# Patient Record
Sex: MALE | Race: WHITE | NOT HISPANIC OR LATINO | Employment: OTHER | RURAL
[De-identification: names, ages, dates, MRNs, and addresses within clinical notes are randomized per-mention and may not be internally consistent; named-entity substitution may affect disease eponyms.]

---

## 2017-05-12 ENCOUNTER — CONVERSION ENCOUNTER (OUTPATIENT)
Dept: FAMILY MEDICINE CLINIC | Facility: CLINIC | Age: 62
End: 2017-05-12

## 2018-04-19 ENCOUNTER — CONVERSION ENCOUNTER (OUTPATIENT)
Dept: FAMILY MEDICINE CLINIC | Facility: CLINIC | Age: 63
End: 2018-04-19

## 2018-05-29 ENCOUNTER — CONVERSION ENCOUNTER (OUTPATIENT)
Dept: FAMILY MEDICINE CLINIC | Facility: CLINIC | Age: 63
End: 2018-05-29

## 2019-06-04 VITALS
HEIGHT: 69 IN | HEART RATE: 71 BPM | DIASTOLIC BLOOD PRESSURE: 80 MMHG | HEART RATE: 62 BPM | WEIGHT: 209.5 LBS | HEART RATE: 66 BPM | DIASTOLIC BLOOD PRESSURE: 86 MMHG | SYSTOLIC BLOOD PRESSURE: 120 MMHG | SYSTOLIC BLOOD PRESSURE: 128 MMHG | RESPIRATION RATE: 16 BRPM | RESPIRATION RATE: 18 BRPM | HEIGHT: 69 IN | WEIGHT: 203.2 LBS | DIASTOLIC BLOOD PRESSURE: 74 MMHG | OXYGEN SATURATION: 96 % | WEIGHT: 207.5 LBS | OXYGEN SATURATION: 98 % | BODY MASS INDEX: 30.1 KG/M2 | BODY MASS INDEX: 31.03 KG/M2 | SYSTOLIC BLOOD PRESSURE: 131 MMHG | RESPIRATION RATE: 16 BRPM | OXYGEN SATURATION: 95 %

## 2019-08-29 ENCOUNTER — TELEPHONE (OUTPATIENT)
Dept: FAMILY MEDICINE CLINIC | Facility: CLINIC | Age: 64
End: 2019-08-29

## 2019-08-29 RX ORDER — BUPROPION HYDROCHLORIDE 75 MG/1
TABLET ORAL
COMMUNITY
Start: 2013-07-12 | End: 2019-09-02 | Stop reason: SDUPTHER

## 2019-08-29 RX ORDER — TRIAMTERENE AND HYDROCHLOROTHIAZIDE 37.5; 25 MG/1; MG/1
1 CAPSULE ORAL DAILY
COMMUNITY
Start: 2013-07-12 | End: 2019-09-02 | Stop reason: SDUPTHER

## 2019-08-29 RX ORDER — SIMVASTATIN 40 MG
TABLET ORAL EVERY 24 HOURS
COMMUNITY
Start: 2013-07-12 | End: 2019-09-01 | Stop reason: SDUPTHER

## 2019-08-29 RX ORDER — ASPIRIN 81 MG/1
TABLET ORAL
COMMUNITY
Start: 2014-08-19 | End: 2019-10-21

## 2019-08-29 RX ORDER — LORATADINE 10 MG/1
TABLET ORAL
COMMUNITY
Start: 2014-08-19 | End: 2022-03-10 | Stop reason: SDUPTHER

## 2019-08-29 RX ORDER — CITALOPRAM 40 MG/1
TABLET ORAL
COMMUNITY
Start: 2013-07-12 | End: 2019-09-01 | Stop reason: SDUPTHER

## 2019-08-29 RX ORDER — LOSARTAN POTASSIUM AND HYDROCHLOROTHIAZIDE 12.5; 5 MG/1; MG/1
TABLET ORAL EVERY 24 HOURS
COMMUNITY
Start: 2018-04-21 | End: 2019-09-01 | Stop reason: SDUPTHER
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
## 2019-09-02 RX ORDER — BUPROPION HYDROCHLORIDE 150 MG/1
TABLET ORAL
Qty: 90 TABLET | Refills: 0 | Status: SHIPPED | OUTPATIENT
Start: 2019-09-02 | End: 2019-10-07 | Stop reason: SDUPTHER

## 2019-09-02 RX ORDER — LOSARTAN POTASSIUM AND HYDROCHLOROTHIAZIDE 12.5; 5 MG/1; MG/1
TABLET ORAL
Qty: 90 TABLET | Refills: 0 | Status: SHIPPED | OUTPATIENT
Start: 2019-09-02 | End: 2019-09-11 | Stop reason: SDUPTHER

## 2019-09-02 RX ORDER — SIMVASTATIN 40 MG
TABLET ORAL
Qty: 90 TABLET | Refills: 0 | Status: SHIPPED | OUTPATIENT
Start: 2019-09-02 | End: 2019-10-07 | Stop reason: SDUPTHER

## 2019-09-02 RX ORDER — CITALOPRAM 40 MG/1
TABLET ORAL
Qty: 90 TABLET | Refills: 0 | Status: SHIPPED | OUTPATIENT
Start: 2019-09-02 | End: 2019-10-07 | Stop reason: SDUPTHER

## 2019-09-10 ENCOUNTER — TELEPHONE (OUTPATIENT)
Dept: FAMILY MEDICINE CLINIC | Facility: CLINIC | Age: 64
End: 2019-09-10

## 2019-09-11 RX ORDER — LOSARTAN POTASSIUM 50 MG/1
50 TABLET ORAL DAILY
Qty: 30 TABLET | Refills: 0 | Status: SHIPPED | OUTPATIENT
Start: 2019-09-11 | End: 2019-10-07 | Stop reason: SDUPTHER

## 2019-09-11 RX ORDER — HYDROCHLOROTHIAZIDE 12.5 MG/1
12.5 TABLET ORAL DAILY
Qty: 30 TABLET | Refills: 0 | Status: SHIPPED | OUTPATIENT
Start: 2019-09-11 | End: 2019-10-07 | Stop reason: SDUPTHER

## 2019-10-07 RX ORDER — HYDROCHLOROTHIAZIDE 12.5 MG/1
12.5 TABLET ORAL DAILY
Qty: 30 TABLET | Refills: 0 | Status: SHIPPED | OUTPATIENT
Start: 2019-10-07 | End: 2019-10-21 | Stop reason: SDUPTHER

## 2019-10-07 RX ORDER — SIMVASTATIN 40 MG
40 TABLET ORAL DAILY
Qty: 30 TABLET | Refills: 0 | Status: SHIPPED | OUTPATIENT
Start: 2019-10-07 | End: 2019-10-21 | Stop reason: SDUPTHER

## 2019-10-07 RX ORDER — LOSARTAN POTASSIUM 50 MG/1
50 TABLET ORAL DAILY
Qty: 30 TABLET | Refills: 0 | Status: SHIPPED | OUTPATIENT
Start: 2019-10-07 | End: 2019-10-21 | Stop reason: SDUPTHER

## 2019-10-07 RX ORDER — BUPROPION HYDROCHLORIDE 150 MG/1
150 TABLET ORAL DAILY
Qty: 30 TABLET | Refills: 0 | Status: SHIPPED | OUTPATIENT
Start: 2019-10-07 | End: 2019-10-21 | Stop reason: SDUPTHER

## 2019-10-07 RX ORDER — CITALOPRAM 40 MG/1
20 TABLET ORAL 2 TIMES DAILY
Qty: 30 TABLET | Refills: 0 | Status: SHIPPED | OUTPATIENT
Start: 2019-10-07 | End: 2019-10-21 | Stop reason: SDUPTHER

## 2019-10-15 PROBLEM — M51.379 DEGENERATION OF LUMBAR OR LUMBOSACRAL INTERVERTEBRAL DISC: Status: ACTIVE | Noted: 2019-10-15

## 2019-10-15 PROBLEM — D64.9 ANEMIA: Status: ACTIVE | Noted: 2018-07-17

## 2019-10-15 PROBLEM — M70.22 OLECRANON BURSITIS OF LEFT ELBOW: Status: ACTIVE | Noted: 2017-05-12

## 2019-10-15 PROBLEM — IMO0002 DEGENERATION OF INTERVERTEBRAL DISC: Status: ACTIVE | Noted: 2019-10-15

## 2019-10-15 PROBLEM — J30.9 ALLERGIC RHINITIS: Status: ACTIVE | Noted: 2019-10-15

## 2019-10-15 PROBLEM — I10 HYPERTENSION, BENIGN: Status: ACTIVE | Noted: 2019-10-15

## 2019-10-15 PROBLEM — M51.37 DEGENERATION OF LUMBAR OR LUMBOSACRAL INTERVERTEBRAL DISC: Status: ACTIVE | Noted: 2019-10-15

## 2019-10-21 ENCOUNTER — OFFICE VISIT (OUTPATIENT)
Dept: FAMILY MEDICINE CLINIC | Facility: CLINIC | Age: 64
End: 2019-10-21

## 2019-10-21 VITALS
HEIGHT: 69 IN | HEART RATE: 58 BPM | WEIGHT: 197.2 LBS | BODY MASS INDEX: 29.21 KG/M2 | OXYGEN SATURATION: 96 % | TEMPERATURE: 98 F | RESPIRATION RATE: 18 BRPM | SYSTOLIC BLOOD PRESSURE: 135 MMHG | DIASTOLIC BLOOD PRESSURE: 84 MMHG

## 2019-10-21 DIAGNOSIS — Z12.5 SCREENING PSA (PROSTATE SPECIFIC ANTIGEN): ICD-10-CM

## 2019-10-21 DIAGNOSIS — E78.01 FAMILIAL HYPERCHOLESTEROLEMIA: ICD-10-CM

## 2019-10-21 DIAGNOSIS — I10 HYPERTENSION, BENIGN: ICD-10-CM

## 2019-10-21 DIAGNOSIS — Z00.00 WELLNESS EXAMINATION: Primary | ICD-10-CM

## 2019-10-21 DIAGNOSIS — J30.1 SEASONAL ALLERGIC RHINITIS DUE TO POLLEN: ICD-10-CM

## 2019-10-21 DIAGNOSIS — M51.37 DEGENERATION OF LUMBAR OR LUMBOSACRAL INTERVERTEBRAL DISC: ICD-10-CM

## 2019-10-21 PROCEDURE — 99396 PREV VISIT EST AGE 40-64: CPT | Performed by: FAMILY MEDICINE

## 2019-10-21 RX ORDER — HYDROCHLOROTHIAZIDE 12.5 MG/1
12.5 TABLET ORAL DAILY
Qty: 90 TABLET | Refills: 3 | Status: SHIPPED | OUTPATIENT
Start: 2019-10-21 | End: 2021-07-01

## 2019-10-21 RX ORDER — CITALOPRAM 40 MG/1
20 TABLET ORAL 2 TIMES DAILY
Qty: 90 TABLET | Refills: 3 | Status: SHIPPED | OUTPATIENT
Start: 2019-10-21 | End: 2021-01-28 | Stop reason: SDUPTHER

## 2019-10-21 RX ORDER — BUPROPION HYDROCHLORIDE 150 MG/1
150 TABLET ORAL DAILY
Qty: 90 TABLET | Refills: 3 | Status: SHIPPED | OUTPATIENT
Start: 2019-10-21 | End: 2021-01-28 | Stop reason: SDUPTHER

## 2019-10-21 RX ORDER — SIMVASTATIN 40 MG
40 TABLET ORAL DAILY
Qty: 90 TABLET | Refills: 3 | Status: SHIPPED | OUTPATIENT
Start: 2019-10-21 | End: 2021-01-28 | Stop reason: SDUPTHER

## 2019-10-21 RX ORDER — LOSARTAN POTASSIUM 50 MG/1
50 TABLET ORAL DAILY
Qty: 90 TABLET | Refills: 3 | Status: SHIPPED | OUTPATIENT
Start: 2019-10-21 | End: 2021-05-21 | Stop reason: SDUPTHER

## 2019-10-21 NOTE — ASSESSMENT & PLAN NOTE
Discussed injury prevention, diet and exercise, safe sexual practices, and screening for common diseases. Encouraged use of sunscreen and seatbelts. Discussed timing of colon cancer cancer screening, prostate cancer screening, and review of skin for lesions. Avoidance of tobacco encouraged. Limitation or avoidance of alcohol encouraged. Recommend yearly dental and eye exams. Also discussed monitoring of blood pressure and lipids.

## 2019-10-21 NOTE — PROGRESS NOTES
Patient is here to discuss health maintenance issues and review options for preventive care.        Past Medical History:   Diagnosis Date   • Adult situational stress disorder    • Allergic rhinitis    • Anemia    • Congenital hiatus hernia    • Diverticulosis    • Dysphagia    • ED (erectile dysfunction)    • Hyperlipidemia    • Hypertension    • Olecranon bursitis      Past Surgical History:   Procedure Laterality Date   • HERNIA REPAIR       Family History   Problem Relation Age of Onset   • Stroke Father      Social History     Tobacco Use   • Smoking status: Former Smoker   • Smokeless tobacco: Never Used   Substance Use Topics   • Alcohol use: No     Frequency: Never     Review of Systems   Constitutional: Negative for activity change, appetite change, chills, diaphoresis, fatigue, fever, unexpected weight gain and unexpected weight loss.   HENT: Negative for congestion, dental problem, drooling, ear discharge, ear pain, facial swelling, hearing loss, mouth sores, nosebleeds, postnasal drip, rhinorrhea, sinus pressure, sneezing, sore throat, tinnitus, trouble swallowing and voice change.    Eyes: Negative for blurred vision, double vision, photophobia, pain, discharge, redness, itching and visual disturbance.   Respiratory: Negative for apnea, cough, choking, chest tightness, shortness of breath, wheezing and stridor.    Cardiovascular: Negative for chest pain, palpitations and leg swelling.   Gastrointestinal: Negative for abdominal distention, abdominal pain, anal bleeding, blood in stool, constipation, diarrhea, nausea, rectal pain, vomiting, GERD and indigestion.   Endocrine: Negative for cold intolerance, heat intolerance, polydipsia, polyphagia and polyuria.   Genitourinary: Negative for breast discharge, decreased libido, decreased urine volume, difficulty urinating, discharge, dysuria, flank pain, frequency, genital sores, hematuria, nocturia, penile pain, erectile dysfunction, penile swelling,  "scrotal swelling, testicular pain, urgency and urinary incontinence.   Musculoskeletal: Negative for arthralgias, back pain, gait problem, joint swelling, myalgias, neck pain, neck stiffness and bursitis.   Skin: Negative for color change and dry skin.   Allergic/Immunologic: Negative for environmental allergies, food allergies and immunocompromised state.   Neurological: Negative for dizziness, tremors, seizures, syncope, facial asymmetry, speech difficulty, weakness, light-headedness, numbness, headache, memory problem and confusion.   Hematological: Negative for adenopathy. Does not bruise/bleed easily.   Psychiatric/Behavioral: Negative for agitation, behavioral problems, decreased concentration, dysphoric mood, hallucinations, self-injury, sleep disturbance, suicidal ideas, negative for hyperactivity, depressed mood and stress. The patient is not nervous/anxious.      Vitals:    10/21/19 0943 10/21/19 1008   BP: 144/96 135/84   BP Location: Left arm    Cuff Size: Adult    Pulse: 58    Resp: 18    Temp: 98 °F (36.7 °C)    TempSrc: Oral    SpO2: 96%    Weight: 89.4 kg (197 lb 3.2 oz)    Height: 175.3 cm (69\")      Body mass index is 29.12 kg/m².  Physical Exam   Constitutional: He is oriented to person, place, and time. He appears well-developed and well-nourished.   HENT:   Head: Normocephalic and atraumatic.   Eyes: Conjunctivae and EOM are normal. Pupils are equal, round, and reactive to light. Right eye exhibits no discharge. Left eye exhibits no discharge. No scleral icterus.   Neck: No thyromegaly present.   Cardiovascular: Normal rate, regular rhythm and normal heart sounds. Exam reveals no gallop and no friction rub.   No murmur heard.  Pulmonary/Chest: Effort normal and breath sounds normal. No respiratory distress. He has no wheezes. He has no rales.   Abdominal: Soft. Bowel sounds are normal. He exhibits no distension and no mass. There is no tenderness. There is no rebound and no guarding. "   Musculoskeletal: Normal range of motion. He exhibits no edema, tenderness or deformity.   Lymphadenopathy:     He has no cervical adenopathy.   Neurological: He is alert and oriented to person, place, and time. He displays normal reflexes. No cranial nerve deficit or sensory deficit. He exhibits normal muscle tone. Coordination normal.   Skin: Skin is warm and dry. No rash noted. No erythema.   Psychiatric: He has a normal mood and affect. His behavior is normal. Judgment and thought content normal.     No visits with results within 7 Day(s) from this visit.   Latest known visit with results is:   Conversion Encounter on 07/16/2016   No results displayed because visit has over 200 results.            Diagnoses and all orders for this visit:    1. Wellness examination (Primary)  Assessment & Plan:  Discussed injury prevention, diet and exercise, safe sexual practices, and screening for common diseases. Encouraged use of sunscreen and seatbelts. Discussed timing of colon cancer cancer screening, prostate cancer screening, and review of skin for lesions. Avoidance of tobacco encouraged. Limitation or avoidance of alcohol encouraged. Recommend yearly dental and eye exams. Also discussed monitoring of blood pressure and lipids.        2. Hypertension, benign  Assessment & Plan:  Stable.  Continue current medications.    Orders:  -     CBC & Differential    3. Familial hypercholesterolemia  Assessment & Plan:  Repeat blood work and call with results.    Orders:  -     Comprehensive Metabolic Panel  -     Lipid Panel    4. Seasonal allergic rhinitis due to pollen  Assessment & Plan:  Seasonal.      5. Degeneration of lumbar or lumbosacral intervertebral disc  Assessment & Plan:  At baseline.      6. Screening PSA (prostate specific antigen)  -     PSA Screen    Other orders  -     simvastatin (ZOCOR) 40 MG tablet; Take 1 tablet by mouth Daily.  Dispense: 90 tablet; Refill: 3  -     losartan (COZAAR) 50 MG tablet; Take 1  tablet by mouth Daily for 30 days.  Dispense: 90 tablet; Refill: 3  -     hydroCHLOROthiazide (HYDRODIURIL) 12.5 MG tablet; Take 1 tablet by mouth Daily for 30 days.  Dispense: 90 tablet; Refill: 3  -     citalopram (CeleXA) 40 MG tablet; Take 0.5 tablets by mouth 2 (Two) Times a Day.  Dispense: 90 tablet; Refill: 3  -     buPROPion XL (WELLBUTRIN XL) 150 MG 24 hr tablet; Take 1 tablet by mouth Daily.  Dispense: 90 tablet; Refill: 3

## 2020-02-03 ENCOUNTER — HOSPITAL ENCOUNTER (OUTPATIENT)
Dept: GENERAL RADIOLOGY | Facility: HOSPITAL | Age: 65
Discharge: HOME | End: 2020-02-03
Admitting: NURSE PRACTITIONER

## 2020-02-03 ENCOUNTER — OFFICE VISIT (OUTPATIENT)
Dept: FAMILY MEDICINE CLINIC | Facility: CLINIC | Age: 65
End: 2020-02-03

## 2020-02-03 VITALS
OXYGEN SATURATION: 95 % | WEIGHT: 197 LBS | BODY MASS INDEX: 29.18 KG/M2 | HEIGHT: 69 IN | RESPIRATION RATE: 16 BRPM | HEART RATE: 66 BPM | TEMPERATURE: 98.1 F | SYSTOLIC BLOOD PRESSURE: 154 MMHG | DIASTOLIC BLOOD PRESSURE: 88 MMHG

## 2020-02-03 DIAGNOSIS — R13.19 OTHER DYSPHAGIA: ICD-10-CM

## 2020-02-03 DIAGNOSIS — R07.9 CHEST PAIN, UNSPECIFIED TYPE: Primary | ICD-10-CM

## 2020-02-03 DIAGNOSIS — R07.9 CHEST PAIN, UNSPECIFIED TYPE: ICD-10-CM

## 2020-02-03 DIAGNOSIS — K44.9 LARGE HIATAL HERNIA: ICD-10-CM

## 2020-02-03 PROCEDURE — 71046 X-RAY EXAM CHEST 2 VIEWS: CPT

## 2020-02-03 PROCEDURE — 99213 OFFICE O/P EST LOW 20 MIN: CPT | Performed by: NURSE PRACTITIONER

## 2020-02-03 NOTE — PROGRESS NOTES
Chief Complaint   Patient presents with   • Skin Lesion        Subjective   Daniel Cagle is a 64 y.o. male who presents today for xiphoid process    HPI: Patient is here after having some soreness and tenderness at the distal end of his sternum starting around Maxx.  He has palpated an area he believes is his xiphoid process but he feels like it is larger than it was in the past.  He like to get that checked today he has some anxiety related to it he also reports that sometimes he has some esophageal and right chest tightness.  He insists he is no chest pain or shortness of breath because he has been through that before.  He was concerned about the xiphoid process.  Approximately 10 years ago he had a EGD and dilatation.  He is taking Prilosec on a regular basis.  He reports that he has no shortness of breath,cough or other symptoms.  He has a great deal of situational anxiety at times.  And this is worrying him that he might have something more wrong with him.  Daniel Cagle  has a past medical history of Adult situational stress disorder, Allergic rhinitis, Anemia, Congenital hiatus hernia, Diverticulosis, Dysphagia, ED (erectile dysfunction), Hyperlipidemia, Hypertension, and Olecranon bursitis.    Allergies   Allergen Reactions   • Cyclobenzaprine Unknown - High Severity     Jerking movements.        Current Outpatient Medications:   •  buPROPion XL (WELLBUTRIN XL) 150 MG 24 hr tablet, Take 1 tablet by mouth Daily., Disp: 90 tablet, Rfl: 3  •  citalopram (CeleXA) 40 MG tablet, Take 0.5 tablets by mouth 2 (Two) Times a Day., Disp: 90 tablet, Rfl: 3  •  loratadine (ALAVERT) 10 MG tablet, ALAVERT 10 MG ORAL TABLET, Disp: , Rfl:   •  simvastatin (ZOCOR) 40 MG tablet, Take 1 tablet by mouth Daily., Disp: 90 tablet, Rfl: 3  Past Medical History:   Diagnosis Date   • Adult situational stress disorder    • Allergic rhinitis    • Anemia    • Congenital hiatus hernia    • Diverticulosis    • Dysphagia    • ED  (erectile dysfunction)    • Hyperlipidemia    • Hypertension    • Olecranon bursitis      Past Surgical History:   Procedure Laterality Date   • HERNIA REPAIR       Social History     Socioeconomic History   • Marital status:      Spouse name: Not on file   • Number of children: Not on file   • Years of education: Not on file   • Highest education level: Not on file   Tobacco Use   • Smoking status: Former Smoker   • Smokeless tobacco: Never Used   Substance and Sexual Activity   • Alcohol use: No     Frequency: Never   • Drug use: No     Family History   Problem Relation Age of Onset   • Stroke Father        Family history, surgical history, past medical history, Allergies and med's reviewed with patient today and updated in [employer] EMR.   PHQ-2 Depression Screening  Little interest or pleasure in doing things? 0   Feeling down, depressed, or hopeless? 0   PHQ-2 Total Score 0   PHQ-9 Depression Screening  Little interest or pleasure in doing things? 0   Feeling down, depressed, or hopeless? 0   Trouble falling or staying asleep, or sleeping too much?     Feeling tired or having little energy?     Poor appetite or overeating?     Feeling bad about yourself - or that you are a failure or have let yourself or your family down?     Trouble concentrating on things, such as reading the newspaper or watching television?     Moving or speaking so slowly that other people could have noticed? Or the opposite - being so fidgety or restless that you have been moving around a lot more than usual?     Thoughts that you would be better off dead, or of hurting yourself in some way?     PHQ-9 Total Score 0   If you checked off any problems, how difficult have these problems made it for you to do your work, take care of things at home, or get along with other people?       ROS:  Review of Systems   Constitutional: Negative for fatigue and fever.   Respiratory: Negative for cough and shortness of breath.    Cardiovascular:  "Positive for chest pain.        Chest wall pain   Gastrointestinal: Negative for abdominal pain, constipation and diarrhea.       OBJECTIVE:  Vitals:    02/03/20 1527   BP: 154/88   BP Location: Left arm   Patient Position: Sitting   Cuff Size: Adult   Pulse: 66   Resp: 16   Temp: 98.1 °F (36.7 °C)   TempSrc: Oral   SpO2: 95%   Weight: 89.4 kg (197 lb)   Height: 175.3 cm (69\")     Physical Exam   Constitutional: He appears well-developed and well-nourished.   Cardiovascular: Normal rate, regular rhythm and normal heart sounds. Exam reveals no gallop and no friction rub.   No murmur heard.  Pulmonary/Chest: Effort normal and breath sounds normal. He has no wheezes. He has no rales.   Abdominal: Soft. Bowel sounds are normal. There is no tenderness.   Musculoskeletal:   Chest wall with some mild tenderness in the xiphoid process area no deformity or abnormal finding   Neurological: He is alert.   Skin: Skin is warm and dry.   Nursing note and vitals reviewed.      ASSESSMENT/ PLAN:    Daniel was seen today for skin lesion.    Diagnoses and all orders for this visit:    Chest pain, unspecified type  Comments:  Will call with x-rays discussed possible other work-up including EGD if symptoms persist.  Reassured  Orders:  -     XR Chest PA & Lateral; Future        Orders Placed Today:     No orders of the defined types were placed in this encounter.       Management Plan:     An After Visit Summary was printed and given to the patient at discharge.    Follow-up: No follow-ups on file.    THU Caruso 2/3/2020 5:26 PM  This note was electronically signed.    "

## 2020-12-07 ENCOUNTER — CLINICAL SUPPORT (OUTPATIENT)
Dept: FAMILY MEDICINE CLINIC | Facility: CLINIC | Age: 65
End: 2020-12-07

## 2020-12-07 DIAGNOSIS — Z23 NEED FOR VACCINATION: Primary | ICD-10-CM

## 2020-12-07 PROCEDURE — 90715 TDAP VACCINE 7 YRS/> IM: CPT | Performed by: FAMILY MEDICINE

## 2020-12-07 PROCEDURE — 90471 IMMUNIZATION ADMIN: CPT | Performed by: FAMILY MEDICINE

## 2021-01-18 RX ORDER — CITALOPRAM 40 MG/1
TABLET ORAL
Qty: 90 TABLET | Refills: 0 | OUTPATIENT
Start: 2021-01-18

## 2021-01-18 RX ORDER — BUPROPION HYDROCHLORIDE 150 MG/1
TABLET ORAL
Qty: 90 TABLET | Refills: 0 | OUTPATIENT
Start: 2021-01-18

## 2021-01-18 RX ORDER — SIMVASTATIN 40 MG
TABLET ORAL
Qty: 90 TABLET | Refills: 0 | OUTPATIENT
Start: 2021-01-18

## 2021-01-18 RX ORDER — HYDROCHLOROTHIAZIDE 12.5 MG/1
TABLET ORAL
Qty: 90 TABLET | Refills: 0 | OUTPATIENT
Start: 2021-01-18

## 2021-01-18 RX ORDER — LOSARTAN POTASSIUM 50 MG/1
TABLET ORAL
Qty: 90 TABLET | Refills: 0 | OUTPATIENT
Start: 2021-01-18

## 2021-01-22 RX ORDER — BUPROPION HYDROCHLORIDE 150 MG/1
TABLET ORAL
Qty: 90 TABLET | Refills: 0 | OUTPATIENT
Start: 2021-01-22

## 2021-01-22 RX ORDER — SIMVASTATIN 40 MG
TABLET ORAL
Qty: 90 TABLET | Refills: 0 | OUTPATIENT
Start: 2021-01-22

## 2021-01-22 RX ORDER — LOSARTAN POTASSIUM 50 MG/1
TABLET ORAL
Qty: 90 TABLET | Refills: 0 | OUTPATIENT
Start: 2021-01-22

## 2021-01-22 RX ORDER — CITALOPRAM 40 MG/1
TABLET ORAL
Qty: 90 TABLET | Refills: 0 | OUTPATIENT
Start: 2021-01-22

## 2021-01-22 RX ORDER — HYDROCHLOROTHIAZIDE 12.5 MG/1
TABLET ORAL
Qty: 90 TABLET | Refills: 0 | OUTPATIENT
Start: 2021-01-22

## 2021-01-28 RX ORDER — CITALOPRAM 40 MG/1
40 TABLET ORAL 2 TIMES DAILY
Qty: 60 TABLET | Refills: 1 | Status: SHIPPED | OUTPATIENT
Start: 2021-01-28 | End: 2021-05-21

## 2021-01-28 RX ORDER — HYDROCHLOROTHIAZIDE 12.5 MG/1
12.5 TABLET ORAL DAILY
Qty: 30 TABLET | Refills: 1 | Status: SHIPPED | OUTPATIENT
Start: 2021-01-28 | End: 2021-04-12

## 2021-01-28 RX ORDER — SIMVASTATIN 40 MG
40 TABLET ORAL DAILY
Qty: 30 TABLET | Refills: 1 | Status: SHIPPED | OUTPATIENT
Start: 2021-01-28 | End: 2021-04-12

## 2021-01-28 RX ORDER — BUPROPION HYDROCHLORIDE 150 MG/1
150 TABLET ORAL DAILY
Qty: 30 TABLET | Refills: 1 | Status: SHIPPED | OUTPATIENT
Start: 2021-01-28 | End: 2021-04-12

## 2021-01-28 RX ORDER — LOSARTAN POTASSIUM 50 MG/1
50 TABLET ORAL DAILY
Qty: 30 TABLET | Refills: 1 | Status: SHIPPED | OUTPATIENT
Start: 2021-01-28 | End: 2021-04-12

## 2021-01-28 NOTE — TELEPHONE ENCOUNTER
Patient has an appointment on 03/02/2021. Cammie was going to refill these for him last Friday because he called very upset. But they were never sent. He is out.   Can you please refill until his appointment?

## 2021-03-04 ENCOUNTER — OFFICE VISIT (OUTPATIENT)
Dept: FAMILY MEDICINE CLINIC | Facility: CLINIC | Age: 66
End: 2021-03-04

## 2021-03-04 VITALS
WEIGHT: 202 LBS | SYSTOLIC BLOOD PRESSURE: 135 MMHG | DIASTOLIC BLOOD PRESSURE: 84 MMHG | TEMPERATURE: 97.4 F | RESPIRATION RATE: 16 BRPM | HEIGHT: 69 IN | BODY MASS INDEX: 29.92 KG/M2 | HEART RATE: 58 BPM | OXYGEN SATURATION: 97 %

## 2021-03-04 DIAGNOSIS — D50.8 IRON DEFICIENCY ANEMIA SECONDARY TO INADEQUATE DIETARY IRON INTAKE: ICD-10-CM

## 2021-03-04 DIAGNOSIS — Z12.5 SCREENING PSA (PROSTATE SPECIFIC ANTIGEN): ICD-10-CM

## 2021-03-04 DIAGNOSIS — F43.22 ADJUSTMENT DISORDER WITH ANXIOUS MOOD: ICD-10-CM

## 2021-03-04 DIAGNOSIS — Z00.00 WELCOME TO MEDICARE PREVENTIVE VISIT: ICD-10-CM

## 2021-03-04 DIAGNOSIS — I10 HYPERTENSION, BENIGN: ICD-10-CM

## 2021-03-04 DIAGNOSIS — M51.37 DEGENERATION OF LUMBAR OR LUMBOSACRAL INTERVERTEBRAL DISC: ICD-10-CM

## 2021-03-04 DIAGNOSIS — Z00.00 MEDICARE ANNUAL WELLNESS VISIT, SUBSEQUENT: Primary | ICD-10-CM

## 2021-03-04 DIAGNOSIS — E78.2 MIXED HYPERLIPIDEMIA: ICD-10-CM

## 2021-03-04 DIAGNOSIS — K57.30 DIVERTICULOSIS OF COLON: ICD-10-CM

## 2021-03-04 DIAGNOSIS — J30.1 SEASONAL ALLERGIC RHINITIS DUE TO POLLEN: ICD-10-CM

## 2021-03-04 PROBLEM — IMO0002 DEGENERATION OF INTERVERTEBRAL DISC: Status: RESOLVED | Noted: 2019-10-15 | Resolved: 2021-03-04

## 2021-03-04 PROCEDURE — G0403 EKG FOR INITIAL PREVENT EXAM: HCPCS | Performed by: FAMILY MEDICINE

## 2021-03-04 PROCEDURE — G0402 INITIAL PREVENTIVE EXAM: HCPCS | Performed by: FAMILY MEDICINE

## 2021-03-04 NOTE — PROGRESS NOTES
Chief Complaint   Patient presents with   • Welcome To Medicare   • Hypertension   • Hyperlipidemia       Subjective   History of Present Illness:  Daniel Cagle is a 65 y.o. male who presents for a  Welcome to Medicare Visit.    HEALTH RISK ASSESSMENT    Recent Hospitalizations:  No hospitalization(s) within the last year.    Current Medical Providers:  Patient Care Team:  Lyell, Reggie Duane, MD as PCP - General    Smoking Status:  Social History     Tobacco Use   Smoking Status Former Smoker   • Packs/day: 1.00   • Years: 5.00   • Pack years: 5.00   • Types: Cigarettes   • Start date:    • Quit date:    • Years since quittin.2   Smokeless Tobacco Never Used       Alcohol Consumption:  Social History     Substance and Sexual Activity   Alcohol Use No   • Frequency: Never       Depression Screen:   PHQ-2/PHQ-9 Depression Screening 3/4/2021   Little interest or pleasure in doing things 0   Feeling down, depressed, or hopeless 0   Total Score 0       Fall Risk Screen:  RACHELLE Fall Risk Assessment was completed, and patient is at LOW risk for falls.Assessment completed on:3/4/2021    Health Habits and Functional and Cognitive Screening:  Functional & Cognitive Status 3/4/2021   Do you have difficulty preparing food and eating? No   Do you have difficulty bathing yourself, getting dressed or grooming yourself? No   Do you have difficulty using the toilet? No   Do you have difficulty moving around from place to place? No   Do you have trouble with steps or getting out of a bed or a chair? No   Current Diet Well Balanced Diet   Dental Exam Not up to date   Eye Exam Not up to date   Exercise (times per week) 0 times per week   Current Exercise Activities Include None   Do you need help using the phone?  No   Are you deaf or do you have serious difficulty hearing?  No   Do you need help with transportation? No   Do you need help shopping? No   Do you need help preparing meals?  No   Do you need help with  housework?  No   Do you need help with laundry? No   Do you need help taking your medications? No   Do you need help managing money? No   Do you ever drive or ride in a car without wearing a seat belt? No   Have you felt unusual stress, anger or loneliness in the last month? No   Who do you live with? Alone   If you need help, do you have trouble finding someone available to you? No   Have you been bothered in the last four weeks by sexual problems? No   Do you have difficulty concentrating, remembering or making decisions? No         Does the patient have evidence of cognitive impairment? No    Asprin use counseling:Does not need ASA (and currently is not on it)    Visual Acuity:     Visual Acuity Screening    Right eye Left eye Both eyes   Without correction:      With correction: 20/20 20/25 20/20       Age-appropriate Screening Schedule:  Refer to the list below for future screening recommendations based on patient's age, sex and/or medical conditions. Orders for these recommended tests are listed in the plan section. The patient has been provided with a written plan.    Health Maintenance   Topic Date Due   • ZOSTER VACCINE (1 of 2) 05/12/2005   • COLONOSCOPY  08/13/2018   • LIPID PANEL  10/15/2019   • INFLUENZA VACCINE  08/01/2020   • TDAP/TD VACCINES (2 - Td) 12/07/2030          The following portions of the patient's history were reviewed and updated as appropriate: allergies, current medications, past family history, past medical history, past social history, past surgical history and problem list.    Outpatient Medications Prior to Visit   Medication Sig Dispense Refill   • buPROPion XL (WELLBUTRIN XL) 150 MG 24 hr tablet Take 1 tablet by mouth Daily. 30 tablet 1   • citalopram (CeleXA) 40 MG tablet Take 1 tablet by mouth 2 (Two) Times a Day. 60 tablet 1   • hydroCHLOROthiazide (HYDRODIURIL) 12.5 MG tablet Take 1 tablet by mouth Daily for 30 days. 90 tablet 3   • loratadine (ALAVERT) 10 MG tablet ALAVERT  10 MG ORAL TABLET     • losartan (COZAAR) 50 MG tablet Take 1 tablet by mouth Daily for 30 days. 90 tablet 3   • simvastatin (ZOCOR) 40 MG tablet Take 1 tablet by mouth Daily. 30 tablet 1   • hydroCHLOROthiazide (HYDRODIURIL) 12.5 MG tablet Take 1 tablet by mouth Daily. 30 tablet 1   • losartan (Cozaar) 50 MG tablet Take 1 tablet by mouth Daily. 30 tablet 1     No facility-administered medications prior to visit.        Patient Active Problem List   Diagnosis   • Adjustment disorder   • Allergic rhinitis   • Anemia   • Benign neoplasm of rectum and anal canal   • Congenital hiatus hernia   • Degeneration of lumbar or lumbosacral intervertebral disc   • Diverticulosis of colon   • Dysphagia   • Epididymal cyst   • Erectile dysfunction   • Hyperlipidemia   • Hypertension, benign   • Olecranon bursitis of left elbow   • Welcome to Medicare preventive visit       Advanced Care Planning:  ACP discussion was held with the patient during this visit. Patient does not have an advance directive, information provided.    Review of Systems   Constitutional: Negative for activity change, appetite change, chills, diaphoresis, fatigue, fever and unexpected weight change.   HENT: Negative for congestion, ear pain, facial swelling, hearing loss, nosebleeds, postnasal drip, rhinorrhea, sinus pressure, sinus pain, sneezing, sore throat, tinnitus and trouble swallowing.    Eyes: Negative for photophobia, pain, discharge, redness, itching and visual disturbance.   Respiratory: Negative for apnea, cough, shortness of breath and wheezing.    Cardiovascular: Negative for chest pain, palpitations and leg swelling.   Gastrointestinal: Negative for abdominal distention, abdominal pain, blood in stool, constipation, diarrhea, nausea, rectal pain and vomiting.   Endocrine: Negative for cold intolerance, heat intolerance, polydipsia, polyphagia and polyuria.   Genitourinary: Negative for decreased urine volume, difficulty urinating, dysuria,  "enuresis, flank pain, frequency, genital sores, hematuria, penile pain, penile swelling, scrotal swelling, testicular pain and urgency.   Musculoskeletal: Negative for arthralgias, back pain, myalgias and neck pain.   Skin: Negative for rash.   Allergic/Immunologic: Negative for environmental allergies and food allergies.   Neurological: Negative for dizziness, tremors, seizures, syncope, facial asymmetry, speech difficulty, weakness, light-headedness, numbness and headaches.   Hematological: Negative for adenopathy. Does not bruise/bleed easily.   Psychiatric/Behavioral: Negative for agitation, confusion, dysphoric mood, sleep disturbance and suicidal ideas. The patient is not nervous/anxious and is not hyperactive.        Compared to one year ago, the patient feels his physical health is the same.  Compared to one year ago, the patient feels his mental health is the same.    Reviewed chart for potential of high risk medication in the elderly: yes  Reviewed chart for potential of harmful drug interactions in the elderly:yes    Objective         Vitals:    03/04/21 0910 03/04/21 0937   BP: 151/91 135/84   BP Location: Left arm    Patient Position: Sitting    Cuff Size: Large Adult    Pulse: 58    Resp: 16    Temp: 97.4 °F (36.3 °C)    TempSrc: Temporal    SpO2: 97%    Weight: 91.6 kg (202 lb)    Height: 175.3 cm (69\")        Body mass index is 29.83 kg/m².  Discussed the patient's BMI with him. The BMI is above average; no BMI management plan is appropriate..    Physical Exam  Constitutional:       Appearance: He is well-developed.   HENT:      Head: Normocephalic and atraumatic.   Eyes:      General: No scleral icterus.        Right eye: No discharge.         Left eye: No discharge.      Conjunctiva/sclera: Conjunctivae normal.      Pupils: Pupils are equal, round, and reactive to light.   Neck:      Thyroid: No thyromegaly.   Cardiovascular:      Rate and Rhythm: Normal rate and regular rhythm.      Heart sounds: " Normal heart sounds. No murmur. No friction rub. No gallop.    Pulmonary:      Effort: Pulmonary effort is normal. No respiratory distress.      Breath sounds: Normal breath sounds. No wheezing or rales.   Abdominal:      General: Bowel sounds are normal. There is no distension.      Palpations: Abdomen is soft. There is no mass.      Tenderness: There is no abdominal tenderness. There is no guarding or rebound.   Musculoskeletal: Normal range of motion.         General: No tenderness or deformity.   Lymphadenopathy:      Cervical: No cervical adenopathy.   Skin:     General: Skin is warm and dry.      Findings: No erythema or rash.   Neurological:      Mental Status: He is alert and oriented to person, place, and time.      Cranial Nerves: No cranial nerve deficit.      Sensory: No sensory deficit.      Motor: No abnormal muscle tone.      Coordination: Coordination normal.      Deep Tendon Reflexes: Reflexes normal.   Psychiatric:         Behavior: Behavior normal.         Thought Content: Thought content normal.         Judgment: Judgment normal.               Procedures    Assessment/Plan   Medicare Risks and Personalized Health Plan  CMS Preventative Services Quick Reference  Advance Directive Discussion  Obesity/Overweight     The above risks/problems have been discussed with the patient.  Pertinent information has been shared with the patient in the After Visit Summary.  Follow up plans and orders are seen below in the Assessment/Plan Section.    Diagnoses and all orders for this visit:    1. Medicare annual wellness visit, subsequent (Primary)  Assessment & Plan:  Updated annual wellness visit checklist.  Immunizations up-to-date.  Screening up-to-date.  Recommend yearly dental and eye exams. Also discussed monitoring of blood pressure and lipids.      2. Seasonal allergic rhinitis due to pollen  Assessment & Plan:  Seasonal. Controlled with over-the-counter medications.       3. Mixed  hyperlipidemia  Assessment & Plan:  Recheck blood work and call with results.  Continue statin therapy.    Orders:  -     Comprehensive Metabolic Panel  -     Lipid Panel    4. Hypertension, benign  Assessment & Plan:  Hypertension is improving with treatment.  Weight loss.  Regular aerobic exercise.  Continue current medications.  Blood pressure will be reassessed at the next regular appointment.    Orders:  -     CBC & Differential    5. Diverticulosis of colon  Assessment & Plan:  Aware.  We will get results of last colonoscopy.      6. Iron deficiency anemia secondary to inadequate dietary iron intake  Assessment & Plan:  Recheck blood work and call with results.      7. Adjustment disorder with anxious mood  Assessment & Plan:  Stable on current medications.      8. Degeneration of lumbar or lumbosacral intervertebral disc  Assessment & Plan:  Minimal symptoms.      9. Screening PSA (prostate specific antigen)  -     PSA Screen      Follow Up:  Return in about 1 year (around 3/4/2022) for Medicare Wellness (Initial).     An After Visit Summary and PPPS were given to the patient.

## 2021-04-12 RX ORDER — SIMVASTATIN 40 MG
TABLET ORAL
Qty: 30 TABLET | Refills: 0 | Status: SHIPPED | OUTPATIENT
Start: 2021-04-12 | End: 2021-05-21

## 2021-04-12 RX ORDER — HYDROCHLOROTHIAZIDE 12.5 MG/1
TABLET ORAL
Qty: 30 TABLET | Refills: 0 | Status: SHIPPED | OUTPATIENT
Start: 2021-04-12 | End: 2021-07-01

## 2021-04-12 RX ORDER — LOSARTAN POTASSIUM 50 MG/1
TABLET ORAL
Qty: 30 TABLET | Refills: 0 | Status: SHIPPED | OUTPATIENT
Start: 2021-04-12 | End: 2021-05-21

## 2021-04-12 RX ORDER — BUPROPION HYDROCHLORIDE 150 MG/1
TABLET ORAL
Qty: 30 TABLET | Refills: 0 | Status: SHIPPED | OUTPATIENT
Start: 2021-04-12 | End: 2021-05-21

## 2021-04-27 LAB
ALBUMIN SERPL-MCNC: 4.6 G/DL (ref 3.8–4.8)
ALBUMIN/GLOB SERPL: 2.1 {RATIO} (ref 1.2–2.2)
ALP SERPL-CCNC: 127 IU/L (ref 39–117)
ALT SERPL-CCNC: 17 IU/L (ref 0–44)
AST SERPL-CCNC: 22 IU/L (ref 0–40)
BASOPHILS # BLD AUTO: 0.1 X10E3/UL (ref 0–0.2)
BASOPHILS NFR BLD AUTO: 2 %
BILIRUB SERPL-MCNC: 0.2 MG/DL (ref 0–1.2)
BUN SERPL-MCNC: 15 MG/DL (ref 8–27)
BUN/CREAT SERPL: 19 (ref 10–24)
CALCIUM SERPL-MCNC: 9.5 MG/DL (ref 8.6–10.2)
CHLORIDE SERPL-SCNC: 96 MMOL/L (ref 96–106)
CHOLEST SERPL-MCNC: 172 MG/DL (ref 100–199)
CO2 SERPL-SCNC: 29 MMOL/L (ref 20–29)
CREAT SERPL-MCNC: 0.81 MG/DL (ref 0.76–1.27)
EOSINOPHIL # BLD AUTO: 0.3 X10E3/UL (ref 0–0.4)
EOSINOPHIL NFR BLD AUTO: 5 %
ERYTHROCYTE [DISTWIDTH] IN BLOOD BY AUTOMATED COUNT: 12.2 % (ref 11.6–15.4)
GLOBULIN SER CALC-MCNC: 2.2 G/DL (ref 1.5–4.5)
GLUCOSE SERPL-MCNC: 105 MG/DL (ref 65–99)
HCT VFR BLD AUTO: 44.9 % (ref 37.5–51)
HDLC SERPL-MCNC: 73 MG/DL
HGB BLD-MCNC: 14.7 G/DL (ref 13–17.7)
IMM GRANULOCYTES # BLD AUTO: 0 X10E3/UL (ref 0–0.1)
IMM GRANULOCYTES NFR BLD AUTO: 1 %
LDLC SERPL CALC-MCNC: 84 MG/DL (ref 0–99)
LYMPHOCYTES # BLD AUTO: 0.4 X10E3/UL (ref 0.7–3.1)
LYMPHOCYTES NFR BLD AUTO: 8 %
MCH RBC QN AUTO: 31.2 PG (ref 26.6–33)
MCHC RBC AUTO-ENTMCNC: 32.7 G/DL (ref 31.5–35.7)
MCV RBC AUTO: 95 FL (ref 79–97)
MONOCYTES # BLD AUTO: 0.7 X10E3/UL (ref 0.1–0.9)
MONOCYTES NFR BLD AUTO: 14 %
NEUTROPHILS # BLD AUTO: 3.5 X10E3/UL (ref 1.4–7)
NEUTROPHILS NFR BLD AUTO: 70 %
PLATELET # BLD AUTO: 403 X10E3/UL (ref 150–450)
POTASSIUM SERPL-SCNC: 4.5 MMOL/L (ref 3.5–5.2)
PROT SERPL-MCNC: 6.8 G/DL (ref 6–8.5)
PSA SERPL-MCNC: 2.8 NG/ML (ref 0–4)
RBC # BLD AUTO: 4.71 X10E6/UL (ref 4.14–5.8)
SODIUM SERPL-SCNC: 137 MMOL/L (ref 134–144)
TRIGL SERPL-MCNC: 83 MG/DL (ref 0–149)
VLDLC SERPL CALC-MCNC: 15 MG/DL (ref 5–40)
WBC # BLD AUTO: 5 X10E3/UL (ref 3.4–10.8)

## 2021-05-21 RX ORDER — BUPROPION HYDROCHLORIDE 150 MG/1
TABLET ORAL
Qty: 30 TABLET | Refills: 0 | Status: SHIPPED | OUTPATIENT
Start: 2021-05-21 | End: 2021-07-01

## 2021-05-21 RX ORDER — SIMVASTATIN 40 MG
TABLET ORAL
Qty: 30 TABLET | Refills: 0 | Status: SHIPPED | OUTPATIENT
Start: 2021-05-21 | End: 2021-07-01

## 2021-05-21 RX ORDER — CITALOPRAM 40 MG/1
TABLET ORAL
Qty: 60 TABLET | Refills: 0 | Status: SHIPPED | OUTPATIENT
Start: 2021-05-21 | End: 2021-08-06

## 2021-05-21 RX ORDER — LOSARTAN POTASSIUM 50 MG/1
TABLET ORAL
Qty: 30 TABLET | Refills: 0 | Status: SHIPPED | OUTPATIENT
Start: 2021-05-21 | End: 2021-07-01

## 2021-06-07 ENCOUNTER — TELEPHONE (OUTPATIENT)
Dept: FAMILY MEDICINE CLINIC | Facility: CLINIC | Age: 66
End: 2021-06-07

## 2021-06-07 NOTE — TELEPHONE ENCOUNTER
----- Message from Jannie Marrufo sent at 6/4/2021  9:46 AM EDT -----  Regarding: FW:  Records  Call [facility] and request patient's last colonoscopy report along with any pathology report from colonoscopy.  ----- Message -----  From: Lyell, Reggie Duane, MD  Sent: 3/5/2021   7:28 AM EDT  To: Jannie Marrufo  Subject: RE:  Records                                   I think it was this one  ----- Message -----  From: Jannie Marrufo  Sent: 3/4/2021   5:39 PM EST  To: Reggie Duane Lyell, MD  Subject: RE:  Records                                   For everybody or a specific patient?  ----- Message -----  From: Lyell, Reggie Duane, MD  Sent: 3/4/2021   9:46 AM EST  To: Jannie Marrufo  Subject:  Records                                       Please get colonoscopy documentation from [facility].

## 2021-07-01 RX ORDER — HYDROCHLOROTHIAZIDE 12.5 MG/1
TABLET ORAL
Qty: 30 TABLET | Refills: 0 | Status: SHIPPED | OUTPATIENT
Start: 2021-07-01 | End: 2021-08-06

## 2021-07-01 RX ORDER — SIMVASTATIN 40 MG
TABLET ORAL
Qty: 30 TABLET | Refills: 0 | Status: SHIPPED | OUTPATIENT
Start: 2021-07-01 | End: 2021-08-06

## 2021-07-01 RX ORDER — BUPROPION HYDROCHLORIDE 150 MG/1
TABLET ORAL
Qty: 30 TABLET | Refills: 0 | Status: SHIPPED | OUTPATIENT
Start: 2021-07-01 | End: 2021-08-06

## 2021-07-01 RX ORDER — LOSARTAN POTASSIUM 50 MG/1
TABLET ORAL
Qty: 30 TABLET | Refills: 0 | Status: SHIPPED | OUTPATIENT
Start: 2021-07-01 | End: 2021-08-06

## 2021-08-06 RX ORDER — BUPROPION HYDROCHLORIDE 150 MG/1
TABLET ORAL
Qty: 30 TABLET | Refills: 0 | Status: SHIPPED | OUTPATIENT
Start: 2021-08-06 | End: 2021-09-13

## 2021-08-06 RX ORDER — LOSARTAN POTASSIUM 50 MG/1
TABLET ORAL
Qty: 30 TABLET | Refills: 0 | Status: SHIPPED | OUTPATIENT
Start: 2021-08-06 | End: 2021-09-13

## 2021-08-06 RX ORDER — CITALOPRAM 40 MG/1
TABLET ORAL
Qty: 60 TABLET | Refills: 0 | Status: SHIPPED | OUTPATIENT
Start: 2021-08-06 | End: 2021-10-21

## 2021-08-06 RX ORDER — SIMVASTATIN 40 MG
TABLET ORAL
Qty: 30 TABLET | Refills: 0 | Status: SHIPPED | OUTPATIENT
Start: 2021-08-06 | End: 2021-09-13

## 2021-08-06 RX ORDER — HYDROCHLOROTHIAZIDE 12.5 MG/1
TABLET ORAL
Qty: 30 TABLET | Refills: 0 | Status: SHIPPED | OUTPATIENT
Start: 2021-08-06 | End: 2021-09-13

## 2021-09-13 RX ORDER — BUPROPION HYDROCHLORIDE 150 MG/1
TABLET ORAL
Qty: 30 TABLET | Refills: 0 | Status: SHIPPED | OUTPATIENT
Start: 2021-09-13 | End: 2021-10-21

## 2021-09-13 RX ORDER — SIMVASTATIN 40 MG
TABLET ORAL
Qty: 30 TABLET | Refills: 0 | Status: SHIPPED | OUTPATIENT
Start: 2021-09-13 | End: 2021-10-21

## 2021-09-13 RX ORDER — HYDROCHLOROTHIAZIDE 12.5 MG/1
TABLET ORAL
Qty: 30 TABLET | Refills: 0 | Status: SHIPPED | OUTPATIENT
Start: 2021-09-13 | End: 2021-10-21

## 2021-09-13 RX ORDER — LOSARTAN POTASSIUM 50 MG/1
TABLET ORAL
Qty: 30 TABLET | Refills: 0 | Status: SHIPPED | OUTPATIENT
Start: 2021-09-13 | End: 2021-10-21

## 2021-10-21 RX ORDER — SIMVASTATIN 40 MG
TABLET ORAL
Qty: 30 TABLET | Refills: 0 | Status: SHIPPED | OUTPATIENT
Start: 2021-10-21 | End: 2021-12-02

## 2021-10-21 RX ORDER — BUPROPION HYDROCHLORIDE 150 MG/1
TABLET ORAL
Qty: 30 TABLET | Refills: 0 | Status: SHIPPED | OUTPATIENT
Start: 2021-10-21 | End: 2021-12-02

## 2021-10-21 RX ORDER — LOSARTAN POTASSIUM 50 MG/1
TABLET ORAL
Qty: 30 TABLET | Refills: 0 | Status: SHIPPED | OUTPATIENT
Start: 2021-10-21 | End: 2021-12-02

## 2021-10-21 RX ORDER — HYDROCHLOROTHIAZIDE 12.5 MG/1
TABLET ORAL
Qty: 30 TABLET | Refills: 0 | Status: SHIPPED | OUTPATIENT
Start: 2021-10-21 | End: 2021-12-02

## 2021-10-21 RX ORDER — CITALOPRAM 40 MG/1
TABLET ORAL
Qty: 60 TABLET | Refills: 0 | Status: SHIPPED | OUTPATIENT
Start: 2021-10-21 | End: 2022-01-09

## 2021-12-02 RX ORDER — BUPROPION HYDROCHLORIDE 150 MG/1
TABLET ORAL
Qty: 30 TABLET | Refills: 0 | Status: SHIPPED | OUTPATIENT
Start: 2021-12-02 | End: 2022-01-09

## 2021-12-02 RX ORDER — SIMVASTATIN 40 MG
TABLET ORAL
Qty: 30 TABLET | Refills: 0 | Status: SHIPPED | OUTPATIENT
Start: 2021-12-02 | End: 2022-01-09

## 2021-12-02 RX ORDER — HYDROCHLOROTHIAZIDE 12.5 MG/1
TABLET ORAL
Qty: 30 TABLET | Refills: 0 | Status: SHIPPED | OUTPATIENT
Start: 2021-12-02 | End: 2022-01-09

## 2021-12-02 RX ORDER — LOSARTAN POTASSIUM 50 MG/1
TABLET ORAL
Qty: 30 TABLET | Refills: 0 | Status: SHIPPED | OUTPATIENT
Start: 2021-12-02 | End: 2022-01-09

## 2022-01-09 RX ORDER — LOSARTAN POTASSIUM 50 MG/1
TABLET ORAL
Qty: 30 TABLET | Refills: 0 | Status: SHIPPED | OUTPATIENT
Start: 2022-01-09 | End: 2022-01-12 | Stop reason: RX

## 2022-01-09 RX ORDER — CITALOPRAM 40 MG/1
TABLET ORAL
Qty: 60 TABLET | Refills: 0 | Status: SHIPPED | OUTPATIENT
Start: 2022-01-09 | End: 2022-03-10 | Stop reason: SDUPTHER

## 2022-01-09 RX ORDER — BUPROPION HYDROCHLORIDE 150 MG/1
TABLET ORAL
Qty: 30 TABLET | Refills: 0 | Status: SHIPPED | OUTPATIENT
Start: 2022-01-09 | End: 2022-02-19

## 2022-01-09 RX ORDER — HYDROCHLOROTHIAZIDE 12.5 MG/1
TABLET ORAL
Qty: 30 TABLET | Refills: 0 | Status: SHIPPED | OUTPATIENT
Start: 2022-01-09 | End: 2022-02-19

## 2022-01-09 RX ORDER — SIMVASTATIN 40 MG
TABLET ORAL
Qty: 30 TABLET | Refills: 0 | Status: SHIPPED | OUTPATIENT
Start: 2022-01-09 | End: 2022-02-19

## 2022-01-12 ENCOUNTER — TELEPHONE (OUTPATIENT)
Dept: FAMILY MEDICINE CLINIC | Facility: CLINIC | Age: 67
End: 2022-01-12

## 2022-01-12 RX ORDER — IRBESARTAN 150 MG/1
150 TABLET ORAL NIGHTLY
Qty: 90 TABLET | Refills: 0 | OUTPATIENT
Start: 2022-01-12 | End: 2022-03-10 | Stop reason: SDUPTHER

## 2022-01-12 NOTE — TELEPHONE ENCOUNTER
[facility] is out of losartan and it is on backorder. Dr. Johnson suggested changing to Irbesartan 150mg or Valsartan 80mg. The Irbesartan was only $9 for 90 day and the valsartan was $800 for 90 day so I called in rx for irbesartan 150mg #90, no refill. Updating med list.

## 2022-02-19 RX ORDER — HYDROCHLOROTHIAZIDE 12.5 MG/1
TABLET ORAL
Qty: 30 TABLET | Refills: 0 | Status: SHIPPED | OUTPATIENT
Start: 2022-02-19 | End: 2022-03-10 | Stop reason: SDUPTHER

## 2022-02-19 RX ORDER — SIMVASTATIN 40 MG
TABLET ORAL
Qty: 30 TABLET | Refills: 0 | Status: SHIPPED | OUTPATIENT
Start: 2022-02-19 | End: 2022-03-10 | Stop reason: SDUPTHER

## 2022-02-19 RX ORDER — BUPROPION HYDROCHLORIDE 150 MG/1
TABLET ORAL
Qty: 30 TABLET | Refills: 0 | Status: SHIPPED | OUTPATIENT
Start: 2022-02-19 | End: 2022-03-10 | Stop reason: SDUPTHER

## 2022-03-10 ENCOUNTER — HOSPITAL ENCOUNTER (OUTPATIENT)
Dept: GENERAL RADIOLOGY | Facility: HOSPITAL | Age: 67
Discharge: HOME | End: 2022-03-10
Admitting: FAMILY MEDICINE

## 2022-03-10 ENCOUNTER — OFFICE VISIT (OUTPATIENT)
Dept: FAMILY MEDICINE CLINIC | Facility: CLINIC | Age: 67
End: 2022-03-10

## 2022-03-10 VITALS
HEIGHT: 69 IN | RESPIRATION RATE: 16 BRPM | TEMPERATURE: 97.4 F | SYSTOLIC BLOOD PRESSURE: 133 MMHG | WEIGHT: 204 LBS | HEART RATE: 90 BPM | BODY MASS INDEX: 30.21 KG/M2 | DIASTOLIC BLOOD PRESSURE: 77 MMHG | OXYGEN SATURATION: 95 %

## 2022-03-10 DIAGNOSIS — M51.37 DEGENERATION OF LUMBAR OR LUMBOSACRAL INTERVERTEBRAL DISC: ICD-10-CM

## 2022-03-10 DIAGNOSIS — R05.9 COUGH: ICD-10-CM

## 2022-03-10 DIAGNOSIS — E78.2 MIXED HYPERLIPIDEMIA: ICD-10-CM

## 2022-03-10 DIAGNOSIS — Z00.00 MEDICARE ANNUAL WELLNESS VISIT, INITIAL: Primary | ICD-10-CM

## 2022-03-10 DIAGNOSIS — I10 HYPERTENSION, BENIGN: ICD-10-CM

## 2022-03-10 DIAGNOSIS — D50.8 IRON DEFICIENCY ANEMIA SECONDARY TO INADEQUATE DIETARY IRON INTAKE: ICD-10-CM

## 2022-03-10 DIAGNOSIS — J30.1 SEASONAL ALLERGIC RHINITIS DUE TO POLLEN: ICD-10-CM

## 2022-03-10 DIAGNOSIS — Z12.5 SCREENING PSA (PROSTATE SPECIFIC ANTIGEN): ICD-10-CM

## 2022-03-10 PROCEDURE — 1159F MED LIST DOCD IN RCRD: CPT | Performed by: FAMILY MEDICINE

## 2022-03-10 PROCEDURE — 1170F FXNL STATUS ASSESSED: CPT | Performed by: FAMILY MEDICINE

## 2022-03-10 PROCEDURE — G0438 PPPS, INITIAL VISIT: HCPCS | Performed by: FAMILY MEDICINE

## 2022-03-10 PROCEDURE — 71046 X-RAY EXAM CHEST 2 VIEWS: CPT

## 2022-03-10 RX ORDER — IRBESARTAN 150 MG/1
150 TABLET ORAL NIGHTLY
Qty: 90 TABLET | Refills: 0 | OUTPATIENT
Start: 2022-03-10 | End: 2022-09-26 | Stop reason: SDUPTHER

## 2022-03-10 RX ORDER — HYDROCHLOROTHIAZIDE 12.5 MG/1
12.5 TABLET ORAL DAILY
Qty: 90 TABLET | Refills: 3 | Status: SHIPPED | OUTPATIENT
Start: 2022-03-10 | End: 2022-09-26 | Stop reason: SDUPTHER

## 2022-03-10 RX ORDER — CITALOPRAM 40 MG/1
40 TABLET ORAL 2 TIMES DAILY
Qty: 180 TABLET | Refills: 3 | Status: SHIPPED | OUTPATIENT
Start: 2022-03-10 | End: 2022-09-27 | Stop reason: SDUPTHER

## 2022-03-10 RX ORDER — PREDNISONE 20 MG/1
20 TABLET ORAL 2 TIMES DAILY
Qty: 28 TABLET | Refills: 0 | Status: SHIPPED | OUTPATIENT
Start: 2022-03-10 | End: 2023-03-20

## 2022-03-10 RX ORDER — BUPROPION HYDROCHLORIDE 150 MG/1
150 TABLET ORAL DAILY
Qty: 90 TABLET | Refills: 3 | Status: SHIPPED | OUTPATIENT
Start: 2022-03-10 | End: 2022-09-27 | Stop reason: SDUPTHER

## 2022-03-10 RX ORDER — SIMVASTATIN 40 MG
40 TABLET ORAL DAILY
Qty: 90 TABLET | Refills: 3 | Status: SHIPPED | OUTPATIENT
Start: 2022-03-10 | End: 2022-09-27 | Stop reason: SDUPTHER

## 2022-03-10 RX ORDER — LORATADINE 10 MG/1
10 TABLET ORAL DAILY
Qty: 90 TABLET | Refills: 3 | Status: SHIPPED | OUTPATIENT
Start: 2022-03-10

## 2022-03-10 NOTE — PROGRESS NOTES
The ABCs of the Annual Wellness Visit  Initial Medicare Wellness Visit    Chief Complaint   Patient presents with   • Medicare Wellness-subsequent   • Hypertension   • Hyperlipidemia     Subjective   History of Present Illness:  Daniel Cagle is a 66 y.o. male who presents for an Initial Medicare Wellness Visit.    The following portions of the patient's history were reviewed and   updated as appropriate: allergies, current medications, past family history, past medical history, past social history, past surgical history and problem list.     Compared to one year ago, the patient feels his physical   health is the same.    Compared to one year ago, the patient feels his mental   health is the same.    Recent Hospitalizations:  He was not admitted to the hospital during the last year.       Current Medical Providers:  Patient Care Team:  Lyell, Reggie Duane, MD as PCP - General    Outpatient Medications Prior to Visit   Medication Sig Dispense Refill   • buPROPion XL (WELLBUTRIN XL) 150 MG 24 hr tablet Take 1 tablet by mouth once daily 30 tablet 0   • citalopram (CeleXA) 40 MG tablet Take 1 tablet by mouth twice daily 60 tablet 0   • hydroCHLOROthiazide (HYDRODIURIL) 12.5 MG tablet Take 1 tablet by mouth once daily 30 tablet 0   • irbesartan (Avapro) 150 MG tablet Take 1 tablet by mouth Every Night. 90 tablet 0   • loratadine (CLARITIN) 10 MG tablet ALAVERT 10 MG ORAL TABLET     • simvastatin (ZOCOR) 40 MG tablet Take 1 tablet by mouth once daily 30 tablet 0     No facility-administered medications prior to visit.       No opioid medication identified on active medication list. I have reviewed chart for other potential  high risk medication/s and harmful drug interactions in the elderly.          Aspirin is not on active medication list.  Aspirin use is not indicated based on review of current medical condition/s. Risk of harm outweighs potential benefits.  .    Patient Active Problem List   Diagnosis   • Adjustment  disorder   • Allergic rhinitis   • Anemia   • Benign neoplasm of rectum and anal canal   • Congenital hiatus hernia   • Degeneration of lumbar or lumbosacral intervertebral disc   • Diverticulosis of colon   • Dysphagia   • Epididymal cyst   • Erectile dysfunction   • Hyperlipidemia   • Hypertension, benign   • Olecranon bursitis of left elbow   • Medicare annual wellness visit, initial   • Cough     Advance Care Planning  Advance Directive is not on file.  ACP discussion was held with the patient during this visit. Patient does not have an advance directive, information provided.    Review of Systems   Constitutional: Negative for activity change, appetite change, chills, diaphoresis, fatigue and fever.   HENT: Negative for congestion, dental problem, drooling, ear discharge, ear pain, facial swelling, hearing loss, mouth sores, nosebleeds, postnasal drip, rhinorrhea, sinus pressure, sneezing, sore throat, tinnitus, trouble swallowing and voice change.    Eyes: Negative for photophobia, pain, discharge, redness, itching and visual disturbance.   Respiratory: Positive for shortness of breath. Negative for apnea, cough, choking, chest tightness, wheezing and stridor.         Occasionally has some feeling that he cannot catch his breath completely.  This is intermittent and not associated with exercise.   Cardiovascular: Negative for chest pain, palpitations and leg swelling.   Gastrointestinal: Negative for abdominal distention, abdominal pain, anal bleeding, blood in stool, constipation, diarrhea, nausea, rectal pain and vomiting.   Endocrine: Negative for cold intolerance, heat intolerance, polydipsia, polyphagia and polyuria.   Genitourinary: Negative for decreased urine volume, difficulty urinating, discharge, dysuria, enuresis, flank pain, frequency, genital sores, hematuria, penile pain, penile swelling, scrotal swelling, testicular pain and urgency.   Musculoskeletal: Negative for arthralgias, back pain, gait  "problem, joint swelling, myalgias, neck pain and neck stiffness.   Skin: Negative for color change, pallor, rash and wound.   Allergic/Immunologic: Negative for environmental allergies and food allergies.   Neurological: Negative for dizziness, tremors, seizures, syncope, facial asymmetry, speech difficulty, weakness, light-headedness, numbness and confusion.   Hematological: Does not bruise/bleed easily.   Psychiatric/Behavioral: Negative for agitation, decreased concentration, dysphoric mood, sleep disturbance and suicidal ideas. The patient is not nervous/anxious and is not hyperactive.         Objective       Vitals:    03/10/22 1448   BP: 133/77   BP Location: Left arm   Patient Position: Sitting   Cuff Size: Large Adult   Pulse: 90   Resp: 16   Temp: 97.4 °F (36.3 °C)   TempSrc: Infrared   SpO2: 95%   Weight: 92.5 kg (204 lb)   Height: 175.3 cm (69\")     BMI Readings from Last 1 Encounters:   03/10/22 30.13 kg/m²   BMI is above normal parameters. Recommendations include: exercise counseling and nutrition counseling    Does the patient have evidence of cognitive impairment? No    Physical Exam  Constitutional:       Appearance: He is well-developed.   HENT:      Head: Normocephalic and atraumatic.   Eyes:      General: No scleral icterus.        Right eye: No discharge.         Left eye: No discharge.      Conjunctiva/sclera: Conjunctivae normal.      Pupils: Pupils are equal, round, and reactive to light.   Neck:      Thyroid: No thyromegaly.   Cardiovascular:      Rate and Rhythm: Normal rate and regular rhythm.      Heart sounds: Normal heart sounds. No murmur heard.    No friction rub. No gallop.   Pulmonary:      Effort: Pulmonary effort is normal. No respiratory distress.      Breath sounds: Normal breath sounds. No wheezing or rales.   Abdominal:      General: Bowel sounds are normal. There is no distension.      Palpations: Abdomen is soft. There is no mass.      Tenderness: There is no abdominal " tenderness. There is no guarding or rebound.   Musculoskeletal:         General: No tenderness or deformity. Normal range of motion.   Lymphadenopathy:      Cervical: No cervical adenopathy.   Skin:     General: Skin is warm and dry.      Findings: No erythema or rash.   Neurological:      Mental Status: He is alert and oriented to person, place, and time.      Cranial Nerves: No cranial nerve deficit.      Sensory: No sensory deficit.      Motor: No abnormal muscle tone.      Coordination: Coordination normal.      Deep Tendon Reflexes: Reflexes normal.   Psychiatric:         Behavior: Behavior normal.         Thought Content: Thought content normal.         Judgment: Judgment normal.               HEALTH RISK ASSESSMENT    Smoking Status:  Social History     Tobacco Use   Smoking Status Former Smoker   • Packs/day: 1.00   • Years: 5.00   • Pack years: 5.00   • Types: Cigarettes   • Start date:    • Quit date:    • Years since quittin.2   Smokeless Tobacco Never Used     Alcohol Consumption:  Social History     Substance and Sexual Activity   Alcohol Use No     Fall Risk Screen:    KASHIFADI Fall Risk Assessment was completed, and patient is at LOW risk for falls.Assessment completed on:3/10/2022    Depression Screen:   PHQ-2/PHQ-9 Depression Screening 3/4/2021   Retired Total Score 0       Health Habits and Functional and Cognitive Screening:  Functional & Cognitive Status 3/10/2022   Do you have difficulty preparing food and eating? No   Do you have difficulty bathing yourself, getting dressed or grooming yourself? No   Do you have difficulty using the toilet? No   Do you have difficulty moving around from place to place? No   Do you have trouble with steps or getting out of a bed or a chair? No   Current Diet Well Balanced Diet   Dental Exam Up to date   Eye Exam Up to date   Exercise (times per week) 0 times per week   Current Exercises Include No Regular Exercise   Current Exercise Activities Include  -   Do you need help using the phone?  No   Are you deaf or do you have serious difficulty hearing?  No   Do you need help with transportation? No   Do you need help shopping? No   Do you need help preparing meals?  No   Do you need help with housework?  No   Do you need help with laundry? No   Do you need help taking your medications? No   Do you need help managing money? No   Do you ever drive or ride in a car without wearing a seat belt? No   Have you felt unusual stress, anger or loneliness in the last month? No   Who do you live with? Alone   If you need help, do you have trouble finding someone available to you? No   Have you been bothered in the last four weeks by sexual problems? No   Do you have difficulty concentrating, remembering or making decisions? No       Age-appropriate Screening Schedule:  Refer to the list below for future screening recommendations based on patient's age, sex and/or medical conditions. Orders for these recommended tests are listed in the plan section. The patient has been provided with a written plan.    Health Maintenance   Topic Date Due   • ZOSTER VACCINE (1 of 2) Never done   • INFLUENZA VACCINE  Never done   • LIPID PANEL  04/26/2022   • TDAP/TD VACCINES (2 - Td or Tdap) 12/07/2030            Assessment/Plan   CMS Preventative Services Quick Reference  Risk Factors Identified During Encounter  Dementia/Memory   Depression/Dysphoria  Inactivity/Sedentary  The above risks/problems have been discussed with the patient.  Follow up actions/plans if indicated are seen below in the Assessment/Plan Section.  Pertinent information has been shared with the patient in the After Visit Summary.    Diagnoses and all orders for this visit:    1. Medicare annual wellness visit, initial (Primary)  Assessment & Plan:  Updated annual wellness visit checklist.  Immunizations discussed.  Screening up-to-date.  Recommend yearly dental and eye exams. Also discussed monitoring of blood pressure and  lipids.      2. Seasonal allergic rhinitis due to pollen  Assessment & Plan:  Stable with occasional OTC antihistamine.      3. Mixed hyperlipidemia  Assessment & Plan:  Recheck blood work in April and call with results.    Orders:  -     Comprehensive Metabolic Panel; Future  -     Lipid Panel; Future    4. Hypertension, benign  Assessment & Plan:  Stable.  Continue hydrochlorothiazide, irbesartan.    Orders:  -     CBC & Differential; Future    5. Iron deficiency anemia secondary to inadequate dietary iron intake  Assessment & Plan:  Recheck blood work and call with results.      6. Degeneration of lumbar or lumbosacral intervertebral disc  Assessment & Plan:  At baseline.      7. Screening PSA (prostate specific antigen)  -     PSA Screen; Future    8. Cough  Assessment & Plan:  Associated with the sensation that he cannot catch his breath.  We will check a chest x-ray.  May be pleurisy.  Trial of steroids.  Pulmonary function studies if no better.    Orders:  -     XR Chest 2 View; Future    Other orders  -     simvastatin (ZOCOR) 40 MG tablet; Take 1 tablet by mouth Daily.  Dispense: 90 tablet; Refill: 3  -     loratadine (CLARITIN) 10 MG tablet; Take 1 tablet by mouth Daily.  Dispense: 90 tablet; Refill: 3  -     irbesartan (Avapro) 150 MG tablet; Take 1 tablet by mouth Every Night.  Dispense: 90 tablet; Refill: 0  -     hydroCHLOROthiazide (HYDRODIURIL) 12.5 MG tablet; Take 1 tablet by mouth Daily.  Dispense: 90 tablet; Refill: 3  -     citalopram (CeleXA) 40 MG tablet; Take 1 tablet by mouth 2 (Two) Times a Day.  Dispense: 180 tablet; Refill: 3  -     buPROPion XL (WELLBUTRIN XL) 150 MG 24 hr tablet; Take 1 tablet by mouth Daily.  Dispense: 90 tablet; Refill: 3  -     predniSONE (DELTASONE) 20 MG tablet; Take 1 tablet by mouth 2 (Two) Times a Day.  Dispense: 28 tablet; Refill: 0      Follow Up:  No follow-ups on file.     An After Visit Summary and PPPS were made available to the patient.

## 2022-03-10 NOTE — ASSESSMENT & PLAN NOTE
Associated with the sensation that he cannot catch his breath.  We will check a chest x-ray.  May be pleurisy.  Trial of steroids.  Pulmonary function studies if no better.

## 2022-04-21 ENCOUNTER — OFFICE VISIT (OUTPATIENT)
Dept: FAMILY MEDICINE CLINIC | Facility: CLINIC | Age: 67
End: 2022-04-21

## 2022-04-21 VITALS
WEIGHT: 201 LBS | HEIGHT: 69 IN | SYSTOLIC BLOOD PRESSURE: 144 MMHG | TEMPERATURE: 97.5 F | RESPIRATION RATE: 16 BRPM | HEART RATE: 64 BPM | OXYGEN SATURATION: 97 % | BODY MASS INDEX: 29.77 KG/M2 | DIASTOLIC BLOOD PRESSURE: 89 MMHG

## 2022-04-21 DIAGNOSIS — R42 DIZZINESS: Primary | ICD-10-CM

## 2022-04-21 DIAGNOSIS — R10.13 EPIGASTRIC PAIN: ICD-10-CM

## 2022-04-21 PROCEDURE — 99214 OFFICE O/P EST MOD 30 MIN: CPT | Performed by: FAMILY MEDICINE

## 2022-04-21 PROCEDURE — 93000 ELECTROCARDIOGRAM COMPLETE: CPT | Performed by: FAMILY MEDICINE

## 2022-04-21 NOTE — ASSESSMENT & PLAN NOTE
GustEKG is unremarkable.  I suspect he has colitis or possibly an ulcer.  He may have some anemia from this.  We will check a CBC.  We will also check pancreatic enzymes and call with results.  Since he has had symptoms for several days and is not hypotensive or tachycardic I do not suspect that he has significant blood loss acutely.  We will have him take the Nexium 23 mg twice daily and will notify him of results of his blood work.  Warning signs discussed.  If he develops chest pain or worsening symptoms he is to go to the emergency room.

## 2022-04-21 NOTE — PROGRESS NOTES
"Chief Complaint  Extremity Weakness    Subjective         Daniel Cagle presents for Weakness epigastric pain.  Patient has a 3 to 4-day history of worsening epigastric pain that actually started 3 weeks ago.  He had been on prednisone recently.  Pain radiates through to his back.  He has had some significant weakness without vomiting or diarrhea.  He is not any blood in his stool.  He denies chest pain or palpitations.       PHQ-2 Total Score:    PHQ-9 Total Score:      Review of Systems   Constitutional: Negative for chills, fatigue and fever.   Respiratory: Negative for cough and shortness of breath.    Cardiovascular: Negative for chest pain and palpitations.   Gastrointestinal: Positive for abdominal pain. Negative for constipation, diarrhea, nausea and vomiting.   Musculoskeletal: Negative for back pain and neck pain.   Skin: Negative for rash.   Neurological: Positive for weakness. Negative for dizziness and headaches.       Objective   Vital Signs:   /89 (BP Location: Left arm, Patient Position: Sitting, Cuff Size: Large Adult)   Pulse 64   Temp 97.5 °F (36.4 °C) (Infrared)   Resp 16   Ht 175.3 cm (69\")   Wt 91.2 kg (201 lb)   SpO2 97%   BMI 29.68 kg/m²     Physical Exam  Constitutional:       General: He is not in acute distress.     Appearance: He is well-developed.   Cardiovascular:      Rate and Rhythm: Normal rate and regular rhythm.      Heart sounds: Normal heart sounds. No murmur heard.  Pulmonary:      Effort: Pulmonary effort is normal.      Breath sounds: Normal breath sounds. No wheezing or rales.   Abdominal:      General: Bowel sounds are normal. There is no distension.      Palpations: Abdomen is soft.   Musculoskeletal:         General: Normal range of motion.   Skin:     General: Skin is warm and dry.   Neurological:      Mental Status: He is alert and oriented to person, place, and time.   Psychiatric:         Behavior: Behavior normal.        Result Review :            ECG 12 " Lead    Date/Time: 4/21/2022 1:33 PM  Performed by: Lyell, Reggie Duane, MD  Authorized by: Lyell, Reggie Duane, MD   Previous ECG: no previous ECG available  Rhythm: sinus rhythm  Rate: normal  Conduction: conduction normal  ST Elevation: II and aVF  T Waves: T waves normal  QRS axis: normal  Other: no other findings    Clinical impression: abnormal EKG  Comments: Nonspecific changes, with borderline EKG.                Diagnoses and all orders for this visit:    1. Dizziness (Primary)  -     ECG 12 Lead    2. Epigastric pain  Assessment & Plan:  GustEKG is unremarkable.  I suspect he has colitis or possibly an ulcer.  He may have some anemia from this.  We will check a CBC.  We will also check pancreatic enzymes and call with results.  Since he has had symptoms for several days and is not hypotensive or tachycardic I do not suspect that he has significant blood loss acutely.  We will have him take the Nexium 23 mg twice daily and will notify him of results of his blood work.  Warning signs discussed.  If he develops chest pain or worsening symptoms he is to go to the emergency room.    Orders:  -     CBC & Differential  -     Comprehensive Metabolic Panel  -     Lipase  -     Amylase              Follow Up   No follow-ups on file.  Patient was given instructions and counseling regarding his condition or for health maintenance advice. Please see specific information pulled into the AVS if appropriate.     Reggie Duane Lyell, MD  4/21/2022  This note was electronically signed.

## 2022-04-22 LAB
ALBUMIN SERPL-MCNC: 4.5 G/DL (ref 3.8–4.8)
ALBUMIN/GLOB SERPL: 2.5 {RATIO} (ref 1.2–2.2)
ALP SERPL-CCNC: 118 IU/L (ref 44–121)
ALT SERPL-CCNC: 15 IU/L (ref 0–44)
AMYLASE SERPL-CCNC: 47 U/L (ref 31–110)
AST SERPL-CCNC: 16 IU/L (ref 0–40)
BASOPHILS # BLD AUTO: 0 X10E3/UL (ref 0–0.2)
BASOPHILS NFR BLD AUTO: 1 %
BILIRUB SERPL-MCNC: 0.4 MG/DL (ref 0–1.2)
BUN SERPL-MCNC: 14 MG/DL (ref 8–27)
BUN/CREAT SERPL: 17 (ref 10–24)
CALCIUM SERPL-MCNC: 9.1 MG/DL (ref 8.6–10.2)
CHLORIDE SERPL-SCNC: 93 MMOL/L (ref 96–106)
CO2 SERPL-SCNC: 25 MMOL/L (ref 20–29)
CREAT SERPL-MCNC: 0.84 MG/DL (ref 0.76–1.27)
EGFRCR SERPLBLD CKD-EPI 2021: 96 ML/MIN/1.73
EOSINOPHIL # BLD AUTO: 0.1 X10E3/UL (ref 0–0.4)
EOSINOPHIL NFR BLD AUTO: 1 %
ERYTHROCYTE [DISTWIDTH] IN BLOOD BY AUTOMATED COUNT: 13.8 % (ref 11.6–15.4)
GLOBULIN SER CALC-MCNC: 1.8 G/DL (ref 1.5–4.5)
GLUCOSE SERPL-MCNC: 96 MG/DL (ref 65–99)
HCT VFR BLD AUTO: 42.2 % (ref 37.5–51)
HGB BLD-MCNC: 13.9 G/DL (ref 13–17.7)
IMM GRANULOCYTES # BLD AUTO: 0.1 X10E3/UL (ref 0–0.1)
IMM GRANULOCYTES NFR BLD AUTO: 1 %
LIPASE SERPL-CCNC: 26 U/L (ref 13–78)
LYMPHOCYTES # BLD AUTO: 0.2 X10E3/UL (ref 0.7–3.1)
LYMPHOCYTES NFR BLD AUTO: 4 %
MCH RBC QN AUTO: 29.3 PG (ref 26.6–33)
MCHC RBC AUTO-ENTMCNC: 32.9 G/DL (ref 31.5–35.7)
MCV RBC AUTO: 89 FL (ref 79–97)
MONOCYTES # BLD AUTO: 0.6 X10E3/UL (ref 0.1–0.9)
MONOCYTES NFR BLD AUTO: 11 %
NEUTROPHILS # BLD AUTO: 4 X10E3/UL (ref 1.4–7)
NEUTROPHILS NFR BLD AUTO: 82 %
PLATELET # BLD AUTO: 393 X10E3/UL (ref 150–450)
POTASSIUM SERPL-SCNC: 4 MMOL/L (ref 3.5–5.2)
PROT SERPL-MCNC: 6.3 G/DL (ref 6–8.5)
RBC # BLD AUTO: 4.74 X10E6/UL (ref 4.14–5.8)
SODIUM SERPL-SCNC: 133 MMOL/L (ref 134–144)
WBC # BLD AUTO: 5 X10E3/UL (ref 3.4–10.8)

## 2022-04-26 ENCOUNTER — TELEPHONE (OUTPATIENT)
Dept: FAMILY MEDICINE CLINIC | Facility: CLINIC | Age: 67
End: 2022-04-26

## 2022-04-26 NOTE — TELEPHONE ENCOUNTER
----- Message from Reggie Duane Lyell, MD sent at 3/10/2022  3:23 PM EST -----  Call to remind him to do blood work

## 2022-05-10 RX ORDER — IRBESARTAN 150 MG/1
TABLET ORAL
Qty: 90 TABLET | Refills: 0 | OUTPATIENT
Start: 2022-05-10

## 2022-05-10 RX ORDER — SIMVASTATIN 40 MG
TABLET ORAL
Qty: 30 TABLET | Refills: 0 | OUTPATIENT
Start: 2022-05-10

## 2022-05-11 RX ORDER — IRBESARTAN 150 MG/1
TABLET ORAL
Qty: 90 TABLET | Refills: 0 | OUTPATIENT
Start: 2022-05-11

## 2022-08-29 RX ORDER — IRBESARTAN 150 MG/1
TABLET ORAL
Qty: 90 TABLET | Refills: 0 | OUTPATIENT
Start: 2022-08-29

## 2022-09-26 ENCOUNTER — TELEPHONE (OUTPATIENT)
Dept: FAMILY MEDICINE CLINIC | Facility: CLINIC | Age: 67
End: 2022-09-26

## 2022-09-26 DIAGNOSIS — I10 HYPERTENSION, UNSPECIFIED TYPE: Primary | ICD-10-CM

## 2022-09-26 RX ORDER — IRBESARTAN 150 MG/1
150 TABLET ORAL NIGHTLY
Qty: 30 TABLET | Refills: 0 | OUTPATIENT
Start: 2022-09-26 | End: 2022-09-27 | Stop reason: SDUPTHER

## 2022-09-26 RX ORDER — HYDROCHLOROTHIAZIDE 12.5 MG/1
12.5 TABLET ORAL DAILY
Qty: 30 TABLET | Refills: 0 | Status: SHIPPED | OUTPATIENT
Start: 2022-09-26 | End: 2022-09-27 | Stop reason: SDUPTHER

## 2022-09-26 NOTE — TELEPHONE ENCOUNTER
Caller: Daniel Cagle    Relationship: Self    Best call back number: [phone]    Requested Prescriptions:       irbesartan (Avapro) 150 MG tablet         Pharmacy where request should be sent:    [facility]  [place], [place] - [address]  - [phone]  - [phone]   [phone]    Additional details provided by patient: PATIENT IS CALLING TO STATE HIS BLOOD PRESSURE IS ELEVATED /99.  HE STATES HE HAS A A HEADACHE AS WELL.  HE STATES HE HAS NOT HAD THE MEDICATION FOR ABOUT 2 WEEKS.  HE HAS AN APPOINTMENT TOMORROW, BUT WANTS TO KNOW IF HE COULD GET AN EMERGENCY SUPPLY OF THE MEDICATION DUE TO ELEVATION.  HE STATES HE IS GETTING CONCERNED.    PLEASE ADVISE.    Does the patient have less than a 3 day supply:  [x] Yes  [] No    Carolina Swained Rep   09/26/22 12:13 EDT

## 2022-09-26 NOTE — TELEPHONE ENCOUNTER
"Called pt and relayed message per Chapis:    \"Please contact the patient and advise him I sent a 30 day supply in.  I advise that he seek emergent care for uncontrolled hypertension if he is worried.  \"    Pt confirmed   "

## 2022-09-27 ENCOUNTER — OFFICE VISIT (OUTPATIENT)
Dept: FAMILY MEDICINE CLINIC | Facility: CLINIC | Age: 67
End: 2022-09-27

## 2022-09-27 VITALS
DIASTOLIC BLOOD PRESSURE: 94 MMHG | BODY MASS INDEX: 29.62 KG/M2 | SYSTOLIC BLOOD PRESSURE: 155 MMHG | HEIGHT: 69 IN | OXYGEN SATURATION: 98 % | HEART RATE: 87 BPM | RESPIRATION RATE: 16 BRPM | TEMPERATURE: 97.3 F | WEIGHT: 200 LBS

## 2022-09-27 DIAGNOSIS — M67.431 GANGLION CYST OF WRIST, RIGHT: ICD-10-CM

## 2022-09-27 DIAGNOSIS — R74.8 ELEVATED ALKALINE PHOSPHATASE LEVEL: Primary | ICD-10-CM

## 2022-09-27 DIAGNOSIS — E78.2 MIXED HYPERLIPIDEMIA: ICD-10-CM

## 2022-09-27 DIAGNOSIS — I10 HYPERTENSION, BENIGN: ICD-10-CM

## 2022-09-27 DIAGNOSIS — F43.20 ADJUSTMENT DISORDER, UNSPECIFIED TYPE: ICD-10-CM

## 2022-09-27 LAB
BILIRUB BLD-MCNC: NEGATIVE MG/DL
CLARITY, POC: CLEAR
COLOR UR: YELLOW
EXPIRATION DATE: NORMAL
GLUCOSE UR STRIP-MCNC: NEGATIVE MG/DL
KETONES UR QL: NEGATIVE
LEUKOCYTE EST, POC: NEGATIVE
Lab: NORMAL
NITRITE UR-MCNC: NEGATIVE MG/ML
PH UR: 5.5 [PH] (ref 5–8)
PROT UR STRIP-MCNC: NEGATIVE MG/DL
RBC # UR STRIP: NEGATIVE /UL
SP GR UR: 1.02 (ref 1–1.03)
UROBILINOGEN UR QL: NORMAL

## 2022-09-27 PROCEDURE — 99214 OFFICE O/P EST MOD 30 MIN: CPT

## 2022-09-27 PROCEDURE — 81003 URINALYSIS AUTO W/O SCOPE: CPT

## 2022-09-27 RX ORDER — SIMVASTATIN 40 MG
40 TABLET ORAL DAILY
Qty: 90 TABLET | Refills: 2 | Status: SHIPPED | OUTPATIENT
Start: 2022-09-27

## 2022-09-27 RX ORDER — HYDROCHLOROTHIAZIDE 12.5 MG/1
12.5 TABLET ORAL DAILY
Qty: 90 TABLET | Refills: 2 | Status: SHIPPED | OUTPATIENT
Start: 2022-09-27 | End: 2022-10-11 | Stop reason: SDUPTHER

## 2022-09-27 RX ORDER — IRBESARTAN 150 MG/1
150 TABLET ORAL NIGHTLY
Qty: 90 TABLET | Refills: 2 | Status: SHIPPED | OUTPATIENT
Start: 2022-09-27

## 2022-09-27 RX ORDER — CITALOPRAM 40 MG/1
40 TABLET ORAL 2 TIMES DAILY
Qty: 180 TABLET | Refills: 2 | Status: SHIPPED | OUTPATIENT
Start: 2022-09-27

## 2022-09-27 RX ORDER — BUPROPION HYDROCHLORIDE 150 MG/1
150 TABLET ORAL DAILY
Qty: 90 TABLET | Refills: 2 | Status: SHIPPED | OUTPATIENT
Start: 2022-09-27

## 2022-09-27 NOTE — PROGRESS NOTES
Chief Complaint  Hyperlipidemia and Hypertension    Subjective        Daniel Cagle presents to [facility] for  History of Present Illness  Patient is here to establish care with new provider.  He was previously seen by another provider is not practicing at this location.  Medical history and review of system is as noted below.  Patient reports being out of blood pressure medication for approximately 2 weeks.  He is here with elevated blood pressure.  All medications have been refilled.  He denies any chest pain, palpitations, shortness of breath, or swelling in his legs or feet.  He does have a history of anxiety depression and is well controlled on current medications.  He states he has a history of urinary frequency at nighttime erectile dysfunction and is not interested in erectile dysfunction medication at this moment.  PSA level drawn in April of this year.  We will redraw in April next year.    He has an elevated alk phos the last 2 draws we will repeat that and plan of care will be completed pending results.  He does present with a ganglion cyst of his right wrist that he denies needing any further treatment for.  He will follow-up in 2 weeks for blood pressure recheck.    Immunization counseling was discussed.  He is not interested at the current time.    He does check his blood pressures with elevations running in the 170s.  When he does have his medication he is normally well controlled.        Daniel Cagle  has a past medical history of Adult situational stress disorder, Allergic rhinitis, Anemia, Congenital hiatus hernia, Diverticulosis, Dysphagia, ED (erectile dysfunction), Hyperlipidemia, Hypertension, and Olecranon bursitis.      Review of Systems   Constitutional: Negative for chills, fatigue and fever.   Respiratory: Negative for cough, shortness of breath and wheezing.    Cardiovascular: Negative for chest pain, palpitations and leg swelling.   Gastrointestinal:  "Negative for blood in stool, constipation, diarrhea, nausea and vomiting.   Genitourinary: Positive for frequency. Negative for difficulty urinating.   Musculoskeletal: Positive for arthralgias, back pain and joint swelling.   Allergic/Immunologic: Negative for environmental allergies and food allergies.   Neurological: Negative for dizziness, weakness and headaches.   Psychiatric/Behavioral: Positive for decreased concentration. Negative for self-injury, sleep disturbance and suicidal ideas. The patient is nervous/anxious.         Objective       Current Outpatient Medications:   •  buPROPion XL (WELLBUTRIN XL) 150 MG 24 hr tablet, Take 1 tablet by mouth Daily., Disp: 90 tablet, Rfl: 2  •  citalopram (CeleXA) 40 MG tablet, Take 1 tablet by mouth 2 (Two) Times a Day., Disp: 180 tablet, Rfl: 2  •  hydroCHLOROthiazide (HYDRODIURIL) 12.5 MG tablet, Take 1 tablet by mouth Daily., Disp: 90 tablet, Rfl: 2  •  irbesartan (Avapro) 150 MG tablet, Take 1 tablet by mouth Every Night., Disp: 90 tablet, Rfl: 2  •  loratadine (CLARITIN) 10 MG tablet, Take 1 tablet by mouth Daily., Disp: 90 tablet, Rfl: 3  •  simvastatin (ZOCOR) 40 MG tablet, Take 1 tablet by mouth Daily., Disp: 90 tablet, Rfl: 2  •  predniSONE (DELTASONE) 20 MG tablet, Take 1 tablet by mouth 2 (Two) Times a Day., Disp: 28 tablet, Rfl: 0    Vital Signs:  /94 (BP Location: Left arm, Patient Position: Sitting, Cuff Size: Adult)   Pulse 87   Temp 97.3 °F (36.3 °C) (Infrared)   Resp 16   Ht 175.3 cm (69\")   Wt 90.7 kg (200 lb)   SpO2 98%   BMI 29.53 kg/m²   Vitals:    09/27/22 1026   BP: 155/94   BP Location: Left arm   Patient Position: Sitting   Cuff Size: Adult   Pulse: 87   Resp: 16   Temp: 97.3 °F (36.3 °C)   TempSrc: Infrared   SpO2: 98%   Weight: 90.7 kg (200 lb)   Height: 175.3 cm (69\")        Estimated body mass index is 29.53 kg/m² as calculated from the following:    Height as of this encounter: 175.3 cm (69\").    Weight as of this encounter: " 90.7 kg (200 lb).    BMI is >= 25 and <30. (Overweight) The following options were offered after discussion;: weight loss educational material (shared in after visit summary) and exercise counseling/recommendations      Physical Exam  Vitals and nursing note reviewed.   Constitutional:       General: He is not in acute distress.     Appearance: Normal appearance. He is well-groomed and normal weight.   Cardiovascular:      Rate and Rhythm: Normal rate and regular rhythm.      Pulses: Normal pulses.      Heart sounds: Normal heart sounds.   Pulmonary:      Effort: Pulmonary effort is normal.      Breath sounds: Normal breath sounds.   Abdominal:      Palpations: Abdomen is not rigid.   Skin:     General: Skin is warm and dry.      Capillary Refill: Capillary refill takes less than 2 seconds.   Neurological:      Mental Status: He is oriented to person, place, and time.   Psychiatric:         Mood and Affect: Mood normal.         Behavior: Behavior normal. Behavior is cooperative.        Result Review :  The following data was reviewed by: THU Mccarthy on 09/27/2022:  Common labs    Common Labs 4/21/22 4/21/22 4/28/22 4/28/22 4/28/22 4/28/22    1407 1407 0843 0843 0843 0843   Glucose  96  101 (A)     BUN  14  11     Creatinine  0.84  0.83     Sodium  133 (A)  137     Potassium  4.0  4.6     Chloride  93 (A)  97     Calcium  9.1  9.4     Total Protein  6.3  6.2     Albumin  4.5  4.3     Total Bilirubin  0.4  0.3     Alkaline Phosphatase  118  126 (A)     AST (SGOT)  16  16     ALT (SGPT)  15  14     WBC 5.0  4.2      Hemoglobin 13.9  13.7      Hematocrit 42.2  43.1      Platelets 393  411      Total Cholesterol     159    Triglycerides     64    HDL Cholesterol     69    LDL Cholesterol      77    PSA      3.0   (A) Abnormal value       Comments are available for some flowsheets but are not being displayed.                  PHQ-9 Total Score: 0        Assessment and Plan   Diagnoses and all orders for this  visit:    1. Elevated alkaline phosphatase level (Primary)  -     CBC Auto Differential  -     Comprehensive Metabolic Panel  -     POC Urinalysis Dipstick, Automated    2. Mixed hyperlipidemia  Assessment & Plan:  Lipid levels will be checked.  Continue diet, lifestyle and medication.       Orders:  -     simvastatin (ZOCOR) 40 MG tablet; Take 1 tablet by mouth Daily.  Dispense: 90 tablet; Refill: 2    3. Hypertension, benign  -     CBC Auto Differential  -     Comprehensive Metabolic Panel  -     hydroCHLOROthiazide (HYDRODIURIL) 12.5 MG tablet; Take 1 tablet by mouth Daily.  Dispense: 90 tablet; Refill: 2  -     irbesartan (Avapro) 150 MG tablet; Take 1 tablet by mouth Every Night.  Dispense: 90 tablet; Refill: 2  -     POC Urinalysis Dipstick, Automated    4. Ganglion cyst of wrist, right    5. Adjustment disorder, unspecified type  -     buPROPion XL (WELLBUTRIN XL) 150 MG 24 hr tablet; Take 1 tablet by mouth Daily.  Dispense: 90 tablet; Refill: 2  -     citalopram (CeleXA) 40 MG tablet; Take 1 tablet by mouth 2 (Two) Times a Day.  Dispense: 180 tablet; Refill: 2           Follow Up   Return in about 2 weeks (around 10/11/2022), or b/p check., for Recheck, Annual physical 3/2023.  Patient was given instructions and counseling regarding his condition or for health maintenance advice. Please see specific information pulled into the AVS if appropriate.     I wore protective equipment  (mask and gloves if needed) throughout this patient encounter. Hand hygiene was performed before and after patient encounter.        Findings discussed.  All questions answered.

## 2022-10-11 ENCOUNTER — OFFICE VISIT (OUTPATIENT)
Dept: FAMILY MEDICINE CLINIC | Facility: CLINIC | Age: 67
End: 2022-10-11

## 2022-10-11 VITALS
RESPIRATION RATE: 16 BRPM | TEMPERATURE: 97.4 F | WEIGHT: 200 LBS | DIASTOLIC BLOOD PRESSURE: 92 MMHG | BODY MASS INDEX: 29.62 KG/M2 | OXYGEN SATURATION: 97 % | HEIGHT: 69 IN | HEART RATE: 63 BPM | SYSTOLIC BLOOD PRESSURE: 148 MMHG

## 2022-10-11 DIAGNOSIS — Z87.19 HISTORY OF ESOPHAGEAL STRICTURE: ICD-10-CM

## 2022-10-11 DIAGNOSIS — I10 HYPERTENSION, BENIGN: Primary | ICD-10-CM

## 2022-10-11 DIAGNOSIS — Z12.11 SCREENING FOR MALIGNANT NEOPLASM OF COLON: ICD-10-CM

## 2022-10-11 DIAGNOSIS — K08.59 LOSS OF FILLING FROM ACCESS HOLE OF TOOTH: ICD-10-CM

## 2022-10-11 PROCEDURE — 99214 OFFICE O/P EST MOD 30 MIN: CPT

## 2022-10-11 RX ORDER — HYDROCHLOROTHIAZIDE 12.5 MG/1
25 TABLET ORAL DAILY
Qty: 60 TABLET | Refills: 1 | Status: SHIPPED | OUTPATIENT
Start: 2022-10-11 | End: 2023-03-20 | Stop reason: SDUPTHER

## 2022-10-11 NOTE — PROGRESS NOTES
Answers for HPI/ROS submitted by the patient on 10/10/2022  What is the primary reason for your visit?: Other  Please describe your symptoms.: I previously had an appointment to correct a problem with a prescription.  The Nurse Practitioner scheduled this appointment.  Have you had these symptoms before?: No  How long have you been having these symptoms?: Greater than 2 weeks  Please describe any probable cause for these symptoms. : I'm stuck in the system.    Chief Complaint  Hypertension    Subjective        Daniel Cagle presents to [facility] for  History of Present Illness    Patient is here for blood pressure recheck.  He reports at home his blood pressure runs in the 130s over 80s to low 90s diastolic.  He denies any chest pain, palpitations, shortness of breath, or swelling in his legs or feet.  His blood pressure at today's visit is currently still uncontrolled we will increase his hydrochlorothiazide to 25 mg a day and he will return in the near future for recheck.    He does report a recent history of losing a filling in his left upper molar in 2019 but reports new discomfort this week.  He states that he lost a small white hard substance possibly a tooth chip  when he was eating Fritos.  He has a dentist appointment in the near future to manage this.    He has a current complaint of being unable to consume less.  He notes that when he eats chicken breast he feels like it involves a in his esophagus.  He feels that there is a sticking sensation of the food and he drinks liquids before sitting on.  He avoids chicken breast at all cost.  He does have a history of an EGD and scope with a balloon dilation at the sphincter of the esophagus.  Multiple imaging and states he has a moderate to large hiatal hernia but upon examination of the most recent scope and EGD he was instructed that he does not have a hernia.  He is concerned about that would like further follow-up care for  "management of food becoming lodged.  I advised him to avoid foods that could essentially become lodged until he has seen gastroenterology.          Daniel Cagle  has a past medical history of Adult situational stress disorder, Allergic rhinitis, Anemia, Congenital hiatus hernia, Diverticulosis, Dysphagia, ED (erectile dysfunction), Hyperlipidemia, Hypertension, and Olecranon bursitis.      Review of Systems   Constitutional: Negative for activity change, appetite change, chills, fatigue, fever and unexpected weight change.   HENT: Positive for trouble swallowing. Negative for mouth sores and tinnitus.    Respiratory: Negative for cough, shortness of breath and wheezing.    Cardiovascular: Negative for chest pain, palpitations and leg swelling.   Gastrointestinal: Negative for abdominal distention, abdominal pain, anal bleeding, blood in stool, constipation, diarrhea, nausea and vomiting.   Neurological: Negative for dizziness, weakness and headaches.        Objective       Current Outpatient Medications:   •  hydroCHLOROthiazide (HYDRODIURIL) 12.5 MG tablet, Take 2 tablets by mouth Daily for 90 days., Disp: 60 tablet, Rfl: 1  •  buPROPion XL (WELLBUTRIN XL) 150 MG 24 hr tablet, Take 1 tablet by mouth Daily., Disp: 90 tablet, Rfl: 2  •  citalopram (CeleXA) 40 MG tablet, Take 1 tablet by mouth 2 (Two) Times a Day., Disp: 180 tablet, Rfl: 2  •  irbesartan (Avapro) 150 MG tablet, Take 1 tablet by mouth Every Night., Disp: 90 tablet, Rfl: 2  •  loratadine (CLARITIN) 10 MG tablet, Take 1 tablet by mouth Daily., Disp: 90 tablet, Rfl: 3  •  predniSONE (DELTASONE) 20 MG tablet, Take 1 tablet by mouth 2 (Two) Times a Day., Disp: 28 tablet, Rfl: 0  •  simvastatin (ZOCOR) 40 MG tablet, Take 1 tablet by mouth Daily., Disp: 90 tablet, Rfl: 2    Vital Signs:  /92 (BP Location: Left arm, Patient Position: Sitting, Cuff Size: Adult)   Pulse 63   Temp 97.4 °F (36.3 °C) (Infrared)   Resp 16   Ht 175.3 cm (69\")   Wt 90.7 kg " "(200 lb)   SpO2 97%   BMI 29.53 kg/m²   Vitals:    10/11/22 0816   BP: 148/92   BP Location: Left arm   Patient Position: Sitting   Cuff Size: Adult   Pulse: 63   Resp: 16   Temp: 97.4 °F (36.3 °C)   TempSrc: Infrared   SpO2: 97%   Weight: 90.7 kg (200 lb)   Height: 175.3 cm (69\")        Estimated body mass index is 29.53 kg/m² as calculated from the following:    Height as of this encounter: 175.3 cm (69\").    Weight as of this encounter: 90.7 kg (200 lb).    BMI is >= 25 and <30. (Overweight) The following options were offered after discussion;: weight loss educational material (shared in after visit summary), exercise counseling/recommendations and nutrition counseling/recommendations      Physical Exam  Vitals and nursing note reviewed.   Constitutional:       General: He is not in acute distress.     Appearance: Normal appearance. He is well-groomed and normal weight.   Cardiovascular:      Rate and Rhythm: Normal rate and regular rhythm.      Pulses: Normal pulses.      Heart sounds: Normal heart sounds.   Pulmonary:      Effort: Pulmonary effort is normal.      Breath sounds: Normal breath sounds.   Abdominal:      Palpations: Abdomen is not rigid.   Skin:     General: Skin is warm and dry.      Capillary Refill: Capillary refill takes less than 2 seconds.   Neurological:      Mental Status: He is oriented to person, place, and time.   Psychiatric:         Mood and Affect: Mood normal.         Behavior: Behavior normal. Behavior is cooperative.        Result Review :  The following data was reviewed by: THU Mccarthy on 10/11/2022:  Common labs    Common Labs 4/21/22 4/21/22 4/28/22 4/28/22 4/28/22 4/28/22    1407 1407 0843 0843 0843 0843   Glucose  96  101 (A)     BUN  14  11     Creatinine  0.84  0.83     Sodium  133 (A)  137     Potassium  4.0  4.6     Chloride  93 (A)  97     Calcium  9.1  9.4     Total Protein  6.3  6.2     Albumin  4.5  4.3     Total Bilirubin  0.4  0.3     Alkaline " Phosphatase  118  126 (A)     AST (SGOT)  16  16     ALT (SGPT)  15  14     WBC 5.0  4.2      Hemoglobin 13.9  13.7      Hematocrit 42.2  43.1      Platelets 393  411      Total Cholesterol     159    Triglycerides     64    HDL Cholesterol     69    LDL Cholesterol      77    PSA      3.0   (A) Abnormal value       Comments are available for some flowsheets but are not being displayed.           Data reviewed: Radiologic studies XR imaging and GI studies Dr. Power       PHQ-9 Total Score:          Assessment and Plan   Diagnoses and all orders for this visit:    1. Hypertension, benign (Primary)  -     hydroCHLOROthiazide (HYDRODIURIL) 12.5 MG tablet; Take 2 tablets by mouth Daily for 90 days.  Dispense: 60 tablet; Refill: 1  -     CBC Auto Differential  -     Comprehensive Metabolic Panel  -     Lipid Panel  -     TSH Rfx On Abnormal To Free T4    2. Loss of filling from access hole of tooth    3. Screening for malignant neoplasm of colon  -     Ambulatory Referral For Screening Colonoscopy    4. History of esophageal stricture  -     Ambulatory referral for Screening EGD             Follow Up   Return for Annual physical.  Patient was given instructions and counseling regarding his condition or for health maintenance advice. Please see specific information pulled into the AVS if appropriate.     I wore protective equipment  (mask and gloves if needed) throughout this patient encounter. Hand hygiene was performed before and after patient encounter.        Findings discussed.  All questions answered.     Answers for HPI/ROS submitted by the patient on 10/10/2022  What is the primary reason for your visit?: Other  Please describe your symptoms.: I previously had an appointment to correct a problem with a prescription.  The Nurse Practitioner scheduled this appointment.  Have you had these symptoms before?: No  How long have you been having these symptoms?: Greater than 2 weeks  Please describe any probable cause  for these symptoms. : I'm stuck in the system.

## 2022-10-12 LAB
ALBUMIN SERPL-MCNC: 4.7 G/DL (ref 3.8–4.8)
ALBUMIN/GLOB SERPL: 2.4 {RATIO} (ref 1.2–2.2)
ALP SERPL-CCNC: 134 IU/L (ref 44–121)
ALT SERPL-CCNC: 19 IU/L (ref 0–44)
AST SERPL-CCNC: 20 IU/L (ref 0–40)
BASOPHILS # BLD AUTO: 0.1 X10E3/UL (ref 0–0.2)
BASOPHILS NFR BLD AUTO: 2 %
BILIRUB SERPL-MCNC: 0.2 MG/DL (ref 0–1.2)
BUN SERPL-MCNC: 15 MG/DL (ref 8–27)
BUN/CREAT SERPL: 19 (ref 10–24)
CALCIUM SERPL-MCNC: 9.3 MG/DL (ref 8.6–10.2)
CHLORIDE SERPL-SCNC: 92 MMOL/L (ref 96–106)
CHOLEST SERPL-MCNC: 200 MG/DL (ref 100–199)
CO2 SERPL-SCNC: 25 MMOL/L (ref 20–29)
CREAT SERPL-MCNC: 0.77 MG/DL (ref 0.76–1.27)
EGFRCR SERPLBLD CKD-EPI 2021: 98 ML/MIN/1.73
EOSINOPHIL # BLD AUTO: 0.2 X10E3/UL (ref 0–0.4)
EOSINOPHIL NFR BLD AUTO: 4 %
ERYTHROCYTE [DISTWIDTH] IN BLOOD BY AUTOMATED COUNT: 14.3 % (ref 11.6–15.4)
GLOBULIN SER CALC-MCNC: 2 G/DL (ref 1.5–4.5)
GLUCOSE SERPL-MCNC: 101 MG/DL (ref 70–99)
HCT VFR BLD AUTO: 44.6 % (ref 37.5–51)
HDLC SERPL-MCNC: 73 MG/DL
HGB BLD-MCNC: 14.2 G/DL (ref 13–17.7)
IMM GRANULOCYTES # BLD AUTO: 0 X10E3/UL (ref 0–0.1)
IMM GRANULOCYTES NFR BLD AUTO: 0 %
LDLC SERPL CALC-MCNC: 111 MG/DL (ref 0–99)
LYMPHOCYTES # BLD AUTO: 0.3 X10E3/UL (ref 0.7–3.1)
LYMPHOCYTES NFR BLD AUTO: 7 %
MCH RBC QN AUTO: 28.2 PG (ref 26.6–33)
MCHC RBC AUTO-ENTMCNC: 31.8 G/DL (ref 31.5–35.7)
MCV RBC AUTO: 89 FL (ref 79–97)
MONOCYTES # BLD AUTO: 0.6 X10E3/UL (ref 0.1–0.9)
MONOCYTES NFR BLD AUTO: 15 %
NEUTROPHILS # BLD AUTO: 2.9 X10E3/UL (ref 1.4–7)
NEUTROPHILS NFR BLD AUTO: 72 %
PLATELET # BLD AUTO: 435 X10E3/UL (ref 150–450)
POTASSIUM SERPL-SCNC: 4.9 MMOL/L (ref 3.5–5.2)
PROT SERPL-MCNC: 6.7 G/DL (ref 6–8.5)
RBC # BLD AUTO: 5.03 X10E6/UL (ref 4.14–5.8)
SODIUM SERPL-SCNC: 134 MMOL/L (ref 134–144)
TRIGL SERPL-MCNC: 92 MG/DL (ref 0–149)
TSH SERPL DL<=0.005 MIU/L-ACNC: 2.97 UIU/ML (ref 0.45–4.5)
VLDLC SERPL CALC-MCNC: 16 MG/DL (ref 5–40)
WBC # BLD AUTO: 4 X10E3/UL (ref 3.4–10.8)

## 2022-10-13 DIAGNOSIS — R74.8 ELEVATED ALKALINE PHOSPHATASE LEVEL: Primary | ICD-10-CM

## 2022-10-22 LAB — GGT SERPL-CCNC: 22 IU/L (ref 0–65)

## 2023-03-20 ENCOUNTER — OFFICE VISIT (OUTPATIENT)
Dept: FAMILY MEDICINE CLINIC | Facility: CLINIC | Age: 68
End: 2023-03-20
Payer: MEDICARE

## 2023-03-20 VITALS
RESPIRATION RATE: 16 BRPM | WEIGHT: 195 LBS | SYSTOLIC BLOOD PRESSURE: 144 MMHG | HEART RATE: 66 BPM | HEIGHT: 69 IN | OXYGEN SATURATION: 95 % | DIASTOLIC BLOOD PRESSURE: 72 MMHG | BODY MASS INDEX: 28.88 KG/M2 | TEMPERATURE: 97.2 F

## 2023-03-20 DIAGNOSIS — Z12.5 SCREENING FOR PROSTATE CANCER: ICD-10-CM

## 2023-03-20 DIAGNOSIS — L98.9 SKIN LESION OF CHEST WALL: ICD-10-CM

## 2023-03-20 DIAGNOSIS — I10 HYPERTENSION, BENIGN: ICD-10-CM

## 2023-03-20 DIAGNOSIS — R09.89 ABNORMAL LUNG SOUNDS: ICD-10-CM

## 2023-03-20 DIAGNOSIS — E04.9 ENLARGED THYROID: ICD-10-CM

## 2023-03-20 DIAGNOSIS — R74.8 ELEVATED ALKALINE PHOSPHATASE LEVEL: ICD-10-CM

## 2023-03-20 DIAGNOSIS — Z12.11 ENCOUNTER FOR SCREENING COLONOSCOPY: ICD-10-CM

## 2023-03-20 DIAGNOSIS — Z00.00 MEDICARE ANNUAL WELLNESS VISIT, SUBSEQUENT: Primary | ICD-10-CM

## 2023-03-20 DIAGNOSIS — E78.2 MIXED HYPERLIPIDEMIA: ICD-10-CM

## 2023-03-20 PROCEDURE — 3078F DIAST BP <80 MM HG: CPT

## 2023-03-20 PROCEDURE — 3077F SYST BP >= 140 MM HG: CPT

## 2023-03-20 PROCEDURE — 1170F FXNL STATUS ASSESSED: CPT

## 2023-03-20 RX ORDER — HYDROCHLOROTHIAZIDE 12.5 MG/1
25 TABLET ORAL DAILY
Qty: 90 TABLET | Refills: 1 | Status: SHIPPED | OUTPATIENT
Start: 2023-03-20

## 2023-03-20 NOTE — PATIENT INSTRUCTIONS
Make appointment for dermatology evaluation for skin lesions.  [facility], Dr. Sutherland   Have blood work completed.   Obtain PSA test in 4/2023.

## 2023-03-20 NOTE — PROGRESS NOTES
The ABCs of the Annual Wellness Visit  Subsequent Medicare Wellness Visit    Subjective      Daniel Cagle is a 67 y.o. male who presents for a Subsequent Medicare Wellness Visit.  Daniel Cagle is a 67 y.o. male. Presents to [facility] for   Chief Complaint   Patient presents with   • Medicare Wellness-subsequent       Daniel Cagle's last appointment at this office was Visit date not found and last appointment with me was on 10/11/2022.     Rooming Tab(CC,VS,Pt Hx,Fall Screen)    I have reviewed and updated his medications, medical history and problem list during today's office visit.     Patient Care Team:  Chapis Hernandez APRN as PCP - General (Nurse Practitioner)    Problem List Tab  Medications Tab  Synopsis Tab  Chart Review Tab  Care Everywhere Tab  Immunizations Tab  Patient History Tab  This separate E/M service is medically necessary, significant, and separately identifiable.    SUBJECTIVE:    History of Present Illness  Enlarged thyroid  Patient is here for annual Medicare wellness visit.  He feels somewhat well today.  He reports he is recovering from a cold with productive cough, fatigue.  He denies any current concerns today.  He has a history of an elevated alkaline phosphatase level.  At last visit he was given information for colonoscopy screening.  Reminded patient to have testing completed.      Skin lesion -   2 small pencil eraser size lesions noted to upper chest area.  Brown coloration flat irregular shaped.  Dermatology referral placed.    Enlarged thyroid -   Thyroid palpable upon exam.  Enlarged neck girth.  Thyroid ultrasound ordered.  TSH at last visit normal.  Patient denies concerns.             Patient Active Problem List    Diagnosis    • Medicare annual wellness visit, subsequent [Z00.00]    • Loss of filling from access hole of tooth [K08.59]    • History of esophageal stricture [Z87.19]    • Screening for malignant neoplasm of colon  [Z12.11]    • Elevated alkaline phosphatase level [R74.8]    • Ganglion cyst of wrist, right [M67.431]    • Epigastric pain [R10.13]    • Cough [R05.9]    • Medicare annual wellness visit, initial [Z00.00]    • Allergic rhinitis [J30.9]    • Degeneration of lumbar or lumbosacral intervertebral disc [M51.37]    • Hypertension, benign [I10]    • Anemia [D64.9]    • Olecranon bursitis of left elbow [M70.22]    • Hyperlipidemia [E78.5]    • Epididymal cyst [N50.3]    • Erectile dysfunction [N52.9]    • Adjustment disorder [F43.20]    • 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        Medication List:    Current Outpatient Medications:   •  buPROPion XL (WELLBUTRIN XL) 150 MG 24 hr tablet, Take 1 tablet by mouth Daily., Disp: 90 tablet, Rfl: 2  •  citalopram (CeleXA) 40 MG tablet, Take 1 tablet by mouth 2 (Two) Times a Day. (Patient taking differently: Take 1 tablet by mouth 2 (Two) Times a Day. Taking one tablet daily. Breaking it in half to take half in the AM and half in the evening.), Disp: 180 tablet, Rfl: 2  •  hydroCHLOROthiazide (HYDRODIURIL) 12.5 MG tablet, Take 2 tablets by mouth Daily., Disp: 90 tablet, Rfl: 1  •  irbesartan (Avapro) 150 MG tablet, Take 1 tablet by mouth Every Night., Disp: 90 tablet, Rfl: 2  •  loratadine (CLARITIN) 10 MG tablet, Take 1 tablet by mouth Daily., Disp: 90 tablet, Rfl: 3  •  simvastatin (ZOCOR) 40 MG tablet, Take 1 tablet by mouth Daily., Disp: 90 tablet, Rfl: 2    Review of Systems   Constitutional: Negative for chills, fatigue and fever.   Respiratory: Positive for cough (productive. clear phlegm). Negative for shortness of breath and wheezing.    Cardiovascular: Negative for chest pain, palpitations and leg swelling.   Gastrointestinal: Negative for abdominal  "distention, abdominal pain, anal bleeding, blood in stool, constipation, diarrhea, nausea, vomiting and GERD.   Endocrine: Positive for cold intolerance.   Skin: Positive for skin lesions.        Skin becomes reddened and itchy in cool weather.    Neurological: Negative for dizziness, light-headedness, headache and memory problem.   Hematological: Does not bruise/bleed easily.       Past Medical History:   Diagnosis Date   • Adult situational stress disorder    • Allergic rhinitis    • Anemia    • Congenital hiatus hernia    • Diverticulosis    • Dysphagia    • ED (erectile dysfunction)    • Hyperlipidemia    • Hypertension    • Olecranon bursitis        No past medical history pertinent negatives.    Past Surgical History:   Procedure Laterality Date   • HERNIA REPAIR         Social History     Socioeconomic History   • Marital status:    Tobacco Use   • Smoking status: Former     Packs/day: 1.00     Years: 5.00     Pack years: 5.00     Types: Cigarettes     Start date:      Quit date:      Years since quittin.2   • Smokeless tobacco: Never   Vaping Use   • Vaping Use: Never used   Substance and Sexual Activity   • Alcohol use: No   • Drug use: No   • Sexual activity: Defer       Family History   Problem Relation Age of Onset   • Stroke Father        OBJECTIVE:    Vitals:    23 0831   BP: 144/72   BP Location: Left arm   Patient Position: Sitting   Cuff Size: Large Adult   Pulse: 66   Resp: 16   Temp: 97.2 °F (36.2 °C)   TempSrc: Infrared   SpO2: 95%   Weight: 88.5 kg (195 lb)   Height: 175.3 cm (69\")   PainSc: 0-No pain       Estimated body mass index is 28.8 kg/m² as calculated from the following:    Height as of this encounter: 175.3 cm (69\").    Weight as of this encounter: 88.5 kg (195 lb).    Physical Exam  Vitals and nursing note reviewed.   Constitutional:       General: He is not in acute distress.     Appearance: Normal appearance. He is well-groomed. He is not ill-appearing. "   HENT:      Head: Normocephalic and atraumatic.   Neck:      Thyroid: Thyromegaly present. No thyroid tenderness.      Vascular: No carotid bruit.   Cardiovascular:      Rate and Rhythm: Normal rate and regular rhythm.      Pulses: Normal pulses.      Heart sounds: Normal heart sounds. No murmur heard.  Pulmonary:      Effort: Pulmonary effort is normal.      Breath sounds: Normal breath sounds and air entry.   Musculoskeletal:      Cervical back: No tenderness.      Right lower leg: No edema.      Left lower leg: No edema.   Lymphadenopathy:      Cervical: No cervical adenopathy.   Skin:     General: Skin is warm and dry.      Capillary Refill: Capillary refill takes less than 2 seconds.          Neurological:      Mental Status: He is alert and oriented to person, place, and time. Mental status is at baseline.   Psychiatric:         Attention and Perception: Attention normal.         Mood and Affect: Mood normal.         Speech: Speech normal.         Behavior: Behavior normal. Behavior is cooperative.         Result Review :   The following data was reviewed by: THU Mccarthy on 03/20/2023:      CMP    CMP 4/21/22 4/28/22 10/11/22   Glucose 96 101 (A) 101 (A)   BUN 14 11 15   Creatinine 0.84 0.83 0.77   Sodium 133 (A) 137 134   Potassium 4.0 4.6 4.9   Chloride 93 (A) 97 92 (A)   Calcium 9.1 9.4 9.3   Total Protein 6.3 6.2 6.7   Albumin 4.5 4.3 4.7   Globulin 1.8 1.9 2.0   Total Bilirubin 0.4 0.3 0.2   Alkaline Phosphatase 118 126 (A) 134 (A)   AST (SGOT) 16 16 20   ALT (SGPT) 15 14 19   BUN/Creatinine Ratio 17 13 19   (A) Abnormal value       Comments are available for some flowsheets but are not being displayed.           CBC w/diff    CBC w/Diff 4/21/22 4/28/22 10/11/22   WBC 5.0 4.2 4.0   RBC 4.74 4.70 5.03   Hemoglobin 13.9 13.7 14.2   Hematocrit 42.2 43.1 44.6   MCV 89 92 89   MCH 29.3 29.1 28.2   MCHC 32.9 31.8 31.8   RDW 13.8 14.1 14.3   Platelets 393 411 435   Neutrophil Rel % 82 80 72    Lymphocyte Rel % 4 8 7   Monocyte Rel % 11 7 15   Eosinophil Rel % 1 3 4   Basophil Rel % 1 1 2           Lipid Panel    Lipid Panel 4/28/22 10/11/22   Total Cholesterol 159 200 (A)   Triglycerides 64 92   HDL Cholesterol 69 73   VLDL Cholesterol 13 16   LDL Cholesterol  77 111 (A)   (A) Abnormal value            TSH    TSH 10/11/22   TSH 2.970                   UA    Urinalysis 9/27/22   Ketones, UA Negative   Leukocytes, UA Negative           PSA    PSA 4/28/22   PSA 3.0      Comments are available for some flowsheets but are not being displayed.                      PHQ-9 Total Score: 0            ASSESSMENT/ PLAN:    Statin Choice Calculator    Data Reviewed:   Assessment and Plan      Diagnoses and all orders for this visit:    1. Medicare annual wellness visit, subsequent (Primary)    2. Skin lesion of chest wall  Comments:  two brown colored pencil eraser sized lesions.  Dermatology referral placed  Orders:  -     Ambulatory Referral to Dermatology    3. Encounter for screening colonoscopy  Comments:  Green packet previously given.  Patient advised to get scope completed.     4. Enlarged thyroid  Comments:  Thyroid ultrasound ordered.  Orders:  -     Cancel: US Thyroid  -     US Thyroid    5. Abnormal lung sounds  Comments:  Left lower left mid lung sounds abnormal.  Chest x-ray ordered.  Patient recovering from recent illness.  Orders:  -     XR Chest PA & Lateral    6. Screening for prostate cancer  Comments:  PSA due April 2023.  Orders:  -     PSA SCREENING; Future    7. Persistently elevated.  Elevated alkaline phosphatase level  Comments:  Repeat blood work ordered.  Persistently elevated.  Orders:  -     CBC w AUTO Differential  -     Comprehensive metabolic panel    8. Mixed hyperlipidemia  Comments:  Repeat lipid panel ordered.  Orders:  -     Cancel: Lipid panel; Future  -     Lipid panel    9. Hypertension, benign  Comments:  Controlled with medication.  Home BP ranges 130s over  70-80.  Orders:  -     hydroCHLOROthiazide (HYDRODIURIL) 12.5 MG tablet; Take 2 tablets by mouth Daily.  Dispense: 90 tablet; Refill: 1         Medicare annual wellness visit, subsequent [Z00.00]             Wrapup Tab  Follow Up   Requested Prescriptions     Signed Prescriptions Disp Refills   • hydroCHLOROthiazide (HYDRODIURIL) 12.5 MG tablet 90 tablet 1     Sig: Take 2 tablets by mouth Daily.     Medications Discontinued During This Encounter   Medication Reason   • predniSONE (DELTASONE) 20 MG tablet *Therapy completed   • hydroCHLOROthiazide (HYDRODIURIL) 12.5 MG tablet Reorder     Return in about 6 months (around 9/20/2023) for Recheck. Next appointment with this provider is Visit date not found.      Patient was given instructions and counseling regarding his condition or for health maintenance advice. Please see specific information pulled into the AVS if appropriate.    Patient Instructions   Make appointment for dermatology evaluation for skin lesions.  [facility], Dr. Sutherland   Have blood work completed.   Obtain PSA test in 4/2023.            I wore protective equipment throughout this patient encounter to include mask. Hand hygiene was performed during entrance to exam room and following assessment of patient.     This document is intended for medical expert use only. Reading of this document by patients and/or patient's family without participating medical staff guidance may result in misinterpretation and unintended morbidity. Any interpretation of such data is the responsibility of the patient and/or family member responsible for the patient in concert with their primary or specialist providers, not to be left for sources of online searches such as [employer] [place], [employer] or similar queries. Relying on these approaches to knowledge may result in misinterpretation, misguided goals of care and even death should patients or family members try recommendations outside of the realm of professional  medical care.    The following portions of the patient's history were reviewed and   updated as appropriate: allergies, current medications, past family history, past medical history, past social history, past surgical history and problem list.    Compared to one year ago, the patient feels his physical   health is the same.    Compared to one year ago, the patient feels his mental   health is the same.    Recent Hospitalizations:  He was not admitted to the hospital during the last year.       Current Medical Providers:  Patient Care Team:  Chapis Hernandez APRN as PCP - General (Nurse Practitioner)    Outpatient Medications Prior to Visit   Medication Sig Dispense Refill   • buPROPion XL (WELLBUTRIN XL) 150 MG 24 hr tablet Take 1 tablet by mouth Daily. 90 tablet 2   • citalopram (CeleXA) 40 MG tablet Take 1 tablet by mouth 2 (Two) Times a Day. (Patient taking differently: Take 1 tablet by mouth 2 (Two) Times a Day. Taking one tablet daily. Breaking it in half to take half in the AM and half in the evening.) 180 tablet 2   • irbesartan (Avapro) 150 MG tablet Take 1 tablet by mouth Every Night. 90 tablet 2   • loratadine (CLARITIN) 10 MG tablet Take 1 tablet by mouth Daily. 90 tablet 3   • simvastatin (ZOCOR) 40 MG tablet Take 1 tablet by mouth Daily. 90 tablet 2   • hydroCHLOROthiazide (HYDRODIURIL) 12.5 MG tablet Take 2 tablets by mouth Daily for 90 days. 60 tablet 1   • predniSONE (DELTASONE) 20 MG tablet Take 1 tablet by mouth 2 (Two) Times a Day. 28 tablet 0     No facility-administered medications prior to visit.       No opioid medication identified on active medication list. I have reviewed chart for other potential  high risk medication/s and harmful drug interactions in the elderly.          Aspirin is not on active medication list.  Aspirin use is not indicated based on review of current medical condition/s. Risk of harm outweighs potential benefits.  .    Patient Active Problem List   Diagnosis   •  "Adjustment disorder   • Allergic rhinitis   • Anemia   • Benign neoplasm of rectum and anal canal   • Congenital hiatus hernia   • Degeneration of lumbar or lumbosacral intervertebral disc   • Diverticulosis of colon   • Dysphagia   • Epididymal cyst   • Erectile dysfunction   • Hyperlipidemia   • Hypertension, benign   • Olecranon bursitis of left elbow   • Medicare annual wellness visit, initial   • Cough   • Epigastric pain   • Elevated alkaline phosphatase level   • Ganglion cyst of wrist, right   • Loss of filling from access hole of tooth   • History of esophageal stricture   • Screening for malignant neoplasm of colon   • Medicare annual wellness visit, subsequent     Advance Care Planning  Advance Directive is not on file.  ACP discussion was held with the patient during this visit. Patient has an advance directive (not in EMR), copy requested.     Objective    Vitals:    23 0831   BP: 144/72   BP Location: Left arm   Patient Position: Sitting   Cuff Size: Large Adult   Pulse: 66   Resp: 16   Temp: 97.2 °F (36.2 °C)   TempSrc: Infrared   SpO2: 95%   Weight: 88.5 kg (195 lb)   Height: 175.3 cm (69\")   PainSc: 0-No pain     Estimated body mass index is 28.8 kg/m² as calculated from the following:    Height as of this encounter: 175.3 cm (69\").    Weight as of this encounter: 88.5 kg (195 lb).    BMI is >= 25 and <30. (Overweight) The following options were offered after discussion;: exercise counseling/recommendations and nutrition counseling/recommendations      Does the patient have evidence of cognitive impairment?   No            HEALTH RISK ASSESSMENT    Smoking Status:  Social History     Tobacco Use   Smoking Status Former   • Packs/day: 1.00   • Years: 5.00   • Pack years: 5.00   • Types: Cigarettes   • Start date:    • Quit date:    • Years since quittin.2   Smokeless Tobacco Never     Alcohol Consumption:  Social History     Substance and Sexual Activity   Alcohol Use No     Fall " Risk Screen:    STEADI Fall Risk Assessment was completed, and patient is at LOW risk for falls.Assessment completed on:3/20/2023    Depression Screening:  PHQ-2/PHQ-9 Depression Screening 3/20/2023   Little Interest or Pleasure in Doing Things 0-->not at all   Feeling Down, Depressed or Hopeless 0-->not at all   PHQ-9: Brief Depression Severity Measure Score 0       Health Habits and Functional and Cognitive Screening:  Functional & Cognitive Status 3/20/2023   Do you have difficulty preparing food and eating? No   Do you have difficulty bathing yourself, getting dressed or grooming yourself? No   Do you have difficulty using the toilet? No   Do you have difficulty moving around from place to place? No   Do you have trouble with steps or getting out of a bed or a chair? No   Current Diet Well Balanced Diet   Dental Exam Up to date   Eye Exam Up to date   Exercise (times per week) 0 times per week   Current Exercises Include No Regular Exercise   Current Exercise Activities Include -   Do you need help using the phone?  No   Are you deaf or do you have serious difficulty hearing?  No   Do you need help with transportation? No   Do you need help shopping? No   Do you need help preparing meals?  No   Do you need help with housework?  No   Do you need help with laundry? No   Do you need help taking your medications? No   Do you need help managing money? No   Do you ever drive or ride in a car without wearing a seat belt? No   Have you felt unusual stress, anger or loneliness in the last month? Yes   Who do you live with? Alone   If you need help, do you have trouble finding someone available to you? No   Have you been bothered in the last four weeks by sexual problems? -   Do you have difficulty concentrating, remembering or making decisions? No       Age-appropriate Screening Schedule:  Refer to the list below for future screening recommendations based on patient's age, sex and/or medical conditions. Orders for these  recommended tests are listed in the plan section. The patient has been provided with a written plan.    Health Maintenance   Topic Date Due   • ZOSTER VACCINE (1 of 2) Never done   • HEPATITIS C SCREENING  Never done   • COVID-19 Vaccine (4 - Booster for Moderna series) 03/22/2023 (Originally 2/3/2022)   • INFLUENZA VACCINE  03/31/2023 (Originally 8/1/2022)   • Pneumococcal Vaccine 65+ (1 - PCV) 03/20/2024 (Originally 5/12/2020)   • LIPID PANEL  10/11/2023   • ANNUAL WELLNESS VISIT  03/20/2024   • COLORECTAL CANCER SCREENING  10/30/2027   • TDAP/TD VACCINES (2 - Td or Tdap) 12/07/2030   • AAA SCREEN (ONE-TIME)  Completed                CMS Preventative Services Quick Reference  Risk Factors Identified During Encounter:    None Identified    The above risks/problems have been discussed with the patient.  Pertinent information has been shared with the patient in the After Visit Summary.    Diagnoses and all orders for this visit:    1. Medicare annual wellness visit, subsequent (Primary)    2. Skin lesion of chest wall  Comments:  two brown colored pencil eraser sized lesions.  Dermatology referral placed  Orders:  -     Ambulatory Referral to Dermatology    3. Encounter for screening colonoscopy  Comments:  Green packet previously given.  Patient advised to get scope completed.     4. Enlarged thyroid  Comments:  Thyroid ultrasound ordered.  Orders:  -     Cancel: US Thyroid  -     US Thyroid    5. Abnormal lung sounds  Comments:  Left lower left mid lung sounds abnormal.  Chest x-ray ordered.  Patient recovering from recent illness.  Orders:  -     XR Chest PA & Lateral    6. Screening for prostate cancer  Comments:  PSA due April 2023.  Orders:  -     PSA SCREENING; Future    7. Persistently elevated.  Elevated alkaline phosphatase level  Comments:  Repeat blood work ordered.  Persistently elevated.  Orders:  -     CBC w AUTO Differential  -     Comprehensive metabolic panel    8. Mixed  hyperlipidemia  Comments:  Repeat lipid panel ordered.  Orders:  -     Cancel: Lipid panel; Future  -     Lipid panel    9. Hypertension, benign  Comments:  Controlled with medication.  Home BP ranges 130s over 70-80.  Orders:  -     hydroCHLOROthiazide (HYDRODIURIL) 12.5 MG tablet; Take 2 tablets by mouth Daily.  Dispense: 90 tablet; Refill: 1        Follow Up:   Next Medicare Wellness visit to be scheduled in 1 year.      An After Visit Summary and PPPS were made available to the patient.

## 2023-03-21 LAB
ALBUMIN SERPL-MCNC: 4.5 G/DL (ref 3.8–4.8)
ALBUMIN/GLOB SERPL: 2 {RATIO} (ref 1.2–2.2)
ALP SERPL-CCNC: 125 IU/L (ref 44–121)
ALT SERPL-CCNC: 31 IU/L (ref 0–44)
AST SERPL-CCNC: 33 IU/L (ref 0–40)
BASOPHILS # BLD AUTO: 0 X10E3/UL (ref 0–0.2)
BASOPHILS NFR BLD AUTO: 1 %
BILIRUB SERPL-MCNC: <0.2 MG/DL (ref 0–1.2)
BUN SERPL-MCNC: 9 MG/DL (ref 8–27)
BUN/CREAT SERPL: 12 (ref 10–24)
CALCIUM SERPL-MCNC: 9.2 MG/DL (ref 8.6–10.2)
CHLORIDE SERPL-SCNC: 97 MMOL/L (ref 96–106)
CHOLEST SERPL-MCNC: 165 MG/DL (ref 100–199)
CO2 SERPL-SCNC: 29 MMOL/L (ref 20–29)
CREAT SERPL-MCNC: 0.78 MG/DL (ref 0.76–1.27)
EGFRCR SERPLBLD CKD-EPI 2021: 98 ML/MIN/1.73
EOSINOPHIL # BLD AUTO: 0.2 X10E3/UL (ref 0–0.4)
EOSINOPHIL NFR BLD AUTO: 6 %
ERYTHROCYTE [DISTWIDTH] IN BLOOD BY AUTOMATED COUNT: 14.1 % (ref 11.6–15.4)
GLOBULIN SER CALC-MCNC: 2.2 G/DL (ref 1.5–4.5)
GLUCOSE SERPL-MCNC: 100 MG/DL (ref 70–99)
HCT VFR BLD AUTO: 41.5 % (ref 37.5–51)
HDLC SERPL-MCNC: 46 MG/DL
HGB BLD-MCNC: 13.6 G/DL (ref 13–17.7)
IMM GRANULOCYTES # BLD AUTO: 0 X10E3/UL (ref 0–0.1)
IMM GRANULOCYTES NFR BLD AUTO: 0 %
LDLC SERPL CALC-MCNC: 102 MG/DL (ref 0–99)
LYMPHOCYTES # BLD AUTO: 0.5 X10E3/UL (ref 0.7–3.1)
LYMPHOCYTES NFR BLD AUTO: 16 %
MCH RBC QN AUTO: 27.3 PG (ref 26.6–33)
MCHC RBC AUTO-ENTMCNC: 32.8 G/DL (ref 31.5–35.7)
MCV RBC AUTO: 83 FL (ref 79–97)
MONOCYTES # BLD AUTO: 0.4 X10E3/UL (ref 0.1–0.9)
MONOCYTES NFR BLD AUTO: 14 %
NEUTROPHILS # BLD AUTO: 1.8 X10E3/UL (ref 1.4–7)
NEUTROPHILS NFR BLD AUTO: 63 %
PLATELET # BLD AUTO: 404 X10E3/UL (ref 150–450)
POTASSIUM SERPL-SCNC: 4.4 MMOL/L (ref 3.5–5.2)
PROT SERPL-MCNC: 6.7 G/DL (ref 6–8.5)
RBC # BLD AUTO: 4.99 X10E6/UL (ref 4.14–5.8)
SODIUM SERPL-SCNC: 137 MMOL/L (ref 134–144)
TRIGL SERPL-MCNC: 90 MG/DL (ref 0–149)
VLDLC SERPL CALC-MCNC: 17 MG/DL (ref 5–40)
WBC # BLD AUTO: 2.9 X10E3/UL (ref 3.4–10.8)

## 2023-04-16 DIAGNOSIS — D72.819 LEUKOPENIA, UNSPECIFIED TYPE: Primary | ICD-10-CM

## 2023-04-16 DIAGNOSIS — R74.8 ELEVATED ALKALINE PHOSPHATASE LEVEL: ICD-10-CM

## 2023-04-17 ENCOUNTER — TELEPHONE (OUTPATIENT)
Dept: FAMILY MEDICINE CLINIC | Facility: CLINIC | Age: 68
End: 2023-04-17
Payer: MEDICARE

## 2023-04-17 NOTE — TELEPHONE ENCOUNTER
Caller: Daniel Cagle    Relationship to patient: Self    Best call back number: [phone]    Chief complaint: NO ENERGY    Type of visit: NEW PATIENT    Requested date: AS SOON AS POSSIBLE    NOTES-PATIENT WAS A PATIENT OF , BUT PATIENT WOULD LIKE TO SWITCH BACK TO  [facility] THAN GOING BACK TO HIS PCP

## 2023-04-18 ENCOUNTER — TELEPHONE (OUTPATIENT)
Dept: FAMILY MEDICINE CLINIC | Facility: CLINIC | Age: 68
End: 2023-04-18
Payer: MEDICARE

## 2023-04-18 NOTE — TELEPHONE ENCOUNTER
Caller: Daniel Cagle    Relationship: Self    Best call back number: [phone]    What was the call regarding: PATIENT REQUESTING IF DR. GUTIERREZ WILL ASSUME PATIENT.  PATIENT HAS KNOWN DR. GUTIERREZ FOR MANY YEARS.  PLEASE SUBMIT THIS DIRECTLY TO DR. GUTIERREZ.      Do you require a callback: YES

## 2023-04-18 NOTE — TELEPHONE ENCOUNTER
Called patient and let him know Dr. Lala was not taking new patients.  Dr. Lala will only see him for the Flight exam.  Offered to schedule an appointment with Dr. Ziegler-patient declines.

## 2023-04-24 NOTE — TELEPHONE ENCOUNTER
Called patient and advised him that Dr. 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

## 2023-04-27 RX ORDER — LORATADINE 10 MG/1
10 TABLET ORAL DAILY
Qty: 90 TABLET | Refills: 3 | Status: SHIPPED | OUTPATIENT
Start: 2023-04-27

## 2023-04-28 ENCOUNTER — HOSPITAL ENCOUNTER (OUTPATIENT)
Dept: GENERAL RADIOLOGY | Facility: HOSPITAL | Age: 68
Discharge: HOME | End: 2023-04-28
Payer: MEDICARE

## 2023-04-28 ENCOUNTER — OFFICE VISIT (OUTPATIENT)
Dept: FAMILY MEDICINE CLINIC | Facility: CLINIC | Age: 68
End: 2023-04-28
Payer: MEDICARE

## 2023-04-28 VITALS
TEMPERATURE: 97.8 F | WEIGHT: 199.8 LBS | SYSTOLIC BLOOD PRESSURE: 136 MMHG | DIASTOLIC BLOOD PRESSURE: 88 MMHG | BODY MASS INDEX: 29.59 KG/M2 | HEART RATE: 64 BPM | HEIGHT: 69 IN | RESPIRATION RATE: 20 BRPM | OXYGEN SATURATION: 94 %

## 2023-04-28 DIAGNOSIS — M54.12 CERVICAL RADICULOPATHY: Primary | ICD-10-CM

## 2023-04-28 DIAGNOSIS — M25.511 ACUTE PAIN OF RIGHT SHOULDER: ICD-10-CM

## 2023-04-28 PROCEDURE — 72040 X-RAY EXAM NECK SPINE 2-3 VW: CPT

## 2023-04-28 PROCEDURE — 73030 X-RAY EXAM OF SHOULDER: CPT

## 2023-04-28 RX ORDER — KETOROLAC TROMETHAMINE 30 MG/ML
30 INJECTION, SOLUTION INTRAMUSCULAR; INTRAVENOUS ONCE
Status: COMPLETED | OUTPATIENT
Start: 2023-04-28 | End: 2023-04-28

## 2023-04-28 RX ORDER — METHYLPREDNISOLONE 4 MG/1
TABLET ORAL
Qty: 21 TABLET | Refills: 0 | Status: SHIPPED | OUTPATIENT
Start: 2023-04-28

## 2023-04-28 RX ORDER — ETODOLAC 500 MG/1
500 TABLET, FILM COATED ORAL EVERY 12 HOURS
Qty: 60 TABLET | Refills: 0 | Status: SHIPPED | OUTPATIENT
Start: 2023-04-28

## 2023-04-28 RX ORDER — IBUPROFEN 600 MG/1
600 TABLET ORAL EVERY 6 HOURS PRN
COMMUNITY

## 2023-04-28 RX ADMIN — KETOROLAC TROMETHAMINE 30 MG: 30 INJECTION, SOLUTION INTRAMUSCULAR; INTRAVENOUS at 09:46

## 2023-04-28 RX ADMIN — KETOROLAC TROMETHAMINE 30 MG: 30 INJECTION, SOLUTION INTRAMUSCULAR; INTRAVENOUS at 09:44
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
## 2023-05-02 ENCOUNTER — OFFICE VISIT (OUTPATIENT)
Dept: FAMILY MEDICINE CLINIC | Facility: CLINIC | Age: 68
End: 2023-05-02
Payer: MEDICARE

## 2023-05-02 VITALS
RESPIRATION RATE: 16 BRPM | OXYGEN SATURATION: 96 % | WEIGHT: 198 LBS | DIASTOLIC BLOOD PRESSURE: 82 MMHG | TEMPERATURE: 97.6 F | BODY MASS INDEX: 28.35 KG/M2 | HEIGHT: 70 IN | SYSTOLIC BLOOD PRESSURE: 143 MMHG | HEART RATE: 67 BPM

## 2023-05-02 DIAGNOSIS — M54.42 CHRONIC BILATERAL LOW BACK PAIN WITH BILATERAL SCIATICA: ICD-10-CM

## 2023-05-02 DIAGNOSIS — M54.41 CHRONIC BILATERAL LOW BACK PAIN WITH BILATERAL SCIATICA: ICD-10-CM

## 2023-05-02 DIAGNOSIS — M54.9 UPPER BACK PAIN: ICD-10-CM

## 2023-05-02 DIAGNOSIS — M25.511 ACUTE PAIN OF RIGHT SHOULDER: Primary | ICD-10-CM

## 2023-05-02 DIAGNOSIS — G89.29 CHRONIC BILATERAL LOW BACK PAIN WITH BILATERAL SCIATICA: ICD-10-CM

## 2023-05-02 RX ORDER — METHOCARBAMOL 500 MG/1
500 TABLET, FILM COATED ORAL 4 TIMES DAILY
Qty: 30 TABLET | Refills: 0 | Status: SHIPPED | OUTPATIENT
Start: 2023-05-02

## 2023-05-02 NOTE — PATIENT INSTRUCTIONS
[facility] - [phone]  [address], [place] [address]    Follow up if symptoms do not improve or worsen.     Use heat or ice as tolerated for symptom control.

## 2023-05-02 NOTE — PROGRESS NOTES
Daniel Cagle is a 67 y.o. male. Presents to [facility] for   Chief Complaint   Patient presents with   • Back Pain       Rooming Tab(CC,VS,Pt Hx,Fall Screen)    SUBJECTIVE:    Back Pain  This is a recurrent problem. The current episode started more than 1 year ago. The problem occurs intermittently. The problem has been gradually worsening since onset. The pain is present in the lumbar spine. The quality of the pain is described as burning. The pain does not radiate. The pain is at a severity of 6/10. The pain is moderate. The pain is the same all the time. The symptoms are aggravated by standing, twisting, lying down and bending. Stiffness is present all day. Associated symptoms include weakness. Pertinent negatives include no abdominal pain, bladder incontinence, bowel incontinence, chest pain, dysuria, fever, headaches, leg pain, numbness, paresis, paresthesias, pelvic pain, perianal numbness, tingling or weight loss. The treatment provided no relief.      Old injury to right shoulder/right back. Came in a month ago and reports right shoulder pain and was seen here a month ago.   He is here with his medications that report he is making him nauseous.  He has a place on his shoulder blade and posterior and anterior with a burning sensation that crosses his black to the left.      He reports his lower leg back and posterior sciatica bilaterally and reports shooting pain down the back of both legs.    He reports relief with walking and is walking late at night.      He has brought in medication and reports no relief and states he has nausea associated with the medicine.      He was recently prescribed Etodolac by another provider and reports he is sensitive to the medication and requests a different medication.    He has previously been treated by the [facility] - Dr. Putnam.  He defers referral at this time.       Patient Active Problem List    Diagnosis     • Medicare annual wellness visit, subsequent [Z00.00]    • Loss of filling from access hole of tooth [K08.59]    • History of esophageal stricture [Z87.19]    • Screening for malignant neoplasm of colon [Z12.11]    • Elevated alkaline phosphatase level [R74.8]    • Ganglion cyst of wrist, right [M67.431]    • Epigastric pain [R10.13]    • Cough [R05.9]    • Medicare annual wellness visit, initial [Z00.00]    • Allergic rhinitis [J30.9]    • Degeneration of lumbar or lumbosacral intervertebral disc [M51.37]    • Hypertension, benign [I10]    • Anemia [D64.9]    • Olecranon bursitis of left elbow [M70.22]    • Hyperlipidemia [E78.5]    • Epididymal cyst [N50.3]    • Erectile dysfunction [N52.9]    • Adjustment disorder [F43.20]    • 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        Medication List:  Current Outpatient Medications on File Prior to Visit   Medication Sig Dispense Refill   • citalopram (CeleXA) 40 MG tablet Take 1 tablet by mouth 2 (Two) Times a Day. (Patient taking differently: Take 1 tablet by mouth 2 (Two) Times a Day. Taking one tablet daily. Breaking it in half to take half in the AM and half in the evening.) 180 tablet 2   • etodolac (LODINE) 500 MG tablet Take 1 tablet by mouth Every 12 (Twelve) Hours. Take with food 60 tablet 0   • hydroCHLOROthiazide (HYDRODIURIL) 12.5 MG tablet Take 2 tablets by mouth Daily. 90 tablet 1   • ibuprofen (ADVIL,MOTRIN) 600 MG tablet Take 1 tablet by mouth Every 6 (Six) Hours As Needed for Mild Pain.     • irbesartan (Avapro) 150 MG tablet Take 1 tablet by mouth Every Night. 90 tablet 2   • loratadine (CLARITIN) 10 MG tablet Take 1 tablet by mouth Daily. 90 tablet 3   • methylPREDNISolone (MEDROL) 4 MG dose pack Take as directed on package  instructions. 21 tablet 0   • simvastatin (ZOCOR) 40 MG tablet Take 1 tablet by mouth Daily. 90 tablet 2     No current facility-administered medications on file prior to visit.     Current outpatient and discharge medications have been reconciled for the patient.  Reviewed by: THU Mccarthy      Review of Systems   Constitutional: Negative for fever and unexpected weight loss.   Cardiovascular: Negative for chest pain.   Gastrointestinal: Negative for abdominal pain, bowel incontinence, constipation and diarrhea.   Genitourinary: Negative for difficulty urinating, dysuria, pelvic pain and urinary incontinence.   Musculoskeletal: Positive for back pain and myalgias. Negative for gait problem, joint swelling, neck pain and neck stiffness.   Neurological: Positive for weakness. Negative for tingling, tremors, light-headedness, numbness, headache and paresthesias.       Past Medical History:   Diagnosis Date   • Adult situational stress disorder    • Allergic rhinitis    • Anemia    • Congenital hiatus hernia    • Diverticulosis    • Dysphagia    • ED (erectile dysfunction)    • Hyperlipidemia    • Hypertension    • Olecranon bursitis        No past medical history pertinent negatives.    Past Surgical History:   Procedure Laterality Date   • COLONOSCOPY  10/30/2017    Dr. Siddiqui   • COLONOSCOPY  2000    adenoma polyp; Dr. Whiet   • COLONOSCOPY  2008    Dr. Marin; hyperplastic polyp   • HERNIA REPAIR         Social History     Socioeconomic History   • Marital status:    Tobacco Use   • Smoking status: Former     Packs/day: 1.00     Years: 5.00     Pack years: 5.00     Types: Cigarettes     Start date:      Quit date:      Years since quittin.3   • Smokeless tobacco: Never   Vaping Use   • Vaping Use: Never used   Substance and Sexual Activity   • Alcohol use: No   • Drug use: No   • Sexual activity: Defer       Family History   Problem Relation Age of Onset   • Stroke Father   "      OBJECTIVE:    Vitals:    05/02/23 1526   BP: 143/82   BP Location: Left arm   Patient Position: Sitting   Cuff Size: Large Adult   Pulse: 67   Resp: 16   Temp: 97.6 °F (36.4 °C)   TempSrc: Infrared   SpO2: 96%   Weight: 89.8 kg (198 lb)   Height: 177.8 cm (70\")       Estimated body mass index is 28.41 kg/m² as calculated from the following:    Height as of this encounter: 177.8 cm (70\").    Weight as of this encounter: 89.8 kg (198 lb).   **  Physical Exam  Vitals and nursing note reviewed.   Constitutional:       General: He is not in acute distress.     Appearance: Normal appearance. He is normal weight. He is not ill-appearing, toxic-appearing or diaphoretic.   HENT:      Head: Normocephalic and atraumatic.   Neck:      Vascular: No carotid bruit.   Cardiovascular:      Rate and Rhythm: Normal rate and regular rhythm.      Heart sounds: Normal heart sounds.   Pulmonary:      Effort: Pulmonary effort is normal.      Breath sounds: Normal breath sounds.   Musculoskeletal:      Right shoulder: Tenderness and bony tenderness present. No laceration or crepitus. Normal range of motion. Decreased strength. Normal pulse.      Left shoulder: Normal.        Arms:       Cervical back: Normal range of motion.      Lumbar back: Tenderness present. Positive right straight leg raise test. Negative left straight leg raise test.        Back:    Lymphadenopathy:      Cervical: No cervical adenopathy.   Skin:     General: Skin is warm and dry.   Neurological:      Mental Status: He is alert and oriented to person, place, and time. Mental status is at baseline.   Psychiatric:         Mood and Affect: Mood normal.         Behavior: Behavior normal.       Physical Exam   Back       Upper extremities           Result Review :     Lab Results   Component Value Date    BUN 9 03/20/2023    CREATININE 0.78 03/20/2023     03/20/2023    K 4.4 03/20/2023    CL 97 03/20/2023    CALCIUM 9.2 03/20/2023    ALBUMIN 4.5 03/20/2023    " BILITOT <0.2 03/20/2023    ALKPHOS 125 (H) 03/20/2023    AST 33 03/20/2023    ALT 31 03/20/2023    CHLPL 165 03/20/2023    TRIG 90 03/20/2023    HDL 46 03/20/2023    VLDL 17 03/20/2023     (H) 03/20/2023    WBC 2.9 (L) 03/20/2023    RBC 4.99 03/20/2023    HCT 41.5 03/20/2023    MCV 83 03/20/2023    MCH 27.3 03/20/2023                 PHQ-9 Total Score:             ASSESSMENT/ PLAN:    Data Reviewed:  XR Spine Cervical 2 or 3 View    Result Date: 4/30/2023  Impression: Impression: Unremarkable cervical spine Electronically Signed: Sunil Hewitt  4/30/2023 12:39 PM EDT  Workstation ID: [id number]    XR Shoulder 2+ View Right    Result Date: 4/30/2023  Impression: Impression: No acute osseous abnormality. Electronically Signed: Sunil Hewitt  4/30/2023 12:39 PM EDT  Workstation ID: [id number]  Assessment and Plan      Diagnoses and all orders for this visit:    1. Acute pain of right shoulder (Primary)  -     Cancel: MRI Shoulder Right Without Contrast  -     traMADol-acetaminophen (Ultracet) 37.5-325 MG per tablet; Take 1 tablet by mouth Every 8 (Eight) Hours As Needed for Moderate Pain.  Dispense: 21 tablet; Refill: 0  -     MRI Shoulder Right Without Contrast; Future    2. Upper back pain  -     methocarbamol (ROBAXIN) 500 MG tablet; Take 1 tablet by mouth 4 (Four) Times a Day.  Dispense: 30 tablet; Refill: 0  -     traMADol-acetaminophen (Ultracet) 37.5-325 MG per tablet; Take 1 tablet by mouth Every 8 (Eight) Hours As Needed for Moderate Pain.  Dispense: 21 tablet; Refill: 0    3. Chronic bilateral low back pain with bilateral sciatica  -     methocarbamol (ROBAXIN) 500 MG tablet; Take 1 tablet by mouth 4 (Four) Times a Day.  Dispense: 30 tablet; Refill: 0  -     traMADol-acetaminophen (Ultracet) 37.5-325 MG per tablet; Take 1 tablet by mouth Every 8 (Eight) Hours As Needed for Moderate Pain.  Dispense: 21 tablet; Refill: 0         Acute pain of right shoulder [M25.511]             Wrapup Tab  Follow Up   Requested  Prescriptions     Signed Prescriptions Disp Refills   • methocarbamol (ROBAXIN) 500 MG tablet 30 tablet 0     Sig: Take 1 tablet by mouth 4 (Four) Times a Day.   • traMADol-acetaminophen (Ultracet) 37.5-325 MG per tablet 21 tablet 0     Sig: Take 1 tablet by mouth Every 8 (Eight) Hours As Needed for Moderate Pain.     There are no discontinued medications.  Return if symptoms worsen or fail to improve. Next appointment with this provider is 9/19/2023.      Patient was given instructions and counseling regarding his condition or for health maintenance advice. Please see specific information pulled into the AVS if appropriate.    Patient Instructions   [facility] - [phone]  [address], [place] [address]           Hand hygiene was performed during entrance to exam room and following assessment of patient. This document is intended for medical expert use only.     EMR Dragon/Transcription disclaimer:   Much of this encounter note is an electronic transcription/translation of spoken language to printed text. The electronic translation of spoken language may permit erroneous, or at times, nonsensical words or phrases to be inadvertently transcribed.

## 2023-05-11 ENCOUNTER — TELEPHONE (OUTPATIENT)
Dept: FAMILY MEDICINE CLINIC | Facility: CLINIC | Age: 68
End: 2023-05-11
Payer: MEDICARE

## 2023-05-11 NOTE — TELEPHONE ENCOUNTER
Caller: Daniel Cagle    Relationship to patient: Self    Best call back number: [phone]  Patient is needing:     .”PATIENT HAD A MRI YESTERDAY ON 05/10/23 AND CALLED THE PROVIDER TO LET HER KNOW IT WAS DONE AND WHEN THEY GET RESULTS,CAN SOMEONE CALL HIM BACK AS SOON AS POSSIBLE.

## 2023-06-06 ENCOUNTER — OFFICE VISIT (OUTPATIENT)
Dept: ORTHOPEDIC SURGERY | Facility: CLINIC | Age: 68
End: 2023-06-06
Payer: MEDICARE

## 2023-06-06 VITALS — HEIGHT: 70 IN | HEART RATE: 59 BPM | BODY MASS INDEX: 27.92 KG/M2 | WEIGHT: 195 LBS

## 2023-06-06 DIAGNOSIS — M77.8 SUBSCAPULARIS TENDINITIS, RIGHT: Primary | ICD-10-CM

## 2023-06-06 DIAGNOSIS — M25.511 ACUTE PAIN OF RIGHT SHOULDER: ICD-10-CM

## 2023-06-06 DIAGNOSIS — M75.91 SUPRASPINATUS TENDINITIS, RIGHT: ICD-10-CM

## 2023-06-06 RX ADMIN — METHYLPREDNISOLONE ACETATE 80 MG: 40 INJECTION, SUSPENSION INTRA-ARTICULAR; INTRALESIONAL; INTRAMUSCULAR; SOFT TISSUE at 11:57

## 2023-06-06 RX ADMIN — LIDOCAINE HYDROCHLORIDE 2 ML: 10 INJECTION, SOLUTION EPIDURAL; INFILTRATION; INTRACAUDAL; PERINEURAL at 11:57
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
## 2023-06-07 RX ORDER — METHYLPREDNISOLONE ACETATE 40 MG/ML
80 INJECTION, SUSPENSION INTRA-ARTICULAR; INTRALESIONAL; INTRAMUSCULAR; SOFT TISSUE
Status: COMPLETED | OUTPATIENT
Start: 2023-06-06 | End: 2023-06-06

## 2023-06-07 RX ORDER — LIDOCAINE HYDROCHLORIDE 10 MG/ML
2 INJECTION, SOLUTION EPIDURAL; INFILTRATION; INTRACAUDAL; PERINEURAL
Status: COMPLETED | OUTPATIENT
Start: 2023-06-06 | End: 2023-06-06

## 2023-06-08 ENCOUNTER — TREATMENT (OUTPATIENT)
Dept: PHYSICAL THERAPY | Facility: CLINIC | Age: 68
End: 2023-06-08
Payer: MEDICARE

## 2023-06-08 DIAGNOSIS — Z56.89 DIFFICULTY PERFORMING WORK ACTIVITIES: ICD-10-CM

## 2023-06-08 DIAGNOSIS — M25.511 ACUTE PAIN OF RIGHT SHOULDER: Primary | ICD-10-CM

## 2023-06-08 PROCEDURE — 97110 THERAPEUTIC EXERCISES: CPT | Performed by: PHYSICAL THERAPIST

## 2023-06-08 PROCEDURE — 97162 PT EVAL MOD COMPLEX 30 MIN: CPT | Performed by: PHYSICAL THERAPIST
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
## 2023-06-11 DIAGNOSIS — I10 HYPERTENSION, BENIGN: ICD-10-CM

## 2023-06-11 DIAGNOSIS — F43.20 ADJUSTMENT DISORDER, UNSPECIFIED TYPE: ICD-10-CM

## 2023-06-12 DIAGNOSIS — I10 HYPERTENSION, BENIGN: ICD-10-CM

## 2023-06-12 DIAGNOSIS — F43.20 ADJUSTMENT DISORDER, UNSPECIFIED TYPE: ICD-10-CM

## 2023-06-12 RX ORDER — BUPROPION HYDROCHLORIDE 150 MG/1
TABLET ORAL
Qty: 90 TABLET | Refills: 1 | Status: SHIPPED | OUTPATIENT
Start: 2023-06-12

## 2023-06-12 RX ORDER — HYDROCHLOROTHIAZIDE 25 MG/1
25 TABLET ORAL DAILY
Qty: 90 TABLET | Refills: 3 | Status: SHIPPED | OUTPATIENT
Start: 2023-06-12

## 2023-06-12 RX ORDER — IRBESARTAN 150 MG/1
TABLET ORAL
Qty: 90 TABLET | Refills: 0 | Status: SHIPPED | OUTPATIENT
Start: 2023-06-12 | End: 2023-06-12 | Stop reason: SDUPTHER

## 2023-06-12 RX ORDER — IRBESARTAN 150 MG/1
150 TABLET ORAL NIGHTLY
Qty: 90 TABLET | Refills: 3 | Status: SHIPPED | OUTPATIENT
Start: 2023-06-12

## 2023-06-12 RX ORDER — BUPROPION HYDROCHLORIDE 150 MG/1
150 TABLET ORAL DAILY
Qty: 90 TABLET | Refills: 2 | Status: SHIPPED | OUTPATIENT
Start: 2023-06-12 | End: 2023-06-12 | Stop reason: SDUPTHER

## 2023-06-12 RX ORDER — CITALOPRAM 40 MG/1
20 TABLET ORAL 2 TIMES DAILY
Qty: 90 TABLET | Refills: 1 | Status: SHIPPED | OUTPATIENT
Start: 2023-06-12

## 2023-06-12 RX ORDER — HYDROCHLOROTHIAZIDE 12.5 MG/1
TABLET ORAL
Qty: 90 TABLET | Refills: 0 | Status: SHIPPED | OUTPATIENT
Start: 2023-06-12 | End: 2023-06-12 | Stop reason: SDUPTHER

## 2023-06-13 ENCOUNTER — TREATMENT (OUTPATIENT)
Dept: PHYSICAL THERAPY | Facility: CLINIC | Age: 68
End: 2023-06-13
Payer: MEDICARE

## 2023-06-13 ENCOUNTER — TELEPHONE (OUTPATIENT)
Dept: FAMILY MEDICINE CLINIC | Facility: CLINIC | Age: 68
End: 2023-06-13
Payer: MEDICARE

## 2023-06-13 DIAGNOSIS — Z56.89 DIFFICULTY PERFORMING WORK ACTIVITIES: ICD-10-CM

## 2023-06-13 DIAGNOSIS — M25.511 ACUTE PAIN OF RIGHT SHOULDER: Primary | ICD-10-CM

## 2023-06-13 PROCEDURE — 97110 THERAPEUTIC EXERCISES: CPT | Performed by: PHYSICAL THERAPIST

## 2023-06-13 PROCEDURE — 97140 MANUAL THERAPY 1/> REGIONS: CPT | Performed by: PHYSICAL THERAPIST

## 2023-06-13 NOTE — TELEPHONE ENCOUNTER
\Pharmacy Name: [facility] [place], [place] - [address]   - [phone]  - [phone] FX     What medication are you calling in regards to: buPROPion XL (WELLBUTRIN XL) 150 MG 24 hr tablet     What question does the pharmacy have: PHARMACY DID NOT RECEIVE SCRIPT SENT ON 6/12/2023. CAN THIS BE RESENT?

## 2023-06-13 NOTE — PROGRESS NOTES
Physical Therapy Daily Treatment Note      Patient: Daniel Cagle   : 1955  Diagnosis/ICD-10 Code:  Acute pain of right shoulder [M25.511]  Referring practitioner: Jerson Tovar PA-C  Date of Initial Visit: Type: THERAPY  Noted: 2023  Today's Date: 2023  Patient seen for 2 sessions         Daniel Cagle reports: his shoulder continues to hurt at this time with overhead movement and states at times is worse when doing flexion but everything else is feeling fine to do.    Objective   See Exercise, Manual, and Modality Logs for complete treatment.     Assessment/Plan  Pt. Tolerates exercise and stretching well promoting mobility but flexion remains sensitive at this time and more strength and stability will benefit pt. Also encouraging good scapular base at this time to alleviate shoulder strain.    Goals  Plan Goals: STG:  -Patient will report a reduction in R shoulder pain by >/=25% for improved tolerance to sleep and reaching overhead in 2 weeks.  -Patient will be independent with HEP in 3 weeks.  -Patient will report no pain with R shoulder flexion and abduction AROM for improved ability to perform reaching activities with RUE in 4 weeks.     LTG:  -Patient will demonstrate increased strength of R shoulder to grossly 4+/5 for improved ability to perform lifting activities in 12 weeks.  -Patient will demonstrate increased R shoulder internal rotation AROM = L shoulder internal rotation AROM in order to be able to wash his back using RUE in 12 weeks.  -Patient will report no R shoulder pain with lifting, pushing, and pulling activities for return to work as an  in 12 weeks.  -Patient will be able to stand and walk without looping his R thumb into his belt in 12 weeks.    Progress strengthening /stabilization /functional activity           Timed:         Manual Therapy:    15     mins  94415;     Therapeutic Exercise:    15     mins  03517;         Timed Treatment:   30   mins    Total Treatment:     30   mins    Neda Obrien PTA  Physical Therapist Assistant License #[id number]

## 2023-07-21 ENCOUNTER — ON CAMPUS - OUTPATIENT (OUTPATIENT)
Dept: URBAN - METROPOLITAN AREA HOSPITAL 2 | Facility: HOSPITAL | Age: 68
End: 2023-07-21
Payer: MEDICARE

## 2023-07-21 VITALS
RESPIRATION RATE: 12 BRPM | SYSTOLIC BLOOD PRESSURE: 123 MMHG | SYSTOLIC BLOOD PRESSURE: 132 MMHG | RESPIRATION RATE: 16 BRPM | DIASTOLIC BLOOD PRESSURE: 94 MMHG | DIASTOLIC BLOOD PRESSURE: 89 MMHG | HEART RATE: 66 BPM | SYSTOLIC BLOOD PRESSURE: 141 MMHG | OXYGEN SATURATION: 95 % | SYSTOLIC BLOOD PRESSURE: 108 MMHG | WEIGHT: 188 LBS | SYSTOLIC BLOOD PRESSURE: 110 MMHG | DIASTOLIC BLOOD PRESSURE: 70 MMHG | RESPIRATION RATE: 18 BRPM | RESPIRATION RATE: 14 BRPM | DIASTOLIC BLOOD PRESSURE: 72 MMHG | SYSTOLIC BLOOD PRESSURE: 130 MMHG | OXYGEN SATURATION: 99 % | SYSTOLIC BLOOD PRESSURE: 140 MMHG | DIASTOLIC BLOOD PRESSURE: 85 MMHG | TEMPERATURE: 96.7 F | SYSTOLIC BLOOD PRESSURE: 139 MMHG | HEART RATE: 58 BPM | HEART RATE: 65 BPM | DIASTOLIC BLOOD PRESSURE: 81 MMHG | HEIGHT: 70 IN | DIASTOLIC BLOOD PRESSURE: 73 MMHG | HEART RATE: 64 BPM | HEART RATE: 62 BPM | DIASTOLIC BLOOD PRESSURE: 97 MMHG | OXYGEN SATURATION: 98 % | DIASTOLIC BLOOD PRESSURE: 67 MMHG | HEART RATE: 68 BPM | HEART RATE: 63 BPM | HEART RATE: 60 BPM

## 2023-07-21 DIAGNOSIS — Z86.010 PERSONAL HISTORY OF COLONIC POLYPS: ICD-10-CM

## 2023-07-21 DIAGNOSIS — K57.30 DIVERTICULOSIS OF LARGE INTESTINE WITHOUT PERFORATION OR ABS: ICD-10-CM

## 2023-07-21 DIAGNOSIS — K44.9 DIAPHRAGMATIC HERNIA WITHOUT OBSTRUCTION OR GANGRENE: ICD-10-CM

## 2023-07-21 DIAGNOSIS — R13.10 DYSPHAGIA, UNSPECIFIED: ICD-10-CM

## 2023-07-21 PROCEDURE — G0105 COLORECTAL SCRN; HI RISK IND: HCPCS | Performed by: INTERNAL MEDICINE

## 2023-07-21 PROCEDURE — 43235 EGD DIAGNOSTIC BRUSH WASH: CPT | Performed by: INTERNAL MEDICINE

## 2023-08-01 ENCOUNTER — TREATMENT (OUTPATIENT)
Dept: PHYSICAL THERAPY | Facility: CLINIC | Age: 68
End: 2023-08-01
Payer: MEDICARE

## 2023-08-01 DIAGNOSIS — Z56.89 DIFFICULTY PERFORMING WORK ACTIVITIES: ICD-10-CM

## 2023-08-01 DIAGNOSIS — M25.511 ACUTE PAIN OF RIGHT SHOULDER: Primary | ICD-10-CM

## 2023-08-01 PROCEDURE — G0283 ELEC STIM OTHER THAN WOUND: HCPCS | Performed by: PHYSICAL THERAPIST

## 2023-08-01 PROCEDURE — 97110 THERAPEUTIC EXERCISES: CPT | Performed by: PHYSICAL THERAPIST

## 2023-08-01 PROCEDURE — 97140 MANUAL THERAPY 1/> REGIONS: CPT | Performed by: PHYSICAL THERAPIST

## 2023-08-03 ENCOUNTER — TREATMENT (OUTPATIENT)
Dept: PHYSICAL THERAPY | Facility: CLINIC | Age: 68
End: 2023-08-03
Payer: MEDICARE

## 2023-08-03 DIAGNOSIS — M25.511 ACUTE PAIN OF RIGHT SHOULDER: Primary | ICD-10-CM

## 2023-08-03 DIAGNOSIS — Z56.89 DIFFICULTY PERFORMING WORK ACTIVITIES: ICD-10-CM

## 2023-08-03 PROCEDURE — 97110 THERAPEUTIC EXERCISES: CPT | Performed by: PHYSICAL THERAPIST

## 2023-08-03 PROCEDURE — 97140 MANUAL THERAPY 1/> REGIONS: CPT | Performed by: PHYSICAL THERAPIST

## 2023-08-03 PROCEDURE — G0283 ELEC STIM OTHER THAN WOUND: HCPCS | Performed by: PHYSICAL THERAPIST

## 2023-08-08 ENCOUNTER — TREATMENT (OUTPATIENT)
Dept: PHYSICAL THERAPY | Facility: CLINIC | Age: 68
End: 2023-08-08
Payer: MEDICARE

## 2023-08-08 DIAGNOSIS — M25.511 ACUTE PAIN OF RIGHT SHOULDER: Primary | ICD-10-CM

## 2023-08-08 DIAGNOSIS — Z56.89 DIFFICULTY PERFORMING WORK ACTIVITIES: ICD-10-CM

## 2023-08-08 PROCEDURE — 97140 MANUAL THERAPY 1/> REGIONS: CPT | Performed by: PHYSICAL THERAPIST

## 2023-08-08 PROCEDURE — G0283 ELEC STIM OTHER THAN WOUND: HCPCS | Performed by: PHYSICAL THERAPIST

## 2023-08-08 PROCEDURE — 97110 THERAPEUTIC EXERCISES: CPT | Performed by: PHYSICAL THERAPIST

## 2023-08-08 NOTE — PROGRESS NOTES
Physical Therapy Daily Treatment Note      Patient: Daniel Cagle   : 1955  Diagnosis/ICD-10 Code:  Acute pain of right shoulder [M25.511]  Referring practitioner: Jerson Tovar PA-C  Date of Initial Visit: Type: THERAPY  Noted: 2023  Today's Date: 2023  Patient seen for 13 sessions         Daniel Cagle reports: he feels like overall the shoulder is better but a full day of work still manages to make it sore and even somewhat painful but it is just due to the nature of the repairs and necessity of forceful movements that causes it.    Objective   See Exercise, Manual, and Modality Logs for complete treatment.     Assessment/Plan  Pt. Tolerates treatment well this visit and continues to do well with prescribed exercise and has fair tolerance to manual intervention but winces at times due to tenderness specifically in UT along spine of scapula.    STG:  -Patient will report a reduction in R shoulder pain by >/=25% for improved tolerance to sleep and reaching overhead in 2 weeks. MET  -Patient will be independent with HEP in 3 weeks. MET  -Patient will report no pain with R shoulder flexion and abduction AROM for improved ability to perform reaching activities with RUE in 4 weeks. MET     LTG:  -Patient will demonstrate increased strength of R shoulder to grossly 4+/5 for improved ability to perform lifting activities in 12 weeks. MET  -Patient will demonstrate increased R shoulder internal rotation AROM = L shoulder internal rotation AROM in order to be able to wash his back using RUE in 12 weeks. MET  -Patient will report no R shoulder pain with lifting, pushing, and pulling activities for return to work as an  in 12 weeks. NOT MET, progressing  -Patient will be able to stand and walk without looping his R thumb into his belt in 12 weeks. NOT MET, progressing  Progress toward previous goals: Partially Met    Progress strengthening /stabilization /functional activity            Timed:         Manual Therapy:    15     mins  06629;     Therapeutic Exercise:    20     mins  26193;       Un-Timed:  Electrical Stimulation:    15     mins  27098 ( );      Timed Treatment:   35   mins   Total Treatment:     50   mins    Neda Obrien PTA  Physical Therapist Assistant License #[id number]

## 2023-08-10 ENCOUNTER — TREATMENT (OUTPATIENT)
Dept: PHYSICAL THERAPY | Facility: CLINIC | Age: 68
End: 2023-08-10
Payer: MEDICARE

## 2023-08-10 DIAGNOSIS — Z56.89 DIFFICULTY PERFORMING WORK ACTIVITIES: ICD-10-CM

## 2023-08-10 DIAGNOSIS — M25.511 ACUTE PAIN OF RIGHT SHOULDER: Primary | ICD-10-CM

## 2023-08-10 PROCEDURE — 97140 MANUAL THERAPY 1/> REGIONS: CPT | Performed by: PHYSICAL THERAPIST

## 2023-08-10 PROCEDURE — 97110 THERAPEUTIC EXERCISES: CPT | Performed by: PHYSICAL THERAPIST

## 2023-08-10 NOTE — PROGRESS NOTES
Physical Therapy Daily Treatment Note  [address]  [address], [place] [address]      Patient: Daniel Cagle   : 1955  Diagnosis/ICD-10 Code:  Acute pain of right shoulder [M25.511]   Problems Addressed this Visit    None  Visit Diagnoses       Acute pain of right shoulder    -  Primary    Difficulty performing work activities              Diagnoses         Codes Comments    Acute pain of right shoulder    -  Primary ICD-10-CM: M25.511  ICD-9-CM: 719.41     Difficulty performing work activities     ICD-10-CM: Z56.89  ICD-9-CM: V62.29            Referring practitioner: Jerson Tovar PA-C  Date of Initial Visit: Type: THERAPY  Noted: 2023  Today's Date: 8/10/2023    VISIT#: 14    Subjective Patient reports his shoulder overall feels better than when he first started PT but does continue to have some discomfort and pain into his superior shoulder with lifting movements.  He notes most discomfort at upper trap and distal shoulder blade.      Objective     See Exercise, Manual, and Modality Logs for complete treatment.     Assessment/Plan Patient tolerated progression of exercise well without complaints of increased shoulder pain but did note fatigue of shoulder musculature with resisted flexion and abduction.  He continues to report tenderness with manual interventions to R upper and middle trap.  Decreased pain and soreness noted at end of session..     Progress per Plan of Care and Progress strengthening /stabilization /functional activity         Timed:         Manual Therapy:    10     mins  51733;     Therapeutic Exercise:    18     mins  99364;     Neuromuscular Hermelindo:        mins  58703;    Therapeutic Activity:          mins  93790;     Gait Training:           mins  55442;     Ultrasound:          mins  85642;    Ionto                                   mins   95927    Un-Timed:  Electrical Stimulation:    15     mins  93076 ( );  Dry Needling          mins self-pay ;  34486  [facility]          mins 64430      Timed Treatment:   28   mins   Total Treatment:     43   mins    Angela Ramirez PT  [place] License # [id number]  Physical Therapist

## 2023-08-15 ENCOUNTER — TREATMENT (OUTPATIENT)
Dept: PHYSICAL THERAPY | Facility: CLINIC | Age: 68
End: 2023-08-15
Payer: MEDICARE

## 2023-08-15 DIAGNOSIS — Z56.89 DIFFICULTY PERFORMING WORK ACTIVITIES: ICD-10-CM

## 2023-08-15 DIAGNOSIS — M25.511 ACUTE PAIN OF RIGHT SHOULDER: Primary | ICD-10-CM

## 2023-08-15 PROCEDURE — 97110 THERAPEUTIC EXERCISES: CPT | Performed by: PHYSICAL THERAPIST

## 2023-08-15 PROCEDURE — 97112 NEUROMUSCULAR REEDUCATION: CPT | Performed by: PHYSICAL THERAPIST

## 2023-08-15 PROCEDURE — 97140 MANUAL THERAPY 1/> REGIONS: CPT | Performed by: PHYSICAL THERAPIST

## 2023-08-15 PROCEDURE — G0283 ELEC STIM OTHER THAN WOUND: HCPCS | Performed by: PHYSICAL THERAPIST

## 2023-08-15 NOTE — PROGRESS NOTES
Physical Therapy Daily Treatment Note      Patient: Daniel Cagle   : 1955  Diagnosis/ICD-10 Code:  Acute pain of right shoulder [M25.511]  Referring practitioner: Jerson Tovar PA-C  Date of Initial Visit: Type: THERAPY  Noted: 2023  Today's Date: 8/15/2023  Patient seen for 15 sessions         Daniel Cagle reports: this morning he is out of it stating he slept very heavily and he is having trouble getting going. Pt. States on the flip side of that though his shoulder and body  in general is feeling very good this morning. Pt. States he has not had any significant stand out moments of pain or discomfort since last session.    Objective   See Exercise, Manual, and Modality Logs for complete treatment.     Assessment/Plan  Pt. Tolerates treatment well showing improving strength and shoulder stability gradually over recent visits. Pt. Will continue to benefit form progression of dynamic scapular strengthening may add I Y T next.    STG:  -Patient will report a reduction in R shoulder pain by >/=25% for improved tolerance to sleep and reaching overhead in 2 weeks. MET  -Patient will be independent with HEP in 3 weeks. MET  -Patient will report no pain with R shoulder flexion and abduction AROM for improved ability to perform reaching activities with RUE in 4 weeks. MET     LTG:  -Patient will demonstrate increased strength of R shoulder to grossly 4+/5 for improved ability to perform lifting activities in 12 weeks. MET  -Patient will demonstrate increased R shoulder internal rotation AROM = L shoulder internal rotation AROM in order to be able to wash his back using RUE in 12 weeks. MET  -Patient will report no R shoulder pain with lifting, pushing, and pulling activities for return to work as an  in 12 weeks. NOT MET, progressing  -Patient will be able to stand and walk without looping his R thumb into his belt in 12 weeks. NOT MET, progressing  Progress toward previous goals:  Partially Met    Progress strengthening /stabilization /functional activity           Timed:         Manual Therapy:    10     mins  32905;     Therapeutic Exercise:    20     mins  77740;     Neuromuscular Hermelindo:  10      mins  50797;    Un-Timed:  Electrical Stimulation:   15      mins  33959 ( );      Timed Treatment:   40   mins   Total Treatment:     55   mins    Neda Obrien PTA  Physical Therapist Assistant License #[id number]

## 2023-08-17 ENCOUNTER — TREATMENT (OUTPATIENT)
Dept: PHYSICAL THERAPY | Facility: CLINIC | Age: 68
End: 2023-08-17
Payer: MEDICARE

## 2023-08-17 DIAGNOSIS — Z56.89 DIFFICULTY PERFORMING WORK ACTIVITIES: ICD-10-CM

## 2023-08-17 DIAGNOSIS — M25.511 ACUTE PAIN OF RIGHT SHOULDER: Primary | ICD-10-CM

## 2023-08-17 PROCEDURE — 97140 MANUAL THERAPY 1/> REGIONS: CPT | Performed by: PHYSICAL THERAPIST

## 2023-08-17 PROCEDURE — 97110 THERAPEUTIC EXERCISES: CPT | Performed by: PHYSICAL THERAPIST

## 2023-08-17 NOTE — PROGRESS NOTES
"Physical Therapy Daily Treatment Note  [address] [place] [address] [address], [place] [address]      Patient: Daniel Cagle   : 1955  Diagnosis/ICD-10 Code:  Acute pain of right shoulder [M25.511]   Problems Addressed this Visit    None  Visit Diagnoses       Acute pain of right shoulder    -  Primary    Difficulty performing work activities              Diagnoses         Codes Comments    Acute pain of right shoulder    -  Primary ICD-10-CM: M25.511  ICD-9-CM: 719.41     Difficulty performing work activities     ICD-10-CM: Z56.89  ICD-9-CM: V62.29            Referring practitioner: Jerson Toavr PA-C  Date of Initial Visit: Type: THERAPY  Noted: 2023  Today's Date: 2023    VISIT#: 16    Subjective Patient reports he is doing okay today but does have some tightness and a \"cramp\" in his neck on the right side.  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      Objective     See Exercise, Manual, and Modality Logs for complete treatment.     Assessment/Plan Patient with tenderness noted at R supraspinatus muscle bell and upper trap near cervicothoracic junction during manual interventions today.  Progressed overhead lifting activities with addition of the overhead press with some discomfort noted at R superior shoulder but inability to perform full elbow extension and shoulder flexion.  Technique improved with adjustment to overhead press using pulley system to simulate landmine press.     Progress per Plan of Care and Progress strengthening /stabilization /functional activity         Timed:         Manual Therapy:    10     mins  88923;     Therapeutic Exercise:     18    mins  20175;     Neuromuscular Hermelindo:        mins  64271;    Therapeutic Activity:          mins  92085;     Gait Training:           mins  31965;     Ultrasound:          mins  19575;    Ionto                                   mins   02638    Un-Timed:  Electrical " Stimulation:    15     mins  74040 ( );  Dry Needling          mins self-pay 60482; 20561  Traction          mins 51794      Timed Treatment:   28   mins   Total Treatment:     43   mins    Angela Ramirez PT  [place] License # [id number]  Physical Therapist

## 2023-08-24 ENCOUNTER — TREATMENT (OUTPATIENT)
Dept: PHYSICAL THERAPY | Facility: CLINIC | Age: 68
End: 2023-08-24
Payer: MEDICARE

## 2023-08-24 DIAGNOSIS — Z56.89 DIFFICULTY PERFORMING WORK ACTIVITIES: ICD-10-CM

## 2023-08-24 DIAGNOSIS — M25.511 ACUTE PAIN OF RIGHT SHOULDER: Primary | ICD-10-CM

## 2023-08-24 PROCEDURE — 97530 THERAPEUTIC ACTIVITIES: CPT | Performed by: PHYSICAL THERAPIST

## 2023-08-24 PROCEDURE — 97110 THERAPEUTIC EXERCISES: CPT | Performed by: PHYSICAL THERAPIST

## 2023-08-24 NOTE — PROGRESS NOTES
Physical Therapy Daily Treatment Note  [address] [facility] [address], [place] [address]      Patient: Daniel Cagle   : 1955  Diagnosis/ICD-10 Code:  Acute pain of right shoulder [M25.511]   Problems Addressed this Visit    None  Visit Diagnoses       Acute pain of right shoulder    -  Primary    Difficulty performing work activities              Diagnoses         Codes Comments    Acute pain of right shoulder    -  Primary ICD-10-CM: M25.511  ICD-9-CM: 719.41     Difficulty performing work activities     ICD-10-CM: Z56.89  ICD-9-CM: V62.29            Referring practitioner: Jerson Tovar PA-C  Date of Initial Visit: Type: THERAPY  Noted: 2023  Today's Date: 2023    VISIT#: 17    Subjective Patient reports his shoulders have been feeling good and did not experience any increased pain after his last PT session.  He reports he has felt the best he has in several years.      Objective     See Exercise, Manual, and Modality Logs for complete treatment.     Assessment/Plan Patient tolerated progression of strengthening without complaints of pain but did note tightness into his superior shoulders with overhead press.  Progress discussed with patient.  Plan for patient to attend 2 final sessions with emphasis on progressions for HEP and then d/c from PT.    Progress per Plan of Care and Progress strengthening /stabilization /functional activity         Timed:         Manual Therapy:         mins  64778;     Therapeutic Exercise:    15     mins  66774;     Neuromuscular Hermelindo:        mins  60572;    Therapeutic Activity:     13     mins  80816;     Gait Training:           mins  54023;     Ultrasound:          mins  92146;    Ionto                                   mins   46117    Un-Timed:  Electrical Stimulation:    15     mins  98669 ( );  Dry Needling          mins self-pay ;   Traction          mins 43151      Timed Treatment:   28   mins   Total Treatment:     43   mins    Angela  HECTOR Ramirez, PT  [place] License # [id number]  Physical Therapist

## 2023-08-29 ENCOUNTER — TREATMENT (OUTPATIENT)
Dept: PHYSICAL THERAPY | Facility: CLINIC | Age: 68
End: 2023-08-29
Payer: MEDICARE

## 2023-08-29 DIAGNOSIS — Z56.89 DIFFICULTY PERFORMING WORK ACTIVITIES: ICD-10-CM

## 2023-08-29 DIAGNOSIS — M25.511 ACUTE PAIN OF RIGHT SHOULDER: Primary | ICD-10-CM

## 2023-08-29 PROCEDURE — 97530 THERAPEUTIC ACTIVITIES: CPT | Performed by: PHYSICAL THERAPIST

## 2023-08-29 PROCEDURE — G0283 ELEC STIM OTHER THAN WOUND: HCPCS | Performed by: PHYSICAL THERAPIST

## 2023-08-29 PROCEDURE — 97110 THERAPEUTIC EXERCISES: CPT | Performed by: PHYSICAL THERAPIST

## 2023-08-29 NOTE — PROGRESS NOTES
Physical Therapy Daily Treatment Note  [address] [facility] [address], [place] [address]      Patient: Daniel Cagle   : 1955  Diagnosis/ICD-10 Code:  Acute pain of right shoulder [M25.511]   Problems Addressed this Visit    None  Visit Diagnoses       Acute pain of right shoulder    -  Primary    Difficulty performing work activities              Diagnoses         Codes Comments    Acute pain of right shoulder    -  Primary ICD-10-CM: M25.511  ICD-9-CM: 719.41     Difficulty performing work activities     ICD-10-CM: Z56.89  ICD-9-CM: V62.29            Referring practitioner: Jerson Tovar PA-C  Date of Initial Visit: Type: THERAPY  Noted: 2023  Today's Date: 2023    VISIT#: 18    Subjective Patient reports his shoulder feels a little sore today but overall is feeling much better.  He has a lot of work to get done this week so will be working until dark this evening.        Objective     See Exercise, Manual, and Modality Logs for complete treatment.     Assessment/Plan Patient tolerated exercise well with only mild superior shoulder pain B with overhead press.  He did not report increased pain into his R shoulder blade with activity this date.  Progress discussed with patient.  Plan for patient to attend 1 additional visit and then d/c with HEP.     Progress per Plan of Care and Progress strengthening /stabilization /functional activity         Timed:         Manual Therapy:         mins  44323;     Therapeutic Exercise:    15     mins  81736;     Neuromuscular Hermelindo:        mins  20874;    Therapeutic Activity:     14     mins  45299;     Gait Training:           mins  70296;     Ultrasound:          mins  60625;    Ionto                                   mins   16500    Un-Timed:  Electrical Stimulation:    15     mins  74681 ( );  Dry Needling          mins self-pay ;   Traction          mins 82127      Timed Treatment:   29   mins   Total Treatment:     44   mins    Angela YOUNG  Ash[place] PT  [place] License # [id number]  Physical Therapist

## 2023-08-31 ENCOUNTER — TREATMENT (OUTPATIENT)
Dept: PHYSICAL THERAPY | Facility: CLINIC | Age: 68
End: 2023-08-31
Payer: MEDICARE

## 2023-08-31 DIAGNOSIS — Z56.89 DIFFICULTY PERFORMING WORK ACTIVITIES: ICD-10-CM

## 2023-08-31 DIAGNOSIS — M25.511 ACUTE PAIN OF RIGHT SHOULDER: Primary | ICD-10-CM

## 2023-08-31 PROCEDURE — 97012 MECHANICAL TRACTION THERAPY: CPT | Performed by: PHYSICAL THERAPIST

## 2023-08-31 PROCEDURE — 97530 THERAPEUTIC ACTIVITIES: CPT | Performed by: PHYSICAL THERAPIST

## 2023-08-31 PROCEDURE — 97110 THERAPEUTIC EXERCISES: CPT | Performed by: PHYSICAL THERAPIST

## 2023-08-31 NOTE — PROGRESS NOTES
Physical Therapy Daily Treatment Note  [address], [place] [address]      Patient: Daniel Cagle   : 1955  Diagnosis/ICD-10 Code:  Acute pain of right shoulder [M25.511]   Problems Addressed this Visit    None  Visit Diagnoses       Acute pain of right shoulder    -  Primary    Difficulty performing work activities              Diagnoses         Codes Comments    Acute pain of right shoulder    -  Primary ICD-10-CM: M25.511  ICD-9-CM: 719.41     Difficulty performing work activities     ICD-10-CM: Z56.89  ICD-9-CM: V62.29            Referring practitioner: Jerson Tovar PA-C  Date of Initial Visit: Type: THERAPY  Noted: 2023  Today's Date: 2023    VISIT#: 19    Subjective Patient reports his shoulder blade is a little sore but overall is feeling pretty good.  He reports his shoulders feel a lot better than what they did a few months ago.       Objective   Active Range of Motion   Left Shoulder   Flexion: WFL  Abduction: WFL  External rotation BTH: T3   Internal rotation BTB: T9      Right Shoulder   Flexion: WFL  Abduction: WFL  External rotation BTH: T3   Internal rotation BTB: T10      Strength/Myotome Testing      Left Shoulder      Planes of Motion   Flexion: 4+   Abduction: 5   External rotation at 0ø: 4+   Internal rotation at 0ø: 5      Right Shoulder      Planes of Motion   Flexion: 4+   Abduction: 5  External rotation at 0ø: 4+  Internal rotation at 0ø: 5   See Exercise, Manual, and Modality Logs for complete treatment.     Assessment/Plan  STG:  -Patient will report a reduction in R shoulder pain by >/=25% for improved tolerance to sleep and reaching overhead in 2 weeks. MET  -Patient will be independent with HEP in 3 weeks. MET  -Patient will report no pain with R shoulder flexion and abduction AROM for improved ability to perform reaching activities with RUE in 4 weeks. MET     LTG:  -Patient will demonstrate increased strength of R shoulder to grossly 4+/5 for  improved ability to perform lifting activities in 12 weeks. MET  -Patient will demonstrate increased R shoulder internal rotation AROM = L shoulder internal rotation AROM in order to be able to wash his back using RUE in 12 weeks. MET  -Patient will report no R shoulder pain with lifting, pushing, and pulling activities for return to work as an  in 12 weeks. NOT MET, progressing (at worst 2/10)  -Patient will be able to stand and walk without looping his R thumb into his belt in 12 weeks. MET    Patient has attended 19 PT sessions with most goals met.  His strength has significantly improved and is no longer experiencing pain with MMT.  He has full ROM in his shoulders with intermittent min pain noted near end ROM.  He has been able to resume all his activities with work without limitation but intermittent mild pain noted except at his R shoulder blade.  Progress discussed with patient.  Plan to d/c with HEP and follow up with therapist as needed.    Plan to DC with HEP         Timed:         Manual Therapy:         mins  15124;     Therapeutic Exercise:    15     mins  60733;     Neuromuscular Hermelindo:        mins  46743;    Therapeutic Activity:     16     mins  89853;     Gait Training:           mins  80554;     Ultrasound:          mins  44062;    Ionto                                   mins   07894    Un-Timed:  Electrical Stimulation:       15  mins  97462 (MC );  Dry Needling          mins self-pay [id number]; 20561  Traction          mins 60187      Timed Treatment:   31   mins   Total Treatment:     46   mins    Angela Ramirez PT  [place] License # [id number]  Physical Therapist    Discharge Summary  Discharge Summary from Physical Therapy Report      Dates  PT visit: 6/8/23  - 8/31/23  Number of Visits: 19     Discharge Status of Patient: See MD Note dated 8/31/23    Goals: Partially Met    Discharge Plan: Continue with current home exercise program as instructed    Comments See above  assessment    Date of Discharge 8/31/23        Angela Ramirez, PT  Physical Therapist

## 2023-09-19 ENCOUNTER — OFFICE VISIT (OUTPATIENT)
Dept: FAMILY MEDICINE CLINIC | Facility: CLINIC | Age: 68
End: 2023-09-19
Payer: MEDICARE

## 2023-09-19 VITALS
HEIGHT: 70 IN | WEIGHT: 191 LBS | BODY MASS INDEX: 27.35 KG/M2 | SYSTOLIC BLOOD PRESSURE: 130 MMHG | OXYGEN SATURATION: 98 % | DIASTOLIC BLOOD PRESSURE: 74 MMHG | HEART RATE: 70 BPM

## 2023-09-19 DIAGNOSIS — F43.20 ADJUSTMENT DISORDER, UNSPECIFIED TYPE: ICD-10-CM

## 2023-09-19 DIAGNOSIS — M54.12 CERVICAL RADICULOPATHY: ICD-10-CM

## 2023-09-19 DIAGNOSIS — Z11.59 ENCOUNTER FOR HEPATITIS C SCREENING TEST FOR LOW RISK PATIENT: Primary | ICD-10-CM

## 2023-09-19 DIAGNOSIS — N52.9 ERECTILE DYSFUNCTION, UNSPECIFIED ERECTILE DYSFUNCTION TYPE: ICD-10-CM

## 2023-09-19 DIAGNOSIS — M25.511 CHRONIC RIGHT SHOULDER PAIN: ICD-10-CM

## 2023-09-19 DIAGNOSIS — I10 HYPERTENSION, BENIGN: ICD-10-CM

## 2023-09-19 DIAGNOSIS — E78.2 MIXED HYPERLIPIDEMIA: ICD-10-CM

## 2023-09-19 DIAGNOSIS — R74.8 ELEVATED ALKALINE PHOSPHATASE LEVEL: ICD-10-CM

## 2023-09-19 DIAGNOSIS — G89.29 CHRONIC RIGHT SHOULDER PAIN: ICD-10-CM

## 2023-09-19 DIAGNOSIS — Z12.5 SCREENING PSA (PROSTATE SPECIFIC ANTIGEN): ICD-10-CM

## 2023-09-19 RX ORDER — BUPROPION HYDROCHLORIDE 150 MG/1
150 TABLET ORAL DAILY
Qty: 90 TABLET | Refills: 1 | Status: SHIPPED | OUTPATIENT
Start: 2023-09-19

## 2023-09-19 RX ORDER — LORATADINE 10 MG/1
CAPSULE, LIQUID FILLED ORAL
COMMUNITY

## 2023-09-19 RX ORDER — SIMVASTATIN 40 MG
40 TABLET ORAL DAILY
Qty: 90 TABLET | Refills: 2 | Status: SHIPPED | OUTPATIENT
Start: 2023-09-19

## 2023-09-19 RX ORDER — SILDENAFIL 50 MG/1
50 TABLET, FILM COATED ORAL DAILY PRN
Qty: 6 TABLET | Refills: 12 | Status: SHIPPED | OUTPATIENT
Start: 2023-09-19

## 2023-09-19 RX ORDER — CITALOPRAM 40 MG/1
20 TABLET ORAL 2 TIMES DAILY
Qty: 90 TABLET | Refills: 1 | Status: SHIPPED | OUTPATIENT
Start: 2023-09-19

## 2023-09-19 NOTE — PATIENT INSTRUCTIONS
My pillow.     Continue current plan of care as discussed.   Take medication as ordered (if applicable).    Practice good sleep hygiene.  Eat a well balanced diet with fresh fruit and vegetables.    Stay hydrated, drink water daily.      Limit sodium: salt, seasoned salt, garlic salt, onion salt, celery salt, etc.   Limit sweetened beverages, sodas, juices.    Bake, boil, broil or grill your food, avoid eating fried foods.   Regular exercise is important.  Incorporate weight or resistance into your routine.

## 2023-09-20 PROBLEM — D72.819 LEUCOPENIA: Status: ACTIVE | Noted: 2023-09-20

## 2023-09-23 DIAGNOSIS — I10 HYPERTENSION, BENIGN: ICD-10-CM

## 2023-09-25 RX ORDER — IRBESARTAN 150 MG/1
TABLET ORAL
Qty: 90 TABLET | Refills: 0 | Status: SHIPPED | OUTPATIENT
Start: 2023-09-25

## 2023-09-27 LAB
ALBUMIN SERPL-MCNC: 4.6 G/DL (ref 3.9–4.9)
ALBUMIN/GLOB SERPL: 2.3 {RATIO} (ref 1.2–2.2)
ALP SERPL-CCNC: 125 IU/L (ref 44–121)
ALT SERPL-CCNC: 14 IU/L (ref 0–44)
AST SERPL-CCNC: 20 IU/L (ref 0–40)
BASOPHILS # BLD AUTO: 0.1 X10E3/UL (ref 0–0.2)
BASOPHILS NFR BLD AUTO: 1 %
BILIRUB SERPL-MCNC: 0.2 MG/DL (ref 0–1.2)
BUN SERPL-MCNC: 12 MG/DL (ref 8–27)
BUN/CREAT SERPL: 17 (ref 10–24)
CALCIUM SERPL-MCNC: 9.8 MG/DL (ref 8.6–10.2)
CHLORIDE SERPL-SCNC: 95 MMOL/L (ref 96–106)
CHOLEST SERPL-MCNC: 161 MG/DL (ref 100–199)
CO2 SERPL-SCNC: 27 MMOL/L (ref 20–29)
CREAT SERPL-MCNC: 0.72 MG/DL (ref 0.76–1.27)
EGFRCR SERPLBLD CKD-EPI 2021: 100 ML/MIN/1.73
EOSINOPHIL # BLD AUTO: 0.1 X10E3/UL (ref 0–0.4)
EOSINOPHIL NFR BLD AUTO: 2 %
ERYTHROCYTE [DISTWIDTH] IN BLOOD BY AUTOMATED COUNT: 15.9 % (ref 11.6–15.4)
GLOBULIN SER CALC-MCNC: 2 G/DL (ref 1.5–4.5)
GLUCOSE SERPL-MCNC: 104 MG/DL (ref 70–99)
HCT VFR BLD AUTO: 40.4 % (ref 37.5–51)
HCV IGG SERPL QL IA: NON REACTIVE
HDLC SERPL-MCNC: 76 MG/DL
HGB BLD-MCNC: 12.8 G/DL (ref 13–17.7)
IMM GRANULOCYTES # BLD AUTO: 0 X10E3/UL (ref 0–0.1)
IMM GRANULOCYTES NFR BLD AUTO: 0 %
INTACT PTH: NORMAL
LDLC SERPL CALC-MCNC: 72 MG/DL (ref 0–99)
LYMPHOCYTES # BLD AUTO: 0.4 X10E3/UL (ref 0.7–3.1)
LYMPHOCYTES NFR BLD AUTO: 9 %
MAGNESIUM SERPL-MCNC: 2.1 MG/DL (ref 1.6–2.3)
MCH RBC QN AUTO: 27.9 PG (ref 26.6–33)
MCHC RBC AUTO-ENTMCNC: 31.7 G/DL (ref 31.5–35.7)
MCV RBC AUTO: 88 FL (ref 79–97)
MONOCYTES # BLD AUTO: 0.7 X10E3/UL (ref 0.1–0.9)
MONOCYTES NFR BLD AUTO: 16 %
NEUTROPHILS # BLD AUTO: 3 X10E3/UL (ref 1.4–7)
NEUTROPHILS NFR BLD AUTO: 72 %
PATH INTERP BLD-IMP: ABNORMAL
PATH REV BLD -IMP: ABNORMAL
PATHOLOGIST NAME: ABNORMAL
PHOSPHATE SERPL-MCNC: 3.4 MG/DL (ref 2.8–4.1)
PLATELET # BLD AUTO: 411 X10E3/UL (ref 150–450)
POTASSIUM SERPL-SCNC: 4.9 MMOL/L (ref 3.5–5.2)
PROT SERPL-MCNC: 6.6 G/DL (ref 6–8.5)
PSA SERPL-MCNC: 3.2 NG/ML (ref 0–4)
PTH-INTACT SERPL-MCNC: 32 PG/ML (ref 15–65)
RBC # BLD AUTO: 4.59 X10E6/UL (ref 4.14–5.8)
SODIUM SERPL-SCNC: 137 MMOL/L (ref 134–144)
TESTOST FREE SERPL-MCNC: 3 PG/ML (ref 6.6–18.1)
TESTOST SERPL-MCNC: 551.4 NG/DL (ref 264–916)
TRIGL SERPL-MCNC: 65 MG/DL (ref 0–149)
VLDLC SERPL CALC-MCNC: 13 MG/DL (ref 5–40)
WBC # BLD AUTO: 4.2 X10E3/UL (ref 3.4–10.8)

## 2023-10-08 DIAGNOSIS — D72.810 LYMPHOPENIA: Primary | ICD-10-CM

## 2023-10-08 PROBLEM — R05.9 COUGH: Status: RESOLVED | Noted: 2022-03-10 | Resolved: 2023-10-08

## 2023-10-08 PROBLEM — R10.13 EPIGASTRIC PAIN: Status: RESOLVED | Noted: 2022-04-21 | Resolved: 2023-10-08

## 2023-10-08 PROBLEM — J45.909 ASTHMA: Status: ACTIVE | Noted: 2023-10-08

## 2023-11-09 NOTE — PROGRESS NOTES
Hematology/Oncology Outpatient Consultation    Patient name: Daniel Cagle  : 1955  MRN: [id number]  Primary Care Physician: Chapis Hernandez APRN  Referring Physician: Chapis Hernandez APRN  Reason For Consult:     Chief Complaint   Patient presents with    Appointment     Lymphopenia       History of Present Illness:    This is a 68-year-old male has referred secondary to lymphopenia.  Review of his CBCs from 3/20/2023 white count was 2.9 hemoglobin 13.6 platelets 404 differential as well as 3% neutrophils 16% lymphocytes and absolute lymphocyte count was 0.5.  Over the course of time his white count has remained normal at 5.9 hemoglobin 13.6 platelets 214 with 70% neutrophils lymphocytes 7.1 with absolute lymphocyte count of 0.4.     Patient complains of tiredness for a long time but no night sweats. He has had intentional weight loss.    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    Patient is     Family history with pancreatic cancer at age 58  Brother had  lung cancer      Past Medical History:   Diagnosis Date    Adult situational stress disorder     Allergic rhinitis     Anemia     Ankle sprain childhood    Arthritis of back 1984    Lower Spine Degen    Congenital hiatus hernia     Diverticulosis     Dysphagia     ED (erectile dysfunction)     Hyperlipidemia     Hypertension     Low back strain     pulled lifting    Lumbosacral disc disease     ruptured disc L5-S1    Olecranon bursitis     Rotator cuff syndrome     pulled throwing softball    Tennis elbow  ????    Golfers Elbow from hammering       Past Surgical History:   Procedure Laterality Date    COLONOSCOPY  10/30/2017    Dr. Siddiqui    COLONOSCOPY  2000    adenoma polyp; Dr. White    COLONOSCOPY  2008    Dr. Marin; hyperplastic polyp    HERNIA REPAIR      TRIGGER POINT  INJECTION  1994    Sacroiliac Joint         Current Outpatient Medications:     buPROPion XL (WELLBUTRIN XL) 150 MG 24 hr tablet, Take 1 tablet by mouth Daily., Disp: 90 tablet, Rfl: 1    citalopram (CeleXA) 40 MG tablet, Take 0.5 tablets by mouth 2 (Two) Times a Day. Taking one tablet daily. Breaking it in half to take half in the AM and half in the evening., Disp: 90 tablet, Rfl: 1    diclofenac (VOLTAREN) 50 MG EC tablet, Take 1 tablet by mouth 2 (Two) Times a Day., Disp: 60 tablet, Rfl: 12    hydroCHLOROthiazide (HYDRODIURIL) 25 MG tablet, Take 1 tablet by mouth Daily., Disp: 90 tablet, Rfl: 3    irbesartan (AVAPRO) 150 MG tablet, TAKE 1 TABLET BY MOUTH ONCE DAILY AT NIGHT, Disp: 90 tablet, Rfl: 0    Loratadine 10 MG capsule, Take  by mouth., Disp: , Rfl:     sildenafil (VIAGRA) 50 MG tablet, Take 1 tablet by mouth Daily As Needed for Erectile Dysfunction. Take 30 minutes to 4 hours prior to sexual activity., Disp: 6 tablet, Rfl: 12    simvastatin (ZOCOR) 40 MG tablet, Take 1 tablet by mouth Daily., Disp: 90 tablet, Rfl: 2    Allergies   Allergen Reactions    Cyclobenzaprine Unknown - High Severity     Jerking movements.     Prednisone Other (See Comments)     Made hyper and burning        Immunization History   Administered Date(s) Administered    COVID-19 (MODERNA) 1st,2nd,3rd Dose Monovalent 2021, 2021, 2021    Tdap 2020       Family History   Problem Relation Age of Onset    Stroke Father     Dislocations Mother         Broke Hip / Replaced    Cancer Sister         Pancriatic Cancer    Cancer Brother         Lung Cancer       Cancer-related family history includes Cancer in his brother and sister.    Social History     Tobacco Use    Smoking status: Former     Packs/day: 1.00     Years: 5.00     Additional pack years: 0.00     Total pack years: 5.00     Types: Cigarettes     Start date: 1971     Quit date: 1976     Years since quittin.8    Smokeless tobacco: Never  "  Vaping Use    Vaping Use: Never used   Substance Use Topics    Alcohol use: No    Drug use: No       ROS:    Review of Systems   Constitutional:  Positive for fatigue. Negative for chills and fever.   HENT:  Negative for congestion, drooling, ear discharge, rhinorrhea, sinus pressure and tinnitus.    Eyes:  Negative for photophobia, pain and discharge.   Respiratory:  Negative for apnea, choking and stridor.    Cardiovascular:  Negative for palpitations.   Gastrointestinal:  Negative for abdominal distention, abdominal pain and anal bleeding.   Endocrine: Negative for polydipsia and polyphagia.   Genitourinary:  Negative for decreased urine volume, flank pain and genital sores.   Musculoskeletal:  Negative for gait problem, neck pain and neck stiffness.   Skin:  Negative for color change, rash and wound.   Neurological:  Negative for tremors, seizures, syncope, facial asymmetry and speech difficulty.   Hematological:  Negative for adenopathy.   Psychiatric/Behavioral:  Negative for agitation, confusion, hallucinations and self-injury. The patient is not hyperactive.        Objective:    Vitals:    11/10/23 0859   BP: 151/88   Pulse: 55   Resp: 18   Temp: 98 °F (36.7 °C)   TempSrc: Infrared   SpO2: 98%   Weight: 86.6 kg (191 lb)   Height: 177.8 cm (70\")   PainSc: 0-No pain     Body mass index is 27.41 kg/m².    ECOG  (0) Fully active, able to carry on all predisease performance without restriction    Physical Exam:  Physical Exam  Vitals and nursing note reviewed.   Constitutional:       General: He is not in acute distress.     Appearance: He is not diaphoretic.   HENT:      Head: Normocephalic and atraumatic.   Eyes:      General: No scleral icterus.        Right eye: No discharge.         Left eye: No discharge.      Conjunctiva/sclera: Conjunctivae normal.   Neck:      Thyroid: No thyromegaly.   Cardiovascular:      Rate and Rhythm: Normal rate and regular rhythm.      Heart sounds: Normal heart sounds.      No " friction rub. No gallop.   Pulmonary:      Effort: Pulmonary effort is normal. No respiratory distress.      Breath sounds: No stridor. No wheezing.   Abdominal:      General: Bowel sounds are normal.      Palpations: Abdomen is soft. There is no mass.      Tenderness: There is no abdominal tenderness. There is no guarding or rebound.   Musculoskeletal:         General: No tenderness. Normal range of motion.      Cervical back: Normal range of motion and neck supple.   Lymphadenopathy:      Cervical: No cervical adenopathy.   Skin:     General: Skin is warm.      Findings: No erythema or rash.   Neurological:      Mental Status: He is alert and oriented to person, place, and time.      Motor: No abnormal muscle tone.   Psychiatric:         Behavior: Behavior normal.       RECENT LABS  WBC   Date Value Ref Range Status   11/10/2023 5.92 3.40 - 10.80 10*3/mm3 Final   09/19/2023 4.2 3.4 - 10.8 x10E3/uL Final     RBC   Date Value Ref Range Status   11/10/2023 4.82 4.14 - 5.80 10*6/mm3 Final   09/19/2023 4.59 4.14 - 5.80 x10E6/uL Final     Hemoglobin   Date Value Ref Range Status   11/10/2023 13.6 13.0 - 17.7 g/dL Final     Hematocrit   Date Value Ref Range Status   11/10/2023 42.3 37.5 - 51.0 % Final     MCV   Date Value Ref Range Status   11/10/2023 87.8 79.0 - 97.0 fL Final     MCH   Date Value Ref Range Status   11/10/2023 28.2 26.6 - 33.0 pg Final     MCHC   Date Value Ref Range Status   11/10/2023 32.2 31.5 - 35.7 g/dL Final     RDW   Date Value Ref Range Status   11/10/2023 15.6 (H) 12.3 - 15.4 % Final     RDW-SD   Date Value Ref Range Status   11/10/2023 49.0 37.0 - 54.0 fl Final     MPV   Date Value Ref Range Status   11/10/2023 9.6 6.0 - 12.0 fL Final     Platelets   Date Value Ref Range Status   11/10/2023 214 140 - 450 10*3/mm3 Final     Neutrophil %   Date Value Ref Range Status   11/10/2023 76.4 (H) 42.7 - 76.0 % Final     Lymphocyte %   Date Value Ref Range Status   11/10/2023 7.1 (L) 19.6 - 45.3 % Final      Monocyte %   Date Value Ref Range Status   11/10/2023 13.0 (H) 5.0 - 12.0 % Final     Eosinophil %   Date Value Ref Range Status   11/10/2023 3.0 0.3 - 6.2 % Final     Basophil %   Date Value Ref Range Status   11/10/2023 0.5 0.0 - 1.5 % Final     Neutrophils, Absolute   Date Value Ref Range Status   11/10/2023 4.52 1.70 - 7.00 10*3/mm3 Final     Lymphocytes, Absolute   Date Value Ref Range Status   11/10/2023 0.42 (L) 0.70 - 3.10 10*3/mm3 Final     Monocytes, Absolute   Date Value Ref Range Status   11/10/2023 0.77 0.10 - 0.90 10*3/mm3 Final     Eosinophils, Absolute   Date Value Ref Range Status   11/10/2023 0.18 0.00 - 0.40 10*3/mm3 Final     Basophils, Absolute   Date Value Ref Range Status   11/10/2023 0.03 0.00 - 0.20 10*3/mm3 Final       Lab Results   Component Value Date    GLUCOSE 104 (H) 09/19/2023    BUN 12 09/19/2023    CREATININE 0.72 (L) 09/19/2023    EGFRIFNONA 93 04/26/2021    EGFRIFAFRI 108 04/26/2021    BCR 17 09/19/2023    K 4.9 09/19/2023    CO2 27 09/19/2023    CALCIUM 9.8 09/19/2023    PROTENTOTREF 6.6 09/19/2023    ALBUMIN 4.6 09/19/2023    LABIL2 2.3 (H) 09/19/2023    AST 20 09/19/2023    ALT 14 09/19/2023         Assessment & Plan   Lymphoma, unspecified body region, unspecified lymphoma type  - CBC & Differential      Lymphopenia, chronic and no other cytopenias identified.  Rule drug induced, autoimmune      Plans      Reviewed differential diagnosis with patient  Labs have been ordered  Ultrasound of the abdomen to check for spleen size  Viral hepatitis panel, flow cytometry, CANDACE  Follow-up 4 weeks  All questions answered    Patient verbalized understanding and is in agreement of the above plan.              I spent 45 total minutes, face-to-face, caring for Daniel today. 90% of this time involved counseling and/or coordination of care as documented within this note.

## 2023-11-10 ENCOUNTER — LAB (OUTPATIENT)
Dept: LAB | Facility: HOSPITAL | Age: 68
End: 2023-11-10
Payer: MEDICARE

## 2023-11-10 ENCOUNTER — CONSULT (OUTPATIENT)
Dept: ONCOLOGY | Facility: CLINIC | Age: 68
End: 2023-11-10
Payer: MEDICARE

## 2023-11-10 ENCOUNTER — PATIENT ROUNDING (BHMG ONLY) (OUTPATIENT)
Dept: ONCOLOGY | Facility: CLINIC | Age: 68
End: 2023-11-10
Payer: MEDICARE

## 2023-11-10 VITALS
RESPIRATION RATE: 18 BRPM | HEIGHT: 70 IN | HEART RATE: 55 BPM | WEIGHT: 191 LBS | DIASTOLIC BLOOD PRESSURE: 88 MMHG | BODY MASS INDEX: 27.35 KG/M2 | TEMPERATURE: 98 F | SYSTOLIC BLOOD PRESSURE: 151 MMHG | OXYGEN SATURATION: 98 %

## 2023-11-10 DIAGNOSIS — Z11.59 ENCOUNTER FOR SCREENING FOR OTHER VIRAL DISEASES: ICD-10-CM

## 2023-11-10 DIAGNOSIS — D72.819 LEUKOPENIA, UNSPECIFIED TYPE: ICD-10-CM

## 2023-11-10 DIAGNOSIS — R74.8 ELEVATED ALKALINE PHOSPHATASE LEVEL: ICD-10-CM

## 2023-11-10 DIAGNOSIS — C85.90 LYMPHOMA, UNSPECIFIED BODY REGION, UNSPECIFIED LYMPHOMA TYPE: Primary | ICD-10-CM

## 2023-11-10 DIAGNOSIS — C85.90 LYMPHOMA, UNSPECIFIED BODY REGION, UNSPECIFIED LYMPHOMA TYPE: ICD-10-CM

## 2023-11-10 LAB
BASOPHILS # BLD AUTO: 0.03 10*3/MM3 (ref 0–0.2)
BASOPHILS NFR BLD AUTO: 0.5 % (ref 0–1.5)
DEPRECATED RDW RBC AUTO: 49 FL (ref 37–54)
EOSINOPHIL # BLD AUTO: 0.18 10*3/MM3 (ref 0–0.4)
EOSINOPHIL NFR BLD AUTO: 3 % (ref 0.3–6.2)
ERYTHROCYTE [DISTWIDTH] IN BLOOD BY AUTOMATED COUNT: 15.6 % (ref 12.3–15.4)
FERRITIN SERPL-MCNC: 12.44 NG/ML (ref 30–400)
FOLATE SERPL-MCNC: 16.4 NG/ML (ref 4.78–24.2)
HAPTOGLOB SERPL-MCNC: 259 MG/DL (ref 30–200)
HBV SURFACE AB SER RIA-ACNC: NORMAL
HBV SURFACE AG SERPL QL IA: NORMAL
HCT VFR BLD AUTO: 42.3 % (ref 37.5–51)
HCV AB SER DONR QL: NORMAL
HGB BLD-MCNC: 13.6 G/DL (ref 13–17.7)
LDH SERPL-CCNC: 207 U/L (ref 135–225)
LYMPHOCYTES # BLD AUTO: 0.42 10*3/MM3 (ref 0.7–3.1)
LYMPHOCYTES NFR BLD AUTO: 7.1 % (ref 19.6–45.3)
MCH RBC QN AUTO: 28.2 PG (ref 26.6–33)
MCHC RBC AUTO-ENTMCNC: 32.2 G/DL (ref 31.5–35.7)
MCV RBC AUTO: 87.8 FL (ref 79–97)
MONOCYTES # BLD AUTO: 0.77 10*3/MM3 (ref 0.1–0.9)
MONOCYTES NFR BLD AUTO: 13 % (ref 5–12)
NEUTROPHILS NFR BLD AUTO: 4.52 10*3/MM3 (ref 1.7–7)
NEUTROPHILS NFR BLD AUTO: 76.4 % (ref 42.7–76)
PATHOLOGY REVIEW: YES
PLATELET # BLD AUTO: 214 10*3/MM3 (ref 140–450)
PMV BLD AUTO: 9.6 FL (ref 6–12)
RBC # BLD AUTO: 4.82 10*6/MM3 (ref 4.14–5.8)
RETICS # AUTO: 0.04 10*6/MM3 (ref 0.02–0.13)
RETICS/RBC NFR AUTO: 0.85 % (ref 0.7–1.9)
VIT B12 BLD-MCNC: 551 PG/ML (ref 211–946)
WBC NRBC COR # BLD: 5.92 10*3/MM3 (ref 3.4–10.8)

## 2023-11-10 PROCEDURE — 86038 ANTINUCLEAR ANTIBODIES: CPT | Performed by: INTERNAL MEDICINE

## 2023-11-10 PROCEDURE — 85045 AUTOMATED RETICULOCYTE COUNT: CPT | Performed by: INTERNAL MEDICINE

## 2023-11-10 PROCEDURE — 86706 HEP B SURFACE ANTIBODY: CPT | Performed by: INTERNAL MEDICINE

## 2023-11-10 PROCEDURE — 86803 HEPATITIS C AB TEST: CPT | Performed by: INTERNAL MEDICINE

## 2023-11-10 PROCEDURE — 83615 LACTATE (LD) (LDH) ENZYME: CPT | Performed by: INTERNAL MEDICINE

## 2023-11-10 PROCEDURE — 36415 COLL VENOUS BLD VENIPUNCTURE: CPT

## 2023-11-10 PROCEDURE — 82728 ASSAY OF FERRITIN: CPT | Performed by: INTERNAL MEDICINE

## 2023-11-10 PROCEDURE — 87340 HEPATITIS B SURFACE AG IA: CPT | Performed by: INTERNAL MEDICINE

## 2023-11-10 PROCEDURE — 82607 VITAMIN B-12: CPT | Performed by: INTERNAL MEDICINE

## 2023-11-10 PROCEDURE — 83010 ASSAY OF HAPTOGLOBIN QUANT: CPT | Performed by: INTERNAL MEDICINE

## 2023-11-10 PROCEDURE — 85025 COMPLETE CBC W/AUTO DIFF WBC: CPT

## 2023-11-10 PROCEDURE — 82746 ASSAY OF FOLIC ACID SERUM: CPT | Performed by: INTERNAL MEDICINE

## 2023-11-10 NOTE — LETTER
November 10, 2023     THU Mccarthy  [address] [facility] [address]  [facility] [address]  [place] [address]    Patient: Daniel Cagle   YOB: 1955   Date of Visit: 11/10/2023     Dear THU Mccarthy:       Thank you for referring Daniel Cagle to me for evaluation. Below are the relevant portions of my assessment and plan of care.    If you have questions, please do not hesitate to call me. I look forward to following Daniel along with you.         Sincerely,        Martha Espino MD        CC: No Recipients    Martha Espino MD  11/10/23 1616  Sign when Signing Visit   Hematology/Oncology Outpatient Consultation    Patient name: Daniel Cagle  : 1955  MRN: [id number]  Primary Care Physician: Chapis Hernandez APRN  Referring Physician: Chapis Hernandez APRN  Reason For Consult:     Chief Complaint   Patient presents with   • Appointment     Lymphopenia       History of Present Illness:    This is a 68-year-old male has referred secondary to lymphopenia.  Review of his CBCs from 3/20/2023 white count was 2.9 hemoglobin 13.6 platelets 404 differential as well as 3% neutrophils 16% lymphocytes and absolute lymphocyte count was 0.5.  Over the course of time his white count has remained normal at 5.9 hemoglobin 13.6 platelets 214 with 70% neutrophils lymphocytes 7.1 with absolute lymphocyte count of 0.4.     Patient complains of tiredness for a long time but no night sweats. He has had intentional weight loss.    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    Golfers Elbow from hammering       Past Surgical History:   Procedure Laterality Date   • COLONOSCOPY  10/30/2017    Dr. Siddiqui   • COLONOSCOPY  11/30/2000    adenoma polyp; Dr. White   • COLONOSCOPY  08/18/2008    Dr. Marin; hyperplastic polyp   • HERNIA REPAIR     • TRIGGER POINT INJECTION  1994    Sacroiliac Joint         Current Outpatient Medications:   •  buPROPion XL (WELLBUTRIN XL) 150 MG 24 hr tablet, Take 1 tablet by mouth Daily., Disp: 90 tablet, Rfl: 1  •  citalopram (CeleXA) 40 MG tablet, Take 0.5 tablets by mouth 2 (Two) Times a Day. Taking one tablet daily. Breaking it in half to take half in the AM and half in the evening., Disp: 90 tablet, Rfl: 1  •  diclofenac (VOLTAREN) 50 MG EC tablet, Take 1 tablet by mouth 2 (Two) Times a Day., Disp: 60 tablet, Rfl: 12  •  hydroCHLOROthiazide (HYDRODIURIL) 25 MG tablet, Take 1 tablet by mouth Daily., Disp: 90 tablet, Rfl: 3  •  irbesartan (AVAPRO) 150 MG tablet, TAKE 1 TABLET BY MOUTH ONCE DAILY AT NIGHT, Disp: 90 tablet, Rfl: 0  •  Loratadine 10 MG capsule, Take  by mouth., Disp: , Rfl:   •  sildenafil (VIAGRA) 50 MG tablet, Take 1 tablet by mouth Daily As Needed for Erectile Dysfunction. Take 30 minutes to 4 hours prior to sexual activity., Disp: 6 tablet, Rfl: 12  •  simvastatin (ZOCOR) 40 MG tablet, Take 1 tablet by mouth Daily., Disp: 90 tablet, Rfl: 2    Allergies   Allergen Reactions   • Cyclobenzaprine Unknown - High Severity     Jerking movements.    • Prednisone Other (See Comments)     Made hyper and burning        Immunization History   Administered Date(s) Administered   • COVID-19 (MODERNA) 1st,2nd,3rd Dose Monovalent  "2021, 2021, 2021   • Tdap 2020       Family History   Problem Relation Age of Onset   • Stroke Father    • Dislocations Mother         Broke Hip / Replaced   • Cancer Sister         Pancriatic Cancer   • Cancer Brother         Lung Cancer       Cancer-related family history includes Cancer in his brother and sister.    Social History     Tobacco Use   • Smoking status: Former     Packs/day: 1.00     Years: 5.00     Additional pack years: 0.00     Total pack years: 5.00     Types: Cigarettes     Start date: 1971     Quit date: 1976     Years since quittin.8   • Smokeless tobacco: Never   Vaping Use   • Vaping Use: Never used   Substance Use Topics   • Alcohol use: No   • Drug use: No       ROS:    Review of Systems   Constitutional:  Positive for fatigue. Negative for chills and fever.   HENT:  Negative for congestion, drooling, ear discharge, rhinorrhea, sinus pressure and tinnitus.    Eyes:  Negative for photophobia, pain and discharge.   Respiratory:  Negative for apnea, choking and stridor.    Cardiovascular:  Negative for palpitations.   Gastrointestinal:  Negative for abdominal distention, abdominal pain and anal bleeding.   Endocrine: Negative for polydipsia and polyphagia.   Genitourinary:  Negative for decreased urine volume, flank pain and genital sores.   Musculoskeletal:  Negative for gait problem, neck pain and neck stiffness.   Skin:  Negative for color change, rash and wound.   Neurological:  Negative for tremors, seizures, syncope, facial asymmetry and speech difficulty.   Hematological:  Negative for adenopathy.   Psychiatric/Behavioral:  Negative for agitation, confusion, hallucinations and self-injury. The patient is not hyperactive.        Objective:    Vitals:    11/10/23 0859   BP: 151/88   Pulse: 55   Resp: 18   Temp: 98 °F (36.7 °C)   TempSrc: Infrared   SpO2: 98%   Weight: 86.6 kg (191 lb)   Height: 177.8 cm (70\")   PainSc: 0-No pain     Body mass index is " 27.41 kg/m².    ECOG  (0) Fully active, able to carry on all predisease performance without restriction    Physical Exam:  Physical Exam  Vitals and nursing note reviewed.   Constitutional:       General: He is not in acute distress.     Appearance: He is not diaphoretic.   HENT:      Head: Normocephalic and atraumatic.   Eyes:      General: No scleral icterus.        Right eye: No discharge.         Left eye: No discharge.      Conjunctiva/sclera: Conjunctivae normal.   Neck:      Thyroid: No thyromegaly.   Cardiovascular:      Rate and Rhythm: Normal rate and regular rhythm.      Heart sounds: Normal heart sounds.      No friction rub. No gallop.   Pulmonary:      Effort: Pulmonary effort is normal. No respiratory distress.      Breath sounds: No stridor. No wheezing.   Abdominal:      General: Bowel sounds are normal.      Palpations: Abdomen is soft. There is no mass.      Tenderness: There is no abdominal tenderness. There is no guarding or rebound.   Musculoskeletal:         General: No tenderness. Normal range of motion.      Cervical back: Normal range of motion and neck supple.   Lymphadenopathy:      Cervical: No cervical adenopathy.   Skin:     General: Skin is warm.      Findings: No erythema or rash.   Neurological:      Mental Status: He is alert and oriented to person, place, and time.      Motor: No abnormal muscle tone.   Psychiatric:         Behavior: Behavior normal.       RECENT LABS  WBC   Date Value Ref Range Status   11/10/2023 5.92 3.40 - 10.80 10*3/mm3 Final   09/19/2023 4.2 3.4 - 10.8 x10E3/uL Final     RBC   Date Value Ref Range Status   11/10/2023 4.82 4.14 - 5.80 10*6/mm3 Final   09/19/2023 4.59 4.14 - 5.80 x10E6/uL Final     Hemoglobin   Date Value Ref Range Status   11/10/2023 13.6 13.0 - 17.7 g/dL Final     Hematocrit   Date Value Ref Range Status   11/10/2023 42.3 37.5 - 51.0 % Final     MCV   Date Value Ref Range Status   11/10/2023 87.8 79.0 - 97.0 fL Final     MCH   Date Value Ref  Range Status   11/10/2023 28.2 26.6 - 33.0 pg Final     MCHC   Date Value Ref Range Status   11/10/2023 32.2 31.5 - 35.7 g/dL Final     RDW   Date Value Ref Range Status   11/10/2023 15.6 (H) 12.3 - 15.4 % Final     RDW-SD   Date Value Ref Range Status   11/10/2023 49.0 37.0 - 54.0 fl Final     MPV   Date Value Ref Range Status   11/10/2023 9.6 6.0 - 12.0 fL Final     Platelets   Date Value Ref Range Status   11/10/2023 214 140 - 450 10*3/mm3 Final     Neutrophil %   Date Value Ref Range Status   11/10/2023 76.4 (H) 42.7 - 76.0 % Final     Lymphocyte %   Date Value Ref Range Status   11/10/2023 7.1 (L) 19.6 - 45.3 % Final     Monocyte %   Date Value Ref Range Status   11/10/2023 13.0 (H) 5.0 - 12.0 % Final     Eosinophil %   Date Value Ref Range Status   11/10/2023 3.0 0.3 - 6.2 % Final     Basophil %   Date Value Ref Range Status   11/10/2023 0.5 0.0 - 1.5 % Final     Neutrophils, Absolute   Date Value Ref Range Status   11/10/2023 4.52 1.70 - 7.00 10*3/mm3 Final     Lymphocytes, Absolute   Date Value Ref Range Status   11/10/2023 0.42 (L) 0.70 - 3.10 10*3/mm3 Final     Monocytes, Absolute   Date Value Ref Range Status   11/10/2023 0.77 0.10 - 0.90 10*3/mm3 Final     Eosinophils, Absolute   Date Value Ref Range Status   11/10/2023 0.18 0.00 - 0.40 10*3/mm3 Final     Basophils, Absolute   Date Value Ref Range Status   11/10/2023 0.03 0.00 - 0.20 10*3/mm3 Final       Lab Results   Component Value Date    GLUCOSE 104 (H) 09/19/2023    BUN 12 09/19/2023    CREATININE 0.72 (L) 09/19/2023    EGFRIFNONA 93 04/26/2021    EGFRIFAFRI 108 04/26/2021    BCR 17 09/19/2023    K 4.9 09/19/2023    CO2 27 09/19/2023    CALCIUM 9.8 09/19/2023    PROTENTOTREF 6.6 09/19/2023    ALBUMIN 4.6 09/19/2023    LABIL2 2.3 (H) 09/19/2023    AST 20 09/19/2023    ALT 14 09/19/2023         Assessment & Plan   Lymphoma, unspecified body region, unspecified lymphoma type  - CBC & Differential      Lymphopenia, chronic and no other cytopenias  identified.  Rule drug induced, autoimmune      Plans      Reviewed differential diagnosis with patient  Labs have been ordered  Ultrasound of the abdomen to check for spleen size  Viral hepatitis panel, flow cytometry, CANDACE  Follow-up 4 weeks  All questions answered    Patient verbalized understanding and is in agreement of the above plan.              I spent 45 total minutes, face-to-face, caring for Daniel today. 90% of this time involved counseling and/or coordination of care as documented within this note.

## 2023-11-10 NOTE — PROGRESS NOTES
November 10, 2023    Hello, may I speak with Daniel Cagle?    My name is Phuong Salas      I am  with [facility]  [address] [place] [address] [phone].    Before we get started may I verify your date of birth? 1955    I am calling to officially welcome you to our practice and ask about your recent visit. Is this a good time to talk? no    Tell me about your visit with us. What things went well?  A My Chart message was sent to the patient.        We're always looking for ways to make our patients' experiences even better. Do you have recommendations on ways we may improve?  no    Overall were you satisfied with your first visit to our practice? yes       I appreciate you taking the time to speak with me today. Is there anything else I can do for you? no      Thank you, and have a great day.

## 2023-11-11 LAB — HAV AB SER QL IA: NEGATIVE

## 2023-11-13 LAB
ALBUMIN SERPL ELPH-MCNC: 3.9 G/DL (ref 2.9–4.4)
ALBUMIN/GLOB SERPL: 1.4 {RATIO} (ref 0.7–1.7)
ALPHA1 GLOB SERPL ELPH-MCNC: 0.2 G/DL (ref 0–0.4)
ALPHA2 GLOB SERPL ELPH-MCNC: 0.9 G/DL (ref 0.4–1)
ANA SER QL: NEGATIVE
B-GLOBULIN SERPL ELPH-MCNC: 1.1 G/DL (ref 0.7–1.3)
GAMMA GLOB SERPL ELPH-MCNC: 0.6 G/DL (ref 0.4–1.8)
GLOBULIN SER CALC-MCNC: 2.8 G/DL (ref 2.2–3.9)
LAB AP CASE REPORT: NORMAL
LABORATORY COMMENT REPORT: ABNORMAL
M PROTEIN SERPL ELPH-MCNC: 0.4 G/DL
PATH REPORT.FINAL DX SPEC: NORMAL
PROT PATTERN SERPL ELPH-IMP: ABNORMAL
PROT SERPL-MCNC: 6.7 G/DL (ref 6–8.5)

## 2023-11-14 ENCOUNTER — TELEPHONE (OUTPATIENT)
Dept: ONCOLOGY | Facility: CLINIC | Age: 68
End: 2023-11-14
Payer: MEDICARE

## 2023-11-14 DIAGNOSIS — E61.1 IRON DEFICIENCY: Primary | ICD-10-CM

## 2023-11-14 RX ORDER — FERROUS SULFATE 325(65) MG
325 TABLET ORAL
Qty: 30 TABLET | Refills: 6 | Status: SHIPPED | OUTPATIENT
Start: 2023-11-14

## 2023-11-14 NOTE — TELEPHONE ENCOUNTER
Called pt to let him know that he is iron deficient and that Dr. Espino would like for him to start oral iron supplements. I also told him that Dr. Espino has ordered a UA and GI referral. Pt verbalized understanding. Lab appt scheduled for UA. Pt stated that he had a colonoscopy this year, but could not remember the doctor's name, just that it was through [facility]. I told him I would fax the result to that office. Pt verbalized understanding. He asked about the abdominal US that was ordered at his last visit and said that it was not scheduled yet. I gave him the number for central scheduling. No further questions or needs at this time.

## 2023-11-14 NOTE — TELEPHONE ENCOUNTER
----- Message from Martha Espino MD sent at 11/14/2023  7:43 AM EST -----  He has iron ndeficency so begin oral iron supplements 325mg po daily # 30 refills 6  Check UA  Refer to GI  Keep his apt as discussed

## 2023-11-15 LAB
ANNOTATION COMMENT IMP: NORMAL
ASSESSMENT OF LEUKOCYTES: NORMAL
CLINICAL INFO: NORMAL
GATING STRATEGY: NORMAL
IMMUNOPHENOTYPING STUDY: NORMAL
LABORATORY COMMENT REPORT: NORMAL
PATH INTERP SPEC-IMP: NORMAL
PATHOLOGIST NAME: NORMAL
SPECIMEN SOURCE: NORMAL
VIABLE CELLS NFR SPEC: NORMAL %

## 2023-11-16 LAB — METHYLMALONATE SERPL-SCNC: 122 NMOL/L (ref 0–378)

## 2023-11-17 ENCOUNTER — LAB (OUTPATIENT)
Dept: LAB | Facility: HOSPITAL | Age: 68
End: 2023-11-17
Payer: MEDICARE

## 2023-11-17 DIAGNOSIS — E61.1 IRON DEFICIENCY: ICD-10-CM

## 2023-11-17 LAB
BILIRUB UR QL STRIP: NEGATIVE
CLARITY UR: CLEAR
COLOR UR: YELLOW
GLUCOSE UR STRIP-MCNC: NEGATIVE MG/DL
HGB UR QL STRIP.AUTO: NEGATIVE
KETONES UR QL STRIP: NEGATIVE
LEUKOCYTE ESTERASE UR QL STRIP.AUTO: NEGATIVE
NITRITE UR QL STRIP: NEGATIVE
PH UR STRIP.AUTO: 8 [PH] (ref 5–8)
PROT UR QL STRIP: NEGATIVE
SP GR UR STRIP: 1.01 (ref 1–1.03)
UROBILINOGEN UR QL STRIP: NORMAL

## 2023-11-17 PROCEDURE — 81003 URINALYSIS AUTO W/O SCOPE: CPT

## 2023-11-18 LAB — HBV CORE AB SERPL QL IA: NEGATIVE

## 2023-11-30 ENCOUNTER — HOSPITAL ENCOUNTER (OUTPATIENT)
Dept: ULTRASOUND IMAGING | Facility: HOSPITAL | Age: 68
Discharge: HOME | End: 2023-11-30
Admitting: INTERNAL MEDICINE
Payer: MEDICARE

## 2023-11-30 DIAGNOSIS — D72.819 LEUKOPENIA, UNSPECIFIED TYPE: ICD-10-CM

## 2023-11-30 DIAGNOSIS — R74.8 ELEVATED ALKALINE PHOSPHATASE LEVEL: ICD-10-CM

## 2023-11-30 PROCEDURE — 76705 ECHO EXAM OF ABDOMEN: CPT

## 2023-12-14 NOTE — PROGRESS NOTES
Hematology/Oncology Outpatient Follow Up    PATIENT NAME:Daniel Cagle  :1955  MRN: [id number]  PRIMARY CARE PHYSICIAN: Chapis Hernandez APRN  REFERRING PHYSICIAN: No ref. provider found    Chief Complaint   Patient presents with    Follow-up     lymphopenia        HISTORY OF PRESENT ILLNESS:       This is a 68-year-old male has referred secondary to lymphopenia.  Review of his CBCs from 3/20/2023 white count was 2.9 hemoglobin 13.6 platelets 404 differential as well as 3% neutrophils 16% lymphocytes and absolute lymphocyte count was 0.5.  Over the course of time his white count has remained normal at 5.9 hemoglobin 13.6 platelets 214 with 70% neutrophils lymphocytes 7.1 with absolute lymphocyte count of 0.4.     Patient complains of tiredness for a long time but no night sweats. He has had intentional weight loss.     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     Patient is      Family history with pancreatic cancer at age 58  Brother had  lung cancer    11/10/23: Patient had workup for lymphopenia.  Patient had additional labs including UA which was negative hepatitis B core antibody was negative, methylmalonic acid level was normal at 122, CANDACE was negative, SPEP with HECTOR showed monoclonal protein measured 0.4 g per DL reticulocyte count is normal at 0.8 haptoglobin is high at 259, folate was 16, ferritin was 12, B12 was normal at 551, LDH is 207, viral hepatitis B surface antigen was negative hepatitis B surface antibody was nonreactive, hepatitis A was negative hep C was nonreactive peripheral blood flow cytometry no mature B cells detected in the specimen no other significant immunophenotypic abnormality was seen  2023: Patient was told that he has iron deficiency and was initiated on oral iron supplementation to 25 mg p.o. daily with 6 refills.  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    Golfers Elbow from hammering       Past Surgical History:   Procedure Laterality Date    COLONOSCOPY  10/30/2017    Dr. Siddiqui    COLONOSCOPY  11/30/2000    adenoma polyp; Dr. White    COLONOSCOPY  08/18/2008    Dr. Marin; hyperplastic polyp    HERNIA REPAIR      TRIGGER POINT INJECTION  1994    Sacroiliac Joint         Current Outpatient Medications:     buPROPion XL (WELLBUTRIN XL) 150 MG 24 hr tablet, Take 1 tablet by mouth Daily., Disp: 90 tablet, Rfl: 1    citalopram (CeleXA) 40 MG tablet, Take 0.5 tablets by mouth 2 (Two) Times a Day. Taking one tablet daily. Breaking it in half to take half in the AM and half in the evening., Disp: 90 tablet, Rfl: 1    diclofenac (VOLTAREN) 50 MG EC tablet, Take 1 tablet by mouth 2 (Two) Times a Day., Disp: 60 tablet, Rfl: 12    ferrous sulfate 325 (65 FE) MG tablet, Take 1 tablet by mouth Daily With Breakfast., Disp: 30 tablet, Rfl: 6    hydroCHLOROthiazide (HYDRODIURIL) 25 MG tablet, Take 1 tablet by mouth Daily., Disp: 90 tablet, Rfl: 3    irbesartan (AVAPRO) 150 MG tablet, TAKE 1 TABLET BY MOUTH ONCE DAILY AT NIGHT, Disp: 90 tablet, Rfl: 0    Loratadine 10 MG capsule, Take  by mouth., Disp: , Rfl:     sildenafil (VIAGRA) 50 MG tablet, Take 1 tablet by mouth Daily As Needed for Erectile Dysfunction. Take 30 minutes to 4 hours prior to sexual activity., Disp: 6 tablet, Rfl: 12    simvastatin (ZOCOR) 40 MG tablet, Take  "1 tablet by mouth Daily., Disp: 90 tablet, Rfl: 2    Allergies   Allergen Reactions    Cyclobenzaprine Unknown - High Severity     Jerking movements.     Prednisone Other (See Comments)     Made hyper and burning        Family History   Problem Relation Age of Onset    Stroke Father     Dislocations Mother         Broke Hip / Replaced    Cancer Sister         Pancriatic Cancer    Cancer Brother         Lung Cancer       Cancer-related family history includes Cancer in his brother and sister.    Social History     Tobacco Use    Smoking status: Former     Packs/day: 1.00     Years: 5.00     Additional pack years: 0.00     Total pack years: 5.00     Types: Cigarettes     Start date: 1971     Quit date: 1976     Years since quittin.9    Smokeless tobacco: Never   Vaping Use    Vaping Use: Never used   Substance Use Topics    Alcohol use: No    Drug use: No     I have reviewed and confirmed the accuracy of the patient's history: Chief complaint, HPI, ROS, and Subjective as entered by the MA/LPN/RN. Martha Espino MD 12/15/23      SUBJECTIVE:     Patient does not have any new issues    REVIEW OF SYSTEMS:  Review of Systems   Musculoskeletal:  Positive for back pain.        Chronic back pain       OBJECTIVE:    Vitals:    12/15/23 1101   BP: 145/89   Pulse: 54   Resp: 18   Temp: 98 °F (36.7 °C)   TempSrc: Infrared   SpO2: 97%   Weight: 86.2 kg (190 lb)   Height: 177.8 cm (70\")   PainSc: 0-No pain     Body mass index is 27.26 kg/m².    ECOG  (0) Fully active, able to carry on all predisease performance without restriction    Physical Exam    No changes    RECENT LABS  WBC   Date Value Ref Range Status   12/15/2023 4.13 3.40 - 10.80 10*3/mm3 Final   2023 4.2 3.4 - 10.8 x10E3/uL Final     RBC   Date Value Ref Range Status   12/15/2023 4.52 4.14 - 5.80 10*6/mm3 Final   2023 4.59 4.14 - 5.80 x10E6/uL Final     Hemoglobin   Date Value Ref Range Status   12/15/2023 12.9 (L) 13.0 - 17.7 g/dL Final "     Hematocrit   Date Value Ref Range Status   12/15/2023 41.0 37.5 - 51.0 % Final     MCV   Date Value Ref Range Status   12/15/2023 90.7 79.0 - 97.0 fL Final     MCH   Date Value Ref Range Status   12/15/2023 28.5 26.6 - 33.0 pg Final     MCHC   Date Value Ref Range Status   12/15/2023 31.5 31.5 - 35.7 g/dL Final     RDW   Date Value Ref Range Status   12/15/2023 16.9 (H) 12.3 - 15.4 % Final     RDW-SD   Date Value Ref Range Status   12/15/2023 54.3 (H) 37.0 - 54.0 fl Final     MPV   Date Value Ref Range Status   12/15/2023 8.2 6.0 - 12.0 fL Final     Platelets   Date Value Ref Range Status   12/15/2023 363 140 - 450 10*3/mm3 Final     Neutrophil %   Date Value Ref Range Status   12/15/2023 70.9 42.7 - 76.0 % Final     Lymphocyte %   Date Value Ref Range Status   12/15/2023 8.7 (L) 19.6 - 45.3 % Final     Monocyte %   Date Value Ref Range Status   12/15/2023 16.0 (H) 5.0 - 12.0 % Final     Eosinophil %   Date Value Ref Range Status   12/15/2023 3.4 0.3 - 6.2 % Final     Basophil %   Date Value Ref Range Status   12/15/2023 1.0 0.0 - 1.5 % Final     Neutrophils, Absolute   Date Value Ref Range Status   12/15/2023 2.93 1.70 - 7.00 10*3/mm3 Final     Lymphocytes, Absolute   Date Value Ref Range Status   12/15/2023 0.36 (L) 0.70 - 3.10 10*3/mm3 Final     Monocytes, Absolute   Date Value Ref Range Status   12/15/2023 0.66 0.10 - 0.90 10*3/mm3 Final     Eosinophils, Absolute   Date Value Ref Range Status   12/15/2023 0.14 0.00 - 0.40 10*3/mm3 Final     Basophils, Absolute   Date Value Ref Range Status   12/15/2023 0.04 0.00 - 0.20 10*3/mm3 Final       Lab Results   Component Value Date    GLUCOSE 104 (H) 09/19/2023    BUN 12 09/19/2023    CREATININE 0.72 (L) 09/19/2023    EGFRIFNONA 93 04/26/2021    EGFRIFAFRI 108 04/26/2021    BCR 17 09/19/2023    K 4.9 09/19/2023    CO2 27 09/19/2023    CALCIUM 9.8 09/19/2023    PROTENTOTREF 6.7 11/10/2023    ALBUMIN 3.9 11/10/2023    LABIL2 1.4 11/10/2023    AST 20 09/19/2023    ALT  14 09/19/2023         Assessment & Plan     Iron deficiency  - CBC & Differential    Leukopenia, unspecified type  - CBC & Differential      ASSESSMENT:      Lymphoma, unspecified body region, unspecified lymphoma type  - CBC & Differential        Lymphopenia, chronic and no other cytopenias identified.  Rule drug induced, autoimmune.  Workup was unremarkable for any infection but he has depleted B cells on his flow cytometry.  Will track this over time.  Reviewed the possibilities with him  Monoclonal gammopathy, M protein 0.4 g per DL  Iron deficiency: Recommended to receive oral iron supplementation for 6 to 9 months.  UA was negative for hematuria.  He had EGD colonoscopy summer 2023.  I will call [facility]  Absence of B cells on flow cytometry        Plans        Complete his monoclonal workup  Continue oral iron supplementation  Reviewed differential diagnosis with patient  Labs have been ordered today as well  Ultrasound of the abdomen to check for spleen size did not show splenomegaly  Follow-up 2 months.  At that time we will repeat his CBC and possible peripheral blood flow cytometry to continue to monitor   All questions answered       Patient verbalized understanding and is in agreement of the above plan.               I spent 30 total minutes, face-to-face, caring for Daniel today. 90% of this time involved counseling and/or coordination of care as documented within this note.

## 2023-12-15 ENCOUNTER — OFFICE VISIT (OUTPATIENT)
Dept: ONCOLOGY | Facility: CLINIC | Age: 68
End: 2023-12-15
Payer: MEDICARE

## 2023-12-15 ENCOUNTER — LAB (OUTPATIENT)
Dept: LAB | Facility: HOSPITAL | Age: 68
End: 2023-12-15
Payer: MEDICARE

## 2023-12-15 VITALS
SYSTOLIC BLOOD PRESSURE: 145 MMHG | OXYGEN SATURATION: 97 % | HEART RATE: 54 BPM | WEIGHT: 190 LBS | TEMPERATURE: 98 F | HEIGHT: 70 IN | DIASTOLIC BLOOD PRESSURE: 89 MMHG | RESPIRATION RATE: 18 BRPM | BODY MASS INDEX: 27.2 KG/M2

## 2023-12-15 DIAGNOSIS — E61.1 IRON DEFICIENCY: Primary | ICD-10-CM

## 2023-12-15 DIAGNOSIS — D72.819 LEUKOPENIA, UNSPECIFIED TYPE: ICD-10-CM

## 2023-12-15 PROBLEM — Z87.2 HISTORY OF COLD URTICARIA: Status: ACTIVE | Noted: 2023-12-15

## 2023-12-15 LAB
ALBUMIN SERPL-MCNC: 4.4 G/DL (ref 3.5–5.2)
ALBUMIN/GLOB SERPL: 2.2 G/DL
ALP SERPL-CCNC: 115 U/L (ref 39–117)
ALT SERPL W P-5'-P-CCNC: 23 U/L (ref 1–41)
ANION GAP SERPL CALCULATED.3IONS-SCNC: 8 MMOL/L (ref 5–15)
AST SERPL-CCNC: 19 U/L (ref 1–40)
BASOPHILS # BLD AUTO: 0.04 10*3/MM3 (ref 0–0.2)
BASOPHILS NFR BLD AUTO: 1 % (ref 0–1.5)
BILIRUB SERPL-MCNC: 0.3 MG/DL (ref 0–1.2)
BUN SERPL-MCNC: 11 MG/DL (ref 8–23)
BUN/CREAT SERPL: 15.5 (ref 7–25)
CALCIUM SPEC-SCNC: 9 MG/DL (ref 8.6–10.5)
CHLORIDE SERPL-SCNC: 98 MMOL/L (ref 98–107)
CO2 SERPL-SCNC: 29 MMOL/L (ref 22–29)
CREAT SERPL-MCNC: 0.71 MG/DL (ref 0.76–1.27)
DEPRECATED RDW RBC AUTO: 54.3 FL (ref 37–54)
EGFRCR SERPLBLD CKD-EPI 2021: 99.9 ML/MIN/1.73
EOSINOPHIL # BLD AUTO: 0.14 10*3/MM3 (ref 0–0.4)
EOSINOPHIL NFR BLD AUTO: 3.4 % (ref 0.3–6.2)
ERYTHROCYTE [DISTWIDTH] IN BLOOD BY AUTOMATED COUNT: 16.9 % (ref 12.3–15.4)
GLOBULIN UR ELPH-MCNC: 2 GM/DL
GLUCOSE SERPL-MCNC: 95 MG/DL (ref 65–99)
HCT VFR BLD AUTO: 41 % (ref 37.5–51)
HGB BLD-MCNC: 12.9 G/DL (ref 13–17.7)
HOLD SPECIMEN: NORMAL
IGA1 MFR SER: <50 MG/DL (ref 70–400)
IGG1 SER-MCNC: 572 MG/DL (ref 700–1600)
IGM SERPL-MCNC: <25 MG/DL (ref 40–230)
LYMPHOCYTES # BLD AUTO: 0.36 10*3/MM3 (ref 0.7–3.1)
LYMPHOCYTES NFR BLD AUTO: 8.7 % (ref 19.6–45.3)
MCH RBC QN AUTO: 28.5 PG (ref 26.6–33)
MCHC RBC AUTO-ENTMCNC: 31.5 G/DL (ref 31.5–35.7)
MCV RBC AUTO: 90.7 FL (ref 79–97)
MONOCYTES # BLD AUTO: 0.66 10*3/MM3 (ref 0.1–0.9)
MONOCYTES NFR BLD AUTO: 16 % (ref 5–12)
NEUTROPHILS NFR BLD AUTO: 2.93 10*3/MM3 (ref 1.7–7)
NEUTROPHILS NFR BLD AUTO: 70.9 % (ref 42.7–76)
PLATELET # BLD AUTO: 363 10*3/MM3 (ref 140–450)
PMV BLD AUTO: 8.2 FL (ref 6–12)
POTASSIUM SERPL-SCNC: 4.6 MMOL/L (ref 3.5–5.2)
PROT SERPL-MCNC: 6.4 G/DL (ref 6–8.5)
RBC # BLD AUTO: 4.52 10*6/MM3 (ref 4.14–5.8)
SODIUM SERPL-SCNC: 135 MMOL/L (ref 136–145)
WBC NRBC COR # BLD AUTO: 4.13 10*3/MM3 (ref 3.4–10.8)

## 2023-12-15 PROCEDURE — 80053 COMPREHEN METABOLIC PANEL: CPT | Performed by: INTERNAL MEDICINE

## 2023-12-15 PROCEDURE — 85025 COMPLETE CBC W/AUTO DIFF WBC: CPT

## 2023-12-15 PROCEDURE — 36415 COLL VENOUS BLD VENIPUNCTURE: CPT

## 2023-12-18 LAB
ALBUMIN SERPL ELPH-MCNC: 3.9 G/DL (ref 2.9–4.4)
ALBUMIN/GLOB SERPL: 1.5 {RATIO} (ref 0.7–1.7)
ALPHA1 GLOB SERPL ELPH-MCNC: 0.2 G/DL (ref 0–0.4)
ALPHA2 GLOB SERPL ELPH-MCNC: 0.9 G/DL (ref 0.4–1)
B-GLOBULIN SERPL ELPH-MCNC: 1 G/DL (ref 0.7–1.3)
GAMMA GLOB SERPL ELPH-MCNC: 0.5 G/DL (ref 0.4–1.8)
GLOBULIN SER CALC-MCNC: 2.6 G/DL (ref 2.2–3.9)
IGA SERPL-MCNC: 40 MG/DL (ref 61–437)
IGG SERPL-MCNC: 581 MG/DL (ref 603–1613)
IGM SERPL-MCNC: 5 MG/DL (ref 20–172)
KAPPA LC FREE SER-MCNC: 8.9 MG/L (ref 3.3–19.4)
KAPPA LC FREE/LAMBDA FREE SER: 0.52 {RATIO} (ref 0.26–1.65)
LABORATORY COMMENT REPORT: ABNORMAL
LAMBDA LC FREE SERPL-MCNC: 17 MG/L (ref 5.7–26.3)
M PROTEIN SERPL ELPH-MCNC: 0.3 G/DL
PROT PATTERN SERPL ELPH-IMP: ABNORMAL
PROT PATTERN SERPL IFE-IMP: ABNORMAL
PROT SERPL-MCNC: 6.5 G/DL (ref 6–8.5)

## 2023-12-22 ENCOUNTER — TELEPHONE (OUTPATIENT)
Dept: ONCOLOGY | Facility: CLINIC | Age: 68
End: 2023-12-22
Payer: MEDICARE

## 2023-12-22 DIAGNOSIS — I10 HYPERTENSION, BENIGN: ICD-10-CM

## 2023-12-22 DIAGNOSIS — D47.2 GAMMOPATHY: Primary | ICD-10-CM

## 2023-12-22 RX ORDER — HYDROCHLOROTHIAZIDE 12.5 MG/1
12.5 TABLET ORAL DAILY
Qty: 90 TABLET | Refills: 0 | OUTPATIENT
Start: 2023-12-22

## 2023-12-22 NOTE — TELEPHONE ENCOUNTER
----- Message from Martha Tiffany Espino MD sent at 12/19/2023  8:40 AM EST -----  24 hour urine for UPEP with HECTOR for gammopathy. Let him know we are looking for abnormal proteins in his urine too since he has some in his blood to decide on if we need more testing to be performed at this time.

## 2023-12-22 NOTE — TELEPHONE ENCOUNTER
Attempted to call pt to let him know that Dr. Espino has ordered a 24 hour urine to check for abnormal proteins. No answer, VM full.

## 2023-12-23 DIAGNOSIS — I10 HYPERTENSION, BENIGN: ICD-10-CM

## 2023-12-26 RX ORDER — HYDROCHLOROTHIAZIDE 12.5 MG/1
12.5 TABLET ORAL DAILY
Qty: 90 TABLET | Refills: 0 | OUTPATIENT
Start: 2023-12-26

## 2024-01-02 ENCOUNTER — TELEPHONE (OUTPATIENT)
Dept: FAMILY MEDICINE CLINIC | Facility: CLINIC | Age: 69
End: 2024-01-02

## 2024-01-02 ENCOUNTER — OFFICE VISIT (OUTPATIENT)
Dept: FAMILY MEDICINE CLINIC | Facility: CLINIC | Age: 69
End: 2024-01-02
Payer: MEDICARE

## 2024-01-02 VITALS
DIASTOLIC BLOOD PRESSURE: 78 MMHG | WEIGHT: 185 LBS | TEMPERATURE: 97.3 F | RESPIRATION RATE: 18 BRPM | OXYGEN SATURATION: 100 % | HEART RATE: 45 BPM | BODY MASS INDEX: 26.48 KG/M2 | SYSTOLIC BLOOD PRESSURE: 112 MMHG | HEIGHT: 70 IN

## 2024-01-02 DIAGNOSIS — K44.9 DIAPHRAGMATIC HERNIA WITHOUT OBSTRUCTION OR GANGRENE: ICD-10-CM

## 2024-01-02 DIAGNOSIS — Z09 HOSPITAL DISCHARGE FOLLOW-UP: Primary | ICD-10-CM

## 2024-01-02 DIAGNOSIS — K44.9 HIATAL HERNIA: ICD-10-CM

## 2024-01-02 RX ORDER — PANTOPRAZOLE SODIUM 40 MG/1
40 TABLET, DELAYED RELEASE ORAL DAILY
Qty: 30 TABLET | Refills: 1 | Status: SHIPPED | OUTPATIENT
Start: 2024-01-02

## 2024-01-02 NOTE — PROGRESS NOTES
Office Note     Name: Daniel Cagle    : 1955     MRN: [id number]     Chief Complaint  Hospital Follow Up Visit    Subjective     Daniel Cagle presents to [facility] for a follow up visit for  Hospital follow up.  He was rushed to the hospital via ambulance while eating lunch. He was eating and all of a sudden when he swallowed, he felt an intense pain in his chest. He tried to drink water and walk around and was very nauseous. He had foamy mucous come up and was drenched in sweat.    He was seen recently at [facility] in [place]. He was admitted -. He states he was there with a hiatal hernia. He was given morphine and tramadol along with a GI cocktail. They did a CT scan, endoscopy and labs. He was told to come home and follow up with GI and his PCP.   Will obtain records from the facility.     He previously reported that white meat chicken would occasionally become stuck.  He has not had any problems with food until this recent episode.   He reports continued feeling of  uneasiness and is hesitant to eat anything.       Diagnosed with a hiatal hernia  - He is unsure of the specific type.   He had an EGD and colonoscopy completed in 2023 with I.    Referral placed.     He takes prilosec nightly.       History of Present Illness      Current Outpatient Medications:   •  buPROPion XL (WELLBUTRIN XL) 150 MG 24 hr tablet, Take 1 tablet by mouth Daily., Disp: 90 tablet, Rfl: 1  •  citalopram (CeleXA) 40 MG tablet, Take 0.5 tablets by mouth 2 (Two) Times a Day. Taking one tablet daily. Breaking it in half to take half in the AM and half in the evening., Disp: 90 tablet, Rfl: 1  •  diclofenac (VOLTAREN) 50 MG EC tablet, Take 1 tablet by mouth 2 (Two) Times a Day., Disp: 60 tablet, Rfl: 12  •  ferrous sulfate 325 (65 FE) MG tablet, Take 1 tablet by mouth Daily With Breakfast., Disp: 30 tablet, Rfl: 6  •  hydroCHLOROthiazide (HYDRODIURIL) 25 MG tablet,  Take 1 tablet by mouth Daily., Disp: 90 tablet, Rfl: 3  •  irbesartan (AVAPRO) 150 MG tablet, TAKE 1 TABLET BY MOUTH ONCE DAILY AT NIGHT, Disp: 90 tablet, Rfl: 0  •  Loratadine 10 MG capsule, Take  by mouth., Disp: , Rfl:   •  sildenafil (VIAGRA) 50 MG tablet, Take 1 tablet by mouth Daily As Needed for Erectile Dysfunction. Take 30 minutes to 4 hours prior to sexual activity., Disp: 6 tablet, Rfl: 12  •  simvastatin (ZOCOR) 40 MG tablet, Take 1 tablet by mouth Daily., Disp: 90 tablet, Rfl: 2  •  pantoprazole (PROTONIX) 40 MG EC tablet, Take 1 tablet by mouth Daily., Disp: 30 tablet, Rfl: 1    Allergies   Allergen Reactions   • Cyclobenzaprine Unknown - High Severity     Jerking movements.    • Prednisone Other (See Comments)     Made hyper and burning        Past Surgical History:   Procedure Laterality Date   • COLONOSCOPY  10/30/2017    Dr. Siddiqui   • COLONOSCOPY  11/30/2000    adenoma polyp; Dr. White   • COLONOSCOPY  08/18/2008    Dr. Marin; hyperplastic polyp   • HERNIA REPAIR     • TRIGGER POINT INJECTION  1994    Sacroiliac Joint        Family History   Problem Relation Age of Onset   • Stroke Father    • Dislocations Mother         Broke Hip / Replaced   • Cancer Sister         Pancriatic Cancer   • Cancer Brother         Lung Cancer            3/20/2023     8:37 AM   PHQ-2/PHQ-9 Depression Screening   Little Interest or Pleasure in Doing Things 0-->not at all   Feeling Down, Depressed or Hopeless 0-->not at all   PHQ-9: Brief Depression Severity Measure Score 0       Review of Systems   Constitutional:  Negative for chills, fatigue and fever.   HENT: Negative.     Respiratory:  Negative for cough, shortness of breath and wheezing.    Cardiovascular:  Negative for chest pain, palpitations and leg swelling.   Gastrointestinal:  Positive for GERD and indigestion. Negative for constipation, diarrhea, nausea and vomiting.       Objective     /78 (BP Location: Right arm, Patient Position: Sitting, Cuff  "Size: Adult)   Pulse (!) 45   Temp 97.3 °F (36.3 °C) (Temporal)   Resp 18   Ht 177.8 cm (70\")   Wt 83.9 kg (185 lb)   SpO2 100%   BMI 26.54 kg/m²     BP Readings from Last 2 Encounters:   01/02/24 112/78   12/15/23 145/89       Wt Readings from Last 2 Encounters:   01/02/24 83.9 kg (185 lb)   12/15/23 86.2 kg (190 lb)        Physical Exam  Vitals and nursing note reviewed.   Constitutional:       General: He is not in acute distress.     Appearance: Normal appearance. He is well-groomed and overweight. He is not ill-appearing, toxic-appearing or diaphoretic.   HENT:      Head: Normocephalic and atraumatic.   Eyes:      General: Lids are normal. Vision grossly intact. Gaze aligned appropriately.      Extraocular Movements: Extraocular movements intact.      Pupils: Pupils are equal, round, and reactive to light.   Neck:      Vascular: No carotid bruit.   Cardiovascular:      Rate and Rhythm: Normal rate and regular rhythm.      Heart sounds: Normal heart sounds. No murmur heard.  Pulmonary:      Effort: Pulmonary effort is normal.      Breath sounds: Normal breath sounds and air entry.   Abdominal:      General: Abdomen is flat. Bowel sounds are normal. There is no distension.      Palpations: Abdomen is soft. There is no mass.      Tenderness: There is abdominal tenderness in the epigastric area. There is no right CVA tenderness, left CVA tenderness, guarding or rebound.      Hernia: A hernia is present.          Comments: Hernia noted with abdominal tensing.  Diastasis recti.   Musculoskeletal:      Cervical back: Normal range of motion and neck supple.   Skin:     General: Skin is warm and dry.   Neurological:      General: No focal deficit present.      Mental Status: He is alert and oriented to person, place, and time. Mental status is at baseline.   Psychiatric:         Attention and Perception: Attention and perception normal.         Mood and Affect: Mood normal.         Behavior: Behavior normal. " Behavior is cooperative.       Physical Exam   Abdomen   Abdomen comments: Hernia noted with abdominal tensing.  Diastasis recti.      Result Review :{ Labs  Result Review  Imaging  Med Tab  Media   Assessment and Plan         Problems Addressed this Visit          Gastrointestinal Abdominal     Diaphragmatic hernia without obstruction or gangrene    Relevant Medications    pantoprazole (PROTONIX) 40 MG EC tablet    Other Relevant Orders    Ambulatory Referral to Gastroenterology (Completed)     Other Visit Diagnoses       Hospital discharge follow-up    -  Primary    Hiatal hernia        Relevant Medications    pantoprazole (PROTONIX) 40 MG EC tablet    BMI 27.0-27.9,adult              Diagnoses         Codes Comments    Hospital discharge follow-up    -  Primary ICD-10-CM: Z09  ICD-9-CM: V67.59     Hiatal hernia     ICD-10-CM: K44.9  ICD-9-CM: 553.3     BMI 27.0-27.9,adult     ICD-10-CM: Z68.27  ICD-9-CM: V85.23     Diaphragmatic hernia without obstruction or gangrene     ICD-10-CM: K44.9  ICD-9-CM: 553.3            Procedures    Problem List Items Addressed This Visit          Gastrointestinal Abdominal     Diaphragmatic hernia without obstruction or gangrene    Relevant Medications    pantoprazole (PROTONIX) 40 MG EC tablet    Other Relevant Orders    Ambulatory Referral to Gastroenterology (Completed)     Other Visit Diagnoses       Hospital discharge follow-up    -  Primary    Hiatal hernia        Relevant Medications    pantoprazole (PROTONIX) 40 MG EC tablet    BMI 27.0-27.9,adult                 {Instructions Charge Capture  Follow-up Communications     Wrapup Tab  No follow-ups on file.     There are no Patient Instructions on file for this visit.   Patient was given instructions and counseling regarding his condition or for health maintenance advice. Please see specific information pulled into the AVS if appropriate.  Hand hygiene was performed during entrance to exam room and following assessment  of patient. This document is intended for medical expert use only.     EMR Dragon/Transcription disclaimer:   Much of this encounter note is an electronic transcription/translation of spoken language to printed text. The electronic translation of spoken language may permit erroneous, or at times, nonsensical words or phrases to be inadvertently transcribed.      THU Mccarthy, FNP-C  [address]  [facility]  [address] [place] [address] [address]  [facility] [address] [phone]

## 2024-01-02 NOTE — TELEPHONE ENCOUNTER
----- Message from THU Mccarthy sent at 1/2/2024 11:56 AM EST -----  Regarding: Records  Please call [facility] in [place], [place] for records of admittance from 12/27-12/29.

## 2024-01-04 DIAGNOSIS — K44.9 HIATAL HERNIA: Primary | ICD-10-CM

## 2024-01-04 DIAGNOSIS — I51.7 CARDIOMEGALY: Primary | ICD-10-CM

## 2024-01-04 PROBLEM — J98.6 ELEVATED HEMIDIAPHRAGM: Status: ACTIVE | Noted: 2024-01-04

## 2024-01-04 PROBLEM — K76.89 SIMPLE HEPATIC CYST: Status: ACTIVE | Noted: 2024-01-04

## 2024-01-04 PROBLEM — N43.3 BILATERAL HYDROCELE: Status: ACTIVE | Noted: 2024-01-04

## 2024-01-04 PROBLEM — I70.0 ATHEROSCLEROSIS OF ABDOMINAL AORTA: Status: ACTIVE | Noted: 2024-01-04

## 2024-01-05 ENCOUNTER — OFFICE VISIT (OUTPATIENT)
Dept: SURGERY | Facility: CLINIC | Age: 69
End: 2024-01-05
Payer: MEDICARE

## 2024-01-05 VITALS
WEIGHT: 184.2 LBS | OXYGEN SATURATION: 96 % | HEART RATE: 66 BPM | TEMPERATURE: 97.8 F | BODY MASS INDEX: 26.37 KG/M2 | HEIGHT: 70 IN | SYSTOLIC BLOOD PRESSURE: 114 MMHG | DIASTOLIC BLOOD PRESSURE: 79 MMHG

## 2024-01-05 DIAGNOSIS — K44.9 PARAESOPHAGEAL HERNIA: Primary | ICD-10-CM

## 2024-01-05 PROCEDURE — 1159F MED LIST DOCD IN RCRD: CPT | Performed by: STUDENT IN AN ORGANIZED HEALTH CARE EDUCATION/TRAINING PROGRAM

## 2024-01-05 PROCEDURE — 99204 OFFICE O/P NEW MOD 45 MIN: CPT | Performed by: STUDENT IN AN ORGANIZED HEALTH CARE EDUCATION/TRAINING PROGRAM

## 2024-01-05 PROCEDURE — 3074F SYST BP LT 130 MM HG: CPT | Performed by: STUDENT IN AN ORGANIZED HEALTH CARE EDUCATION/TRAINING PROGRAM

## 2024-01-05 PROCEDURE — 1160F RVW MEDS BY RX/DR IN RCRD: CPT | Performed by: STUDENT IN AN ORGANIZED HEALTH CARE EDUCATION/TRAINING PROGRAM

## 2024-01-05 PROCEDURE — 3078F DIAST BP <80 MM HG: CPT | Performed by: STUDENT IN AN ORGANIZED HEALTH CARE EDUCATION/TRAINING PROGRAM

## 2024-01-05 NOTE — H&P (VIEW-ONLY)
"Subjective   Daniel Cagle is a 68 y.o. male.   Chief Complaint   Patient presents with    Hernia     NP ref THU Mccarthy, Hiatal hernia; problems for many years, taken PPI's for years, occasional issues with swallowing. Increased issues starting last week patient, had food stuck in esophagus taken to hospital via ambulance in [place]     /79 (BP Location: Left arm, Patient Position: Sitting, Cuff Size: Large Adult)   Pulse 66   Temp 97.8 °F (36.6 °C) (Infrared)   Ht 177.8 cm (70\")   Wt 83.6 kg (184 lb 3.2 oz)   SpO2 96%   BMI 26.43 kg/m²     HISTORY OF PRESENT ILLNESS:  68 year old man here for evaluation of his paraesophageal hernia. Had known about for about 15 years. Main issues is with certain foods sticking in his esophagus. This has been progressively getting worse. Last week he was in [place] and had to to the hospital for EGD and food bolus removal. No nausea or emesis. Has some reflux but this is controlled with PPIs. No bloating or belching      Outpatient Encounter Medications as of 1/5/2024   Medication Sig Dispense Refill    buPROPion XL (WELLBUTRIN XL) 150 MG 24 hr tablet Take 1 tablet by mouth Daily. 90 tablet 1    citalopram (CeleXA) 40 MG tablet Take 0.5 tablets by mouth 2 (Two) Times a Day. Taking one tablet daily. Breaking it in half to take half in the AM and half in the evening. 90 tablet 1    diclofenac (VOLTAREN) 50 MG EC tablet Take 1 tablet by mouth 2 (Two) Times a Day. 60 tablet 12    ferrous sulfate 325 (65 FE) MG tablet Take 1 tablet by mouth Daily With Breakfast. 30 tablet 6    hydroCHLOROthiazide (HYDRODIURIL) 25 MG tablet Take 1 tablet by mouth Daily. 90 tablet 3    irbesartan (AVAPRO) 150 MG tablet TAKE 1 TABLET BY MOUTH ONCE DAILY AT NIGHT 90 tablet 0    Loratadine 10 MG capsule Take  by mouth.      pantoprazole (PROTONIX) 40 MG EC tablet Take 1 tablet by mouth Daily. 30 tablet 1    sildenafil (VIAGRA) 50 MG tablet Take 1 tablet by mouth Daily As Needed for " Erectile Dysfunction. Take 30 minutes to 4 hours prior to sexual activity. 6 tablet 12    simvastatin (ZOCOR) 40 MG tablet Take 1 tablet by mouth Daily. 90 tablet 2     No facility-administered encounter medications on file as of 2024.     Past Medical History:   Diagnosis Date    Adult situational stress disorder     Allergic rhinitis     Anemia     Ankle sprain childhood    Arthritis of back 1984    Lower Spine Degen    Colon polyp 15 YRS AGO    NONE LAST SCOPE    Congenital hiatus hernia     Diverticulitis of colon DECADES AGO    VERY LIMITED    Diverticulosis     Dysphagia     ED (erectile dysfunction)     Hyperlipidemia     Hypertension     Low back strain     pulled lifting    Lumbosacral disc disease     ruptured disc L5-S1    Olecranon bursitis     Rotator cuff syndrome     pulled throwing softball    Tennis elbow  ????    Golfers Elbow from hammering     Past Surgical History:   Procedure Laterality Date    COLONOSCOPY  10/30/2017    Dr. Siddiqui    COLONOSCOPY  2000    adenoma polyp; Dr. White    COLONOSCOPY  2008    Dr. Marin; hyperplastic polyp    HERNIA REPAIR      TRIGGER POINT INJECTION      Sacroiliac Joint     Family History   Problem Relation Age of Onset    Stroke Father     Hypertension Father         CEREBRAL HEMORAGE    Dislocations Mother         Broke Hip / Replaced    Cancer Sister         Pancriatic Cancer    Cancer Brother         Lung Cancer    Alcohol abuse Brother        Social History     Tobacco Use    Smoking status: Former     Packs/day: 1.00     Years: 5.00     Additional pack years: 0.00     Total pack years: 5.00     Types: Cigarettes     Start date: 1971     Quit date: 1976     Years since quittin.0    Smokeless tobacco: Never   Vaping Use    Vaping Use: Never used   Substance Use Topics    Alcohol use: No    Drug use: No     Cyclobenzaprine and Prednisone    Review of Systems   Gastrointestinal:  Positive for GERD.        Objective     Physical Exam  Cardiovascular:      Rate and Rhythm: Normal rate.   Pulmonary:      Effort: Pulmonary effort is normal.   Abdominal:      General: Abdomen is flat.      Palpations: Abdomen is soft.   Neurological:      Mental Status: He is alert.           Assessment & Plan   Diagnoses and all orders for this visit:    1. Paraesophageal hernia (Primary)  -     [place] Upper GI Single Contrast Without KUB; Future  -     Case Request    -Images unavailable for review but I reviewed the CT report  -From the report sounds like he has a large paraesophageal hernia with stomach involvement  -We discussed the pathophysiology of, natural history of, and indications for operation for his hiatal hernia  -Given that he is fairly symptomatic and there is a risk of volvulus, given that he is healthy, I would recommend proceeding with work-up and repair  -will plan for EGD and UGI to evaluate anatomy  -Will see him back after this discuss laparoscopic paraesophageal hernia repair    Paul Delgado MD  1/5/2024  10:57 AM EST

## 2024-01-08 ENCOUNTER — ANESTHESIA (OUTPATIENT)
Dept: GASTROENTEROLOGY | Facility: HOSPITAL | Age: 69
End: 2024-01-08
Payer: MEDICARE

## 2024-01-08 ENCOUNTER — ANESTHESIA EVENT (OUTPATIENT)
Dept: GASTROENTEROLOGY | Facility: HOSPITAL | Age: 69
End: 2024-01-08
Payer: MEDICARE

## 2024-01-08 ENCOUNTER — HOSPITAL ENCOUNTER (OUTPATIENT)
Facility: HOSPITAL | Age: 69
Setting detail: HOSPITAL OUTPATIENT SURGERY
Discharge: HOME | End: 2024-01-08
Attending: STUDENT IN AN ORGANIZED HEALTH CARE EDUCATION/TRAINING PROGRAM | Admitting: STUDENT IN AN ORGANIZED HEALTH CARE EDUCATION/TRAINING PROGRAM
Payer: MEDICARE

## 2024-01-08 VITALS
BODY MASS INDEX: 26 KG/M2 | RESPIRATION RATE: 22 BRPM | HEART RATE: 68 BPM | HEIGHT: 70 IN | OXYGEN SATURATION: 94 % | DIASTOLIC BLOOD PRESSURE: 74 MMHG | WEIGHT: 181.6 LBS | SYSTOLIC BLOOD PRESSURE: 118 MMHG | TEMPERATURE: 98.5 F

## 2024-01-08 PROCEDURE — 25010000002 DEXAMETHASONE PER 1 MG: Performed by: NURSE ANESTHETIST, CERTIFIED REGISTERED

## 2024-01-08 PROCEDURE — 25010000002 PROPOFOL 200 MG/20ML EMULSION: Performed by: NURSE ANESTHETIST, CERTIFIED REGISTERED

## 2024-01-08 PROCEDURE — 25010000002 SUCCINYLCHOLINE PER 20 MG: Performed by: NURSE ANESTHETIST, CERTIFIED REGISTERED

## 2024-01-08 PROCEDURE — 25810000003 SODIUM CHLORIDE 0.9 % SOLUTION: Performed by: STUDENT IN AN ORGANIZED HEALTH CARE EDUCATION/TRAINING PROGRAM

## 2024-01-08 PROCEDURE — 93005 ELECTROCARDIOGRAM TRACING: CPT | Performed by: STUDENT IN AN ORGANIZED HEALTH CARE EDUCATION/TRAINING PROGRAM

## 2024-01-08 PROCEDURE — 25010000002 ONDANSETRON PER 1 MG: Performed by: NURSE ANESTHETIST, CERTIFIED REGISTERED

## 2024-01-08 PROCEDURE — 43235 EGD DIAGNOSTIC BRUSH WASH: CPT | Performed by: STUDENT IN AN ORGANIZED HEALTH CARE EDUCATION/TRAINING PROGRAM

## 2024-01-08 PROCEDURE — 25810000003 SODIUM CHLORIDE 0.9 % SOLUTION: Performed by: NURSE ANESTHETIST, CERTIFIED REGISTERED

## 2024-01-08 RX ORDER — LABETALOL HYDROCHLORIDE 5 MG/ML
5 INJECTION, SOLUTION INTRAVENOUS
Status: DISCONTINUED | OUTPATIENT
Start: 2024-01-08 | End: 2024-01-08 | Stop reason: HOSPADM

## 2024-01-08 RX ORDER — SUCCINYLCHOLINE CHLORIDE 20 MG/ML
INJECTION INTRAMUSCULAR; INTRAVENOUS AS NEEDED
Status: DISCONTINUED | OUTPATIENT
Start: 2024-01-08 | End: 2024-01-08 | Stop reason: SURG

## 2024-01-08 RX ORDER — SODIUM CHLORIDE 9 MG/ML
INJECTION, SOLUTION INTRAVENOUS CONTINUOUS PRN
Status: DISCONTINUED | OUTPATIENT
Start: 2024-01-08 | End: 2024-01-08 | Stop reason: SURG

## 2024-01-08 RX ORDER — ROCURONIUM BROMIDE 10 MG/ML
INJECTION, SOLUTION INTRAVENOUS AS NEEDED
Status: DISCONTINUED | OUTPATIENT
Start: 2024-01-08 | End: 2024-01-08 | Stop reason: SURG

## 2024-01-08 RX ORDER — ONDANSETRON 2 MG/ML
INJECTION INTRAMUSCULAR; INTRAVENOUS AS NEEDED
Status: DISCONTINUED | OUTPATIENT
Start: 2024-01-08 | End: 2024-01-08 | Stop reason: SURG

## 2024-01-08 RX ORDER — DIPHENHYDRAMINE HYDROCHLORIDE 50 MG/ML
12.5 INJECTION INTRAMUSCULAR; INTRAVENOUS
Status: DISCONTINUED | OUTPATIENT
Start: 2024-01-08 | End: 2024-01-08 | Stop reason: HOSPADM

## 2024-01-08 RX ORDER — ONDANSETRON 2 MG/ML
4 INJECTION INTRAMUSCULAR; INTRAVENOUS ONCE AS NEEDED
Status: DISCONTINUED | OUTPATIENT
Start: 2024-01-08 | End: 2024-01-08 | Stop reason: HOSPADM

## 2024-01-08 RX ORDER — SODIUM CHLORIDE 9 MG/ML
10 INJECTION, SOLUTION INTRAVENOUS ONCE
Status: COMPLETED | OUTPATIENT
Start: 2024-01-08 | End: 2024-01-08

## 2024-01-08 RX ORDER — HYDRALAZINE HYDROCHLORIDE 20 MG/ML
5 INJECTION INTRAMUSCULAR; INTRAVENOUS
Status: DISCONTINUED | OUTPATIENT
Start: 2024-01-08 | End: 2024-01-08 | Stop reason: HOSPADM

## 2024-01-08 RX ORDER — MEPERIDINE HYDROCHLORIDE 25 MG/ML
12.5 INJECTION INTRAMUSCULAR; INTRAVENOUS; SUBCUTANEOUS
Status: DISCONTINUED | OUTPATIENT
Start: 2024-01-08 | End: 2024-01-08 | Stop reason: HOSPADM

## 2024-01-08 RX ORDER — LIDOCAINE HYDROCHLORIDE 20 MG/ML
INJECTION, SOLUTION INFILTRATION; PERINEURAL AS NEEDED
Status: DISCONTINUED | OUTPATIENT
Start: 2024-01-08 | End: 2024-01-08 | Stop reason: SURG

## 2024-01-08 RX ORDER — PROPOFOL 10 MG/ML
INJECTION, EMULSION INTRAVENOUS AS NEEDED
Status: DISCONTINUED | OUTPATIENT
Start: 2024-01-08 | End: 2024-01-08 | Stop reason: SURG

## 2024-01-08 RX ORDER — IPRATROPIUM BROMIDE AND ALBUTEROL SULFATE 2.5; .5 MG/3ML; MG/3ML
3 SOLUTION RESPIRATORY (INHALATION) ONCE AS NEEDED
Status: DISCONTINUED | OUTPATIENT
Start: 2024-01-08 | End: 2024-01-08 | Stop reason: HOSPADM

## 2024-01-08 RX ORDER — DEXAMETHASONE SODIUM PHOSPHATE 4 MG/ML
INJECTION, SOLUTION INTRA-ARTICULAR; INTRALESIONAL; INTRAMUSCULAR; INTRAVENOUS; SOFT TISSUE AS NEEDED
Status: DISCONTINUED | OUTPATIENT
Start: 2024-01-08 | End: 2024-01-08 | Stop reason: SURG

## 2024-01-08 RX ORDER — EPHEDRINE SULFATE 5 MG/ML
5 INJECTION INTRAVENOUS ONCE AS NEEDED
Status: DISCONTINUED | OUTPATIENT
Start: 2024-01-08 | End: 2024-01-08 | Stop reason: HOSPADM

## 2024-01-08 RX ADMIN — SODIUM CHLORIDE 10 ML/HR: 9 INJECTION, SOLUTION INTRAVENOUS at 11:27

## 2024-01-08 RX ADMIN — PROPOFOL 200 MG: 10 INJECTION, EMULSION INTRAVENOUS at 12:14

## 2024-01-08 RX ADMIN — ONDANSETRON 4 MG: 2 INJECTION INTRAMUSCULAR; INTRAVENOUS at 12:20

## 2024-01-08 RX ADMIN — DEXAMETHASONE SODIUM PHOSPHATE 8 MG: 4 INJECTION, SOLUTION INTRAMUSCULAR; INTRAVENOUS at 12:20

## 2024-01-08 RX ADMIN — ROCURONIUM BROMIDE 5 MG: 50 INJECTION, SOLUTION INTRAVENOUS at 12:14

## 2024-01-08 RX ADMIN — SUCCINYLCHOLINE CHLORIDE 140 MG: 20 INJECTION, SOLUTION INTRAMUSCULAR; INTRAVENOUS at 12:14

## 2024-01-08 RX ADMIN — LIDOCAINE HYDROCHLORIDE 100 MG: 20 INJECTION, SOLUTION INFILTRATION; PERINEURAL at 12:14

## 2024-01-08 RX ADMIN — SODIUM CHLORIDE: 9 INJECTION, SOLUTION INTRAVENOUS at 12:12

## 2024-01-08 NOTE — ANESTHESIA PREPROCEDURE EVALUATION
Anesthesia Evaluation     Patient summary reviewed and Nursing notes reviewed   NPO Solid Status: > 8 hours  NPO Liquid Status: > 8 hours           Airway   Mallampati: II  TM distance: >3 FB  Neck ROM: full  No difficulty expected  Dental - normal exam     Pulmonary - normal exam   Cardiovascular - normal exam    ECG reviewed    (+) hypertension, hyperlipidemia      Neuro/Psych  GI/Hepatic/Renal/Endo    (+) hiatal hernia, liver disease    Musculoskeletal     Abdominal  - normal exam    Bowel sounds: normal.   Substance History      OB/GYN          Other   arthritis,                 Anesthesia Plan    ASA 2     MAC and general     intravenous induction     Anesthetic plan, risks, benefits, and alternatives have been provided, discussed and informed consent has been obtained with: patient.    Plan discussed with CRNA.    CODE STATUS:

## 2024-01-08 NOTE — DISCHARGE INSTRUCTIONS
A responsible adult should stay with you and you should rest quietly for the rest of the day.    Do not drink alcohol, drive, operate any heavy machinery or power tools or make any legal/important decisions for the next 24 hours.     Progress your diet as tolerated.  If you begin to experience severe pain, increased shortness of breath, racing heartbeat or a fever above 101 F, seek immediate medical attention.     Follow up with MD as instructed. Call office for results in 3 to 5 days if needed.     Dr Delgado @ [phone]

## 2024-01-08 NOTE — OP NOTE
Operative Report:    Patient Name:  Daniel Cagle  YOB: 1955    Date of Surgery:  1/8/2024     Indications:  68 year year old man with a known paraesophageal hernia with worsening dysphagia.    Pre-op Diagnosis:   Paraesophageal hernia [K44.9]       Post-Op Diagnosis Codes:     * Paraesophageal hernia [K44.9]    Procedure/CPT® Codes:      Procedure(s):  ESOPHAGOGASTRODUODENOSCOPY    Staff:  Surgeon(s):  Paul Delgado MD    Endo Technician: Ruma Rae  Endo Nurse: Summer Howe RN  was responsible for performing the following activities: Suction and Irrigation and their skilled assistance was necessary for the success of this case.        Anesthesia: Monitored Anesthesia Care    Estimated Blood Loss: none    Implants:    Nothing was implanted during the procedure    Specimen:          None        Findings: 5 cm paraesophageal hernia,    Complications: none    Description of Procedure: After informed consent was obtained detailing the risks, benefits, and alternatives to the procedure that patient was brought to the endo suite. General anesthesia was induced and he was placed in the lateral decubitus position. A timeout was held detailing the patient and procedure to be performed. I inserted the endoscope through the mount and advanced it into the stomach and duodenum without issue. I reached the second portion of the duodenum and slowly retracted noting no abnormality. The intraabdominal stomach was examined without abnormality. The scope was retroflexed and a large hiatal hernia was present. The scope was retracted into that and undigested pills were noted to be in the herniated portion of the stomach. The length of the hernia measured approximately 5 cm. The scope was withdrawn into the esophagus with the Z-line at about 35 cm. The scope was withdrawn through the esophagus with no noted abnormality. He was then awoken from anesthesia and taken to recovery in good condition.       Paul NOONAN  MD Danny     Date: 1/8/2024  Time: 12:35 EST

## 2024-01-08 NOTE — ANESTHESIA POSTPROCEDURE EVALUATION
Patient: Daniel Cagle    Procedure Summary       Date: 01/08/24 Room / Location: [facility] ENDOSCOPY 4 / [facility] ENDOSCOPY    Anesthesia Start: 1212 Anesthesia Stop: 1239    Procedure: ESOPHAGOGASTRODUODENOSCOPY Diagnosis:       Paraesophageal hernia      (Paraesophageal hernia [K44.9])    Surgeons: Paul Delgado MD Provider: Jeremy León MD    Anesthesia Type: MAC, general ASA Status: 2            Anesthesia Type: MAC, general    Vitals  Vitals Value Taken Time   /74 01/08/24 1327   Temp 98.5 °F (36.9 °C) 01/08/24 1247   Pulse 68 01/08/24 1327   Resp 22 01/08/24 1327   SpO2 94 % 01/08/24 1327           Post Anesthesia Care and Evaluation    Patient location during evaluation: PACU  Patient participation: complete - patient participated  Level of consciousness: awake  Pain scale: See nurse's notes for pain score.  Pain management: adequate    Airway patency: patent  Anesthetic complications: No anesthetic complications  PONV Status: none  Cardiovascular status: acceptable  Respiratory status: acceptable and spontaneous ventilation  Hydration status: acceptable    Comments: Patient seen and examined postoperatively; vital signs stable; SpO2 greater than or equal to 90%; cardiopulmonary status stable; nausea/vomiting adequately controlled; pain adequately controlled; no apparent anesthesia complications; patient discharged from anesthesia care when discharge criteria were met

## 2024-01-08 NOTE — ANESTHESIA PROCEDURE NOTES
Airway  Urgency: elective    Date/Time: 1/8/2024 12:18 PM  Airway not difficult    General Information and Staff    Patient location during procedure: OR  Anesthesiologist: Jeremy León MD  CRNA/CAA: Susi Rizvi CRNA    Indications and Patient Condition  Indications for airway management: airway protection    Preoxygenated: yes (pt pre-O2 with 100% O2)  Mask difficulty assessment: 0 - not attempted    Final Airway Details  Final airway type: endotracheal airway      Successful airway: ETT  Cuffed: yes   Successful intubation technique: video laryngoscopy and RSI  Facilitating devices/methods: intubating stylet and cricoid pressure  Endotracheal tube insertion site: oral  Blade: Clayton  Blade size: 4  ETT size (mm): 7.5  Cormack-Lehane Classification: grade I - full view of glottis  Placement verified by: chest auscultation and capnometry   Cuff volume (mL): 7  Measured from: lips  ETT/EBT  to lips (cm): 22  Number of attempts at approach: 1  Assessment: lips, teeth, and gum same as pre-op and atraumatic intubation    Additional Comments  ATOETx1. No change in dentition.

## 2024-01-09 ENCOUNTER — TELEPHONE (OUTPATIENT)
Dept: SURGERY | Facility: CLINIC | Age: 69
End: 2024-01-09
Payer: MEDICARE

## 2024-01-10 ENCOUNTER — TELEPHONE (OUTPATIENT)
Dept: SURGERY | Facility: CLINIC | Age: 69
End: 2024-01-10
Payer: MEDICARE

## 2024-01-10 NOTE — TELEPHONE ENCOUNTER
I called Daniel Cagle to check on them post operatively. We discussed test appt  and post op office visit. Encouraged to call the office with any other questions.

## 2024-01-11 ENCOUNTER — TELEPHONE (OUTPATIENT)
Dept: FAMILY MEDICINE CLINIC | Facility: CLINIC | Age: 69
End: 2024-01-11

## 2024-01-11 LAB — QTC INTERVAL: NORMAL MS

## 2024-01-11 NOTE — TELEPHONE ENCOUNTER
Caller: DONATO BALDWIN    Relationship: Emergency Contact    Best call back number: [phone]     What medication are you requesting: ANTIBIOTIC    What are your current symptoms:   FEVER, CHEST CONGESTION,COUGHING,WEAK    How long have you been experiencing symptoms: 2 WEEKS    Have you had these symptoms before:    [] Yes  [] No    Have you been treated for these symptoms before:   [] Yes  [] No    If a prescription is needed, what is your preferred pharmacy and phone number:  [facility] [place] [facility] [address]  - [phone] [facility] [phone] FX

## 2024-01-11 NOTE — TELEPHONE ENCOUNTER
He should go to urgent care or be seen for acute visit.  He had a recent EGD and not sure if this is from the procedure or not.

## 2024-01-11 NOTE — TELEPHONE ENCOUNTER
Spoke with patients wife- let her know he can go to UC or we can see him at 2:15 today- I put him on the schedule

## 2024-01-16 ENCOUNTER — HOSPITAL ENCOUNTER (OUTPATIENT)
Dept: GENERAL RADIOLOGY | Facility: HOSPITAL | Age: 69
Discharge: HOME | End: 2024-01-16
Admitting: STUDENT IN AN ORGANIZED HEALTH CARE EDUCATION/TRAINING PROGRAM
Payer: MEDICARE

## 2024-01-16 DIAGNOSIS — K44.9 PARAESOPHAGEAL HERNIA: ICD-10-CM

## 2024-01-16 PROCEDURE — 74240 X-RAY XM UPR GI TRC 1CNTRST: CPT

## 2024-01-16 RX ADMIN — BARIUM SULFATE 183 ML: 960 POWDER, FOR SUSPENSION ORAL at 10:07

## 2024-01-16 RX ADMIN — BARIUM SULFATE 135 ML: 980 POWDER, FOR SUSPENSION ORAL at 10:07

## 2024-01-17 ENCOUNTER — OFFICE VISIT (OUTPATIENT)
Dept: SURGERY | Facility: CLINIC | Age: 69
End: 2024-01-17
Payer: MEDICARE

## 2024-01-17 ENCOUNTER — PREP FOR SURGERY (OUTPATIENT)
Dept: OTHER | Facility: HOSPITAL | Age: 69
End: 2024-01-17
Payer: MEDICARE

## 2024-01-17 VITALS
OXYGEN SATURATION: 94 % | WEIGHT: 176.2 LBS | TEMPERATURE: 97.3 F | HEIGHT: 70 IN | DIASTOLIC BLOOD PRESSURE: 89 MMHG | BODY MASS INDEX: 25.22 KG/M2 | HEART RATE: 68 BPM | SYSTOLIC BLOOD PRESSURE: 139 MMHG

## 2024-01-17 DIAGNOSIS — K44.9 PARAESOPHAGEAL HERNIA: Primary | ICD-10-CM

## 2024-01-17 PROCEDURE — 3079F DIAST BP 80-89 MM HG: CPT | Performed by: STUDENT IN AN ORGANIZED HEALTH CARE EDUCATION/TRAINING PROGRAM

## 2024-01-17 PROCEDURE — 99214 OFFICE O/P EST MOD 30 MIN: CPT | Performed by: STUDENT IN AN ORGANIZED HEALTH CARE EDUCATION/TRAINING PROGRAM

## 2024-01-17 PROCEDURE — 1159F MED LIST DOCD IN RCRD: CPT | Performed by: STUDENT IN AN ORGANIZED HEALTH CARE EDUCATION/TRAINING PROGRAM

## 2024-01-17 PROCEDURE — 3075F SYST BP GE 130 - 139MM HG: CPT | Performed by: STUDENT IN AN ORGANIZED HEALTH CARE EDUCATION/TRAINING PROGRAM

## 2024-01-17 PROCEDURE — 1160F RVW MEDS BY RX/DR IN RCRD: CPT | Performed by: STUDENT IN AN ORGANIZED HEALTH CARE EDUCATION/TRAINING PROGRAM

## 2024-01-17 NOTE — PROGRESS NOTES
GENERAL SURGERY ESTABLISHED PATIENT NOTE    Patient Care Team:  Chapis Hernandez APRN as PCP - General (Nurse Practitioner)  Martha Espino MD as Consulting Physician (Hematology and Oncology)  Paul Delgado MD as Surgeon (General Surgery)    Reason for follow-up: Paraesophageal hernia    Subjective     Patient is a 68 y.o. male presents with with a large paraesophageal hernia. Since his last visit has undergone EGD and UGI which demonstrated a 5cm hernia with no esophageal strictures. Continues to have dysphagia     Review of Systems   HENT:  Positive for trouble swallowing.         History  Past Medical History:   Diagnosis Date    Adult situational stress disorder     Allergic rhinitis     Anemia     Ankle sprain childhood    Arthritis of back 1984    Lower Spine Degen    Colon polyp 15 YRS AGO    NONE LAST SCOPE    Congenital hiatus hernia     Diverticulitis of colon DECADES AGO    VERY LIMITED    Diverticulosis     Dysphagia     ED (erectile dysfunction)     Hyperlipidemia     Hypertension     Low back strain 1984    pulled lifting    Lumbosacral disc disease 1984    ruptured disc L5-S1    Olecranon bursitis     Rotator cuff syndrome 1999    pulled throwing softball    Tennis elbow 2002 ????    Golfers Elbow from hammering     Past Surgical History:   Procedure Laterality Date    COLONOSCOPY  10/30/2017    Dr. Siddiqui    COLONOSCOPY  11/30/2000    adenoma polyp; Dr. White    COLONOSCOPY  08/18/2008    Dr. Marin; hyperplastic polyp    ENDOSCOPY  12/2023    Patient states it was done in [place].    ENDOSCOPY N/A 1/8/2024    Procedure: ESOPHAGOGASTRODUODENOSCOPY;  Surgeon: Paul Delgado MD;  Location: [facility] ENDOSCOPY;  Service: General;  Laterality: N/A;  5CM Hiatal Hernia    HERNIA REPAIR      TOOTH EXTRACTION      TRIGGER POINT INJECTION  1994    Sacroiliac Joint     Family History   Problem Relation Age of Onset    Stroke Father     Hypertension Father         CEREBRAL HEMORAGE    Dislocations  Mother         Broke Hip / Replaced    Cancer Sister         Pancriatic Cancer    Cancer Brother         Lung Cancer    Alcohol abuse Brother      Social History     Tobacco Use    Smoking status: Former     Packs/day: 1.00     Years: 5.00     Additional pack years: 0.00     Total pack years: 5.00     Types: Cigarettes     Start date: 1971     Quit date: 1976     Years since quittin.0     Passive exposure: Past    Smokeless tobacco: Never   Vaping Use    Vaping Use: Never used   Substance Use Topics    Alcohol use: No    Drug use: No     (Not in a hospital admission)    Allergies:  Cyclobenzaprine, Latex, and Prednisone    Objective     Vital Signs  Temp:  [97.3 °F (36.3 °C)] 97.3 °F (36.3 °C)  Heart Rate:  [68] 68  BP: (139-154)/(89) 139/89    Physical Exam  Cardiovascular:      Rate and Rhythm: Normal rate.   Pulmonary:      Effort: Pulmonary effort is normal.   Abdominal:      General: Abdomen is flat.      Palpations: Abdomen is soft.   Neurological:      Mental Status: He is alert.         Results Review:   Lab Results (last 24 hours)       ** No results found for the last 24 hours. **          FL Upper GI Single Contrast    Result Date: 2024  Impression: 1. Large fixed hiatal hernia. 2. Gastroesophageal reflux to the level of the thoracic inlet Electronically Signed: Daniel Lagos MD  2024 1:11 PM EST  Workstation ID: [id number]       I reviewed the patient's new imaging results and agree with the interpretation.  I reviewed the patient's other test results and agree with the interpretation    Assessment & Plan         EGD and UGI results discussed with the patient  We discussed the pathophysiology of, natural history of and indications for operation for his paraesophageal hernia which he understands   I have counseled the patient about the nature of the problem,  the natural history of the disease,  the risk and benefits of surgery,  the expected recovery,  and the alternatives to  surgery.  After this discussion  They were given an opportunity to ask questions,  have an understanding of diagnosis and treatment options, and with to proceed with surgery.      I discussed the patients findings and my recommendations with  the patient and his wife.     Paul Delgado MD  01/17/24  10:45 EST

## 2024-01-21 DIAGNOSIS — I10 HYPERTENSION, BENIGN: ICD-10-CM

## 2024-01-22 RX ORDER — IRBESARTAN 150 MG/1
TABLET ORAL
Qty: 90 TABLET | Refills: 0 | Status: SHIPPED | OUTPATIENT
Start: 2024-01-22

## 2024-01-24 DIAGNOSIS — K44.9 PARAESOPHAGEAL HERNIA: Primary | ICD-10-CM

## 2024-01-25 ENCOUNTER — LAB (OUTPATIENT)
Dept: LAB | Facility: HOSPITAL | Age: 69
End: 2024-01-25
Payer: MEDICARE

## 2024-01-25 ENCOUNTER — HOSPITAL ENCOUNTER (OUTPATIENT)
Dept: CARDIOLOGY | Facility: HOSPITAL | Age: 69
Discharge: HOME | End: 2024-01-25
Payer: MEDICARE

## 2024-01-25 ENCOUNTER — HOSPITAL ENCOUNTER (OUTPATIENT)
Dept: GENERAL RADIOLOGY | Facility: HOSPITAL | Age: 69
Discharge: HOME | End: 2024-01-25
Payer: MEDICARE

## 2024-01-25 LAB
ANION GAP SERPL CALCULATED.3IONS-SCNC: 11 MMOL/L (ref 5–15)
BASOPHILS # BLD AUTO: 0.05 10*3/MM3 (ref 0–0.2)
BASOPHILS NFR BLD AUTO: 1.7 % (ref 0–1.5)
BUN SERPL-MCNC: 8 MG/DL (ref 8–23)
BUN/CREAT SERPL: 11.6 (ref 7–25)
CALCIUM SPEC-SCNC: 9.4 MG/DL (ref 8.6–10.5)
CHLORIDE SERPL-SCNC: 94 MMOL/L (ref 98–107)
CO2 SERPL-SCNC: 29 MMOL/L (ref 22–29)
CREAT SERPL-MCNC: 0.69 MG/DL (ref 0.76–1.27)
DEPRECATED RDW RBC AUTO: 60 FL (ref 37–54)
EGFRCR SERPLBLD CKD-EPI 2021: 100.8 ML/MIN/1.73
EOSINOPHIL # BLD AUTO: 0.19 10*3/MM3 (ref 0–0.4)
EOSINOPHIL NFR BLD AUTO: 6.4 % (ref 0.3–6.2)
ERYTHROCYTE [DISTWIDTH] IN BLOOD BY AUTOMATED COUNT: 17.7 % (ref 12.3–15.4)
GLUCOSE SERPL-MCNC: 96 MG/DL (ref 65–99)
HCT VFR BLD AUTO: 42.8 % (ref 37.5–51)
HGB BLD-MCNC: 14 G/DL (ref 13–17.7)
IMM GRANULOCYTES # BLD AUTO: 0.04 10*3/MM3 (ref 0–0.05)
IMM GRANULOCYTES NFR BLD AUTO: 1.3 % (ref 0–0.5)
LYMPHOCYTES # BLD AUTO: 0.31 10*3/MM3 (ref 0.7–3.1)
LYMPHOCYTES NFR BLD AUTO: 10.4 % (ref 19.6–45.3)
MCH RBC QN AUTO: 30 PG (ref 26.6–33)
MCHC RBC AUTO-ENTMCNC: 32.7 G/DL (ref 31.5–35.7)
MCV RBC AUTO: 91.6 FL (ref 79–97)
MONOCYTES # BLD AUTO: 0.32 10*3/MM3 (ref 0.1–0.9)
MONOCYTES NFR BLD AUTO: 10.8 % (ref 5–12)
NEUTROPHILS NFR BLD AUTO: 2.06 10*3/MM3 (ref 1.7–7)
NEUTROPHILS NFR BLD AUTO: 69.4 % (ref 42.7–76)
NRBC BLD AUTO-RTO: 0 /100 WBC (ref 0–0.2)
PLATELET # BLD AUTO: 754 10*3/MM3 (ref 140–450)
PMV BLD AUTO: 8.2 FL (ref 6–12)
POTASSIUM SERPL-SCNC: 4.2 MMOL/L (ref 3.5–5.2)
QT INTERVAL: 440 MS
QTC INTERVAL: 428 MS
RBC # BLD AUTO: 4.67 10*6/MM3 (ref 4.14–5.8)
SODIUM SERPL-SCNC: 134 MMOL/L (ref 136–145)
WBC NRBC COR # BLD AUTO: 2.97 10*3/MM3 (ref 3.4–10.8)

## 2024-01-25 PROCEDURE — 80048 BASIC METABOLIC PNL TOTAL CA: CPT | Performed by: STUDENT IN AN ORGANIZED HEALTH CARE EDUCATION/TRAINING PROGRAM

## 2024-01-25 PROCEDURE — 71046 X-RAY EXAM CHEST 2 VIEWS: CPT

## 2024-01-25 PROCEDURE — 93005 ELECTROCARDIOGRAM TRACING: CPT

## 2024-01-25 PROCEDURE — 93010 ELECTROCARDIOGRAM REPORT: CPT | Performed by: INTERNAL MEDICINE

## 2024-01-25 PROCEDURE — 85025 COMPLETE CBC W/AUTO DIFF WBC: CPT | Performed by: STUDENT IN AN ORGANIZED HEALTH CARE EDUCATION/TRAINING PROGRAM

## 2024-01-25 PROCEDURE — 36415 COLL VENOUS BLD VENIPUNCTURE: CPT | Performed by: STUDENT IN AN ORGANIZED HEALTH CARE EDUCATION/TRAINING PROGRAM

## 2024-01-26 NOTE — PAT
Message to Dr. Delgado about  plts = 754 and WBC = 2.97, he said ok to proceed with surgery on 2/1/2024.

## 2024-02-01 ENCOUNTER — HOSPITAL ENCOUNTER (OUTPATIENT)
Facility: HOSPITAL | Age: 69
Discharge: HOME | End: 2024-02-03
Attending: STUDENT IN AN ORGANIZED HEALTH CARE EDUCATION/TRAINING PROGRAM | Admitting: STUDENT IN AN ORGANIZED HEALTH CARE EDUCATION/TRAINING PROGRAM
Payer: MEDICARE

## 2024-02-01 ENCOUNTER — ANESTHESIA (OUTPATIENT)
Dept: PERIOP | Facility: HOSPITAL | Age: 69
End: 2024-02-01
Payer: MEDICARE

## 2024-02-01 ENCOUNTER — ANESTHESIA EVENT (OUTPATIENT)
Dept: PERIOP | Facility: HOSPITAL | Age: 69
End: 2024-02-01
Payer: MEDICARE

## 2024-02-01 DIAGNOSIS — K44.9 PARAESOPHAGEAL HERNIA: ICD-10-CM

## 2024-02-01 PROCEDURE — 25810000003 LACTATED RINGERS PER 1000 ML: Performed by: NURSE ANESTHETIST, CERTIFIED REGISTERED

## 2024-02-01 PROCEDURE — 25010000002 MIDAZOLAM PER 1 MG: Performed by: NURSE ANESTHETIST, CERTIFIED REGISTERED

## 2024-02-01 PROCEDURE — 25010000002 CEFAZOLIN PER 500 MG: Performed by: STUDENT IN AN ORGANIZED HEALTH CARE EDUCATION/TRAINING PROGRAM

## 2024-02-01 PROCEDURE — 25010000002 PROPOFOL 200 MG/20ML EMULSION: Performed by: NURSE ANESTHETIST, CERTIFIED REGISTERED

## 2024-02-01 PROCEDURE — 25010000002 BUPIVACAINE (PF) 0.25 % SOLUTION: Performed by: STUDENT IN AN ORGANIZED HEALTH CARE EDUCATION/TRAINING PROGRAM

## 2024-02-01 PROCEDURE — 25010000002 SUGAMMADEX 200 MG/2ML SOLUTION: Performed by: NURSE ANESTHETIST, CERTIFIED REGISTERED

## 2024-02-01 PROCEDURE — 25010000002 HYDROMORPHONE 1 MG/ML SOLUTION: Performed by: NURSE ANESTHETIST, CERTIFIED REGISTERED

## 2024-02-01 PROCEDURE — 25810000003 LACTATED RINGERS PER 1000 ML: Performed by: ANESTHESIOLOGY

## 2024-02-01 PROCEDURE — 43281 LAP PARAESOPHAG HERN REPAIR: CPT | Performed by: STUDENT IN AN ORGANIZED HEALTH CARE EDUCATION/TRAINING PROGRAM

## 2024-02-01 PROCEDURE — 25010000002 FENTANYL CITRATE (PF) 100 MCG/2ML SOLUTION: Performed by: NURSE ANESTHETIST, CERTIFIED REGISTERED

## 2024-02-01 PROCEDURE — 25010000002 ONDANSETRON PER 1 MG: Performed by: NURSE ANESTHETIST, CERTIFIED REGISTERED

## 2024-02-01 PROCEDURE — G0378 HOSPITAL OBSERVATION PER HR: HCPCS

## 2024-02-01 PROCEDURE — 25010000002 MORPHINE PER 10 MG: Performed by: SURGERY

## 2024-02-01 RX ORDER — FENTANYL CITRATE 50 UG/ML
50 INJECTION, SOLUTION INTRAMUSCULAR; INTRAVENOUS
Status: DISCONTINUED | OUTPATIENT
Start: 2024-02-01 | End: 2024-02-01 | Stop reason: HOSPADM

## 2024-02-01 RX ORDER — MORPHINE SULFATE 2 MG/ML
2 INJECTION, SOLUTION INTRAMUSCULAR; INTRAVENOUS EVERY 4 HOURS PRN
Status: DISCONTINUED | OUTPATIENT
Start: 2024-02-01 | End: 2024-02-02

## 2024-02-01 RX ORDER — DROPERIDOL 2.5 MG/ML
0.62 INJECTION, SOLUTION INTRAMUSCULAR; INTRAVENOUS
Status: DISCONTINUED | OUTPATIENT
Start: 2024-02-01 | End: 2024-02-01 | Stop reason: HOSPADM

## 2024-02-01 RX ORDER — PROMETHAZINE HYDROCHLORIDE 25 MG/1
25 TABLET ORAL ONCE AS NEEDED
Status: DISCONTINUED | OUTPATIENT
Start: 2024-02-01 | End: 2024-02-01 | Stop reason: HOSPADM

## 2024-02-01 RX ORDER — EPHEDRINE SULFATE 5 MG/ML
INJECTION INTRAVENOUS AS NEEDED
Status: DISCONTINUED | OUTPATIENT
Start: 2024-02-01 | End: 2024-02-01 | Stop reason: SURG

## 2024-02-01 RX ORDER — SODIUM CHLORIDE 9 MG/ML
40 INJECTION, SOLUTION INTRAVENOUS AS NEEDED
Status: CANCELLED | OUTPATIENT
Start: 2024-02-01

## 2024-02-01 RX ORDER — PROMETHAZINE HYDROCHLORIDE 25 MG/1
25 SUPPOSITORY RECTAL ONCE AS NEEDED
Status: DISCONTINUED | OUTPATIENT
Start: 2024-02-01 | End: 2024-02-01 | Stop reason: HOSPADM

## 2024-02-01 RX ORDER — AMOXICILLIN 250 MG
2 CAPSULE ORAL 2 TIMES DAILY
Status: CANCELLED | OUTPATIENT
Start: 2024-02-01

## 2024-02-01 RX ORDER — SODIUM CHLORIDE 0.9 % (FLUSH) 0.9 %
10 SYRINGE (ML) INJECTION AS NEEDED
Status: DISCONTINUED | OUTPATIENT
Start: 2024-02-01 | End: 2024-02-01 | Stop reason: HOSPADM

## 2024-02-01 RX ORDER — FLUMAZENIL 0.1 MG/ML
0.2 INJECTION INTRAVENOUS AS NEEDED
Status: DISCONTINUED | OUTPATIENT
Start: 2024-02-01 | End: 2024-02-01 | Stop reason: HOSPADM

## 2024-02-01 RX ORDER — DIPHENHYDRAMINE HYDROCHLORIDE 50 MG/ML
12.5 INJECTION INTRAMUSCULAR; INTRAVENOUS
Status: DISCONTINUED | OUTPATIENT
Start: 2024-02-01 | End: 2024-02-01 | Stop reason: HOSPADM

## 2024-02-01 RX ORDER — BISACODYL 10 MG
10 SUPPOSITORY, RECTAL RECTAL DAILY PRN
Status: DISCONTINUED | OUTPATIENT
Start: 2024-02-01 | End: 2024-02-03 | Stop reason: HOSPADM

## 2024-02-01 RX ORDER — FENTANYL CITRATE 50 UG/ML
INJECTION, SOLUTION INTRAMUSCULAR; INTRAVENOUS AS NEEDED
Status: DISCONTINUED | OUTPATIENT
Start: 2024-02-01 | End: 2024-02-01 | Stop reason: SURG

## 2024-02-01 RX ORDER — PROPOFOL 10 MG/ML
INJECTION, EMULSION INTRAVENOUS AS NEEDED
Status: DISCONTINUED | OUTPATIENT
Start: 2024-02-01 | End: 2024-02-01 | Stop reason: SURG

## 2024-02-01 RX ORDER — ROCURONIUM BROMIDE 10 MG/ML
INJECTION, SOLUTION INTRAVENOUS AS NEEDED
Status: DISCONTINUED | OUTPATIENT
Start: 2024-02-01 | End: 2024-02-01 | Stop reason: SURG

## 2024-02-01 RX ORDER — HYDROCODONE BITARTRATE AND ACETAMINOPHEN 5; 325 MG/1; MG/1
1 TABLET ORAL EVERY 6 HOURS PRN
Status: DISCONTINUED | OUTPATIENT
Start: 2024-02-01 | End: 2024-02-02

## 2024-02-01 RX ORDER — LIDOCAINE HYDROCHLORIDE 10 MG/ML
0.5 INJECTION, SOLUTION INFILTRATION; PERINEURAL ONCE AS NEEDED
Status: DISCONTINUED | OUTPATIENT
Start: 2024-02-01 | End: 2024-02-01 | Stop reason: HOSPADM

## 2024-02-01 RX ORDER — BISACODYL 10 MG
10 SUPPOSITORY, RECTAL RECTAL DAILY PRN
Status: CANCELLED | OUTPATIENT
Start: 2024-02-01

## 2024-02-01 RX ORDER — POLYETHYLENE GLYCOL 3350 17 G/17G
17 POWDER, FOR SOLUTION ORAL DAILY PRN
Status: DISCONTINUED | OUTPATIENT
Start: 2024-02-01 | End: 2024-02-03 | Stop reason: HOSPADM

## 2024-02-01 RX ORDER — SODIUM CHLORIDE 0.9 % (FLUSH) 0.9 %
10 SYRINGE (ML) INJECTION EVERY 12 HOURS SCHEDULED
Status: CANCELLED | OUTPATIENT
Start: 2024-02-01

## 2024-02-01 RX ORDER — ONDANSETRON 2 MG/ML
INJECTION INTRAMUSCULAR; INTRAVENOUS AS NEEDED
Status: DISCONTINUED | OUTPATIENT
Start: 2024-02-01 | End: 2024-02-01 | Stop reason: SURG

## 2024-02-01 RX ORDER — IPRATROPIUM BROMIDE AND ALBUTEROL SULFATE 2.5; .5 MG/3ML; MG/3ML
3 SOLUTION RESPIRATORY (INHALATION) ONCE AS NEEDED
Status: DISCONTINUED | OUTPATIENT
Start: 2024-02-01 | End: 2024-02-01 | Stop reason: HOSPADM

## 2024-02-01 RX ORDER — BUPROPION HYDROCHLORIDE 150 MG/1
150 TABLET ORAL DAILY
Status: DISCONTINUED | OUTPATIENT
Start: 2024-02-01 | End: 2024-02-02

## 2024-02-01 RX ORDER — SODIUM CHLORIDE 9 MG/ML
40 INJECTION, SOLUTION INTRAVENOUS AS NEEDED
Status: DISCONTINUED | OUTPATIENT
Start: 2024-02-01 | End: 2024-02-03 | Stop reason: HOSPADM

## 2024-02-01 RX ORDER — SODIUM CHLORIDE, SODIUM LACTATE, POTASSIUM CHLORIDE, CALCIUM CHLORIDE 600; 310; 30; 20 MG/100ML; MG/100ML; MG/100ML; MG/100ML
INJECTION, SOLUTION INTRAVENOUS CONTINUOUS PRN
Status: DISCONTINUED | OUTPATIENT
Start: 2024-02-01 | End: 2024-02-01 | Stop reason: SURG

## 2024-02-01 RX ORDER — BUPIVACAINE HYDROCHLORIDE 2.5 MG/ML
INJECTION, SOLUTION EPIDURAL; INFILTRATION; INTRACAUDAL AS NEEDED
Status: DISCONTINUED | OUTPATIENT
Start: 2024-02-01 | End: 2024-02-01 | Stop reason: HOSPADM

## 2024-02-01 RX ORDER — ONDANSETRON 2 MG/ML
4 INJECTION INTRAMUSCULAR; INTRAVENOUS ONCE AS NEEDED
Status: DISCONTINUED | OUTPATIENT
Start: 2024-02-01 | End: 2024-02-01 | Stop reason: HOSPADM

## 2024-02-01 RX ORDER — CITALOPRAM 20 MG/1
20 TABLET ORAL 2 TIMES DAILY
Status: CANCELLED | OUTPATIENT
Start: 2024-02-01

## 2024-02-01 RX ORDER — AMOXICILLIN 250 MG
2 CAPSULE ORAL 2 TIMES DAILY
Status: DISCONTINUED | OUTPATIENT
Start: 2024-02-01 | End: 2024-02-03 | Stop reason: HOSPADM

## 2024-02-01 RX ORDER — BISACODYL 5 MG/1
5 TABLET, DELAYED RELEASE ORAL DAILY PRN
Status: DISCONTINUED | OUTPATIENT
Start: 2024-02-01 | End: 2024-02-03 | Stop reason: HOSPADM

## 2024-02-01 RX ORDER — POLYETHYLENE GLYCOL 3350 17 G/17G
17 POWDER, FOR SOLUTION ORAL DAILY PRN
Status: CANCELLED | OUTPATIENT
Start: 2024-02-01

## 2024-02-01 RX ORDER — BISACODYL 5 MG/1
5 TABLET, DELAYED RELEASE ORAL DAILY PRN
Status: CANCELLED | OUTPATIENT
Start: 2024-02-01

## 2024-02-01 RX ORDER — SODIUM CHLORIDE 0.9 % (FLUSH) 0.9 %
10 SYRINGE (ML) INJECTION AS NEEDED
Status: CANCELLED | OUTPATIENT
Start: 2024-02-01

## 2024-02-01 RX ORDER — MIDAZOLAM HYDROCHLORIDE 1 MG/ML
INJECTION INTRAMUSCULAR; INTRAVENOUS AS NEEDED
Status: DISCONTINUED | OUTPATIENT
Start: 2024-02-01 | End: 2024-02-01 | Stop reason: SURG

## 2024-02-01 RX ORDER — CITALOPRAM 20 MG/1
20 TABLET ORAL 2 TIMES DAILY
Status: DISCONTINUED | OUTPATIENT
Start: 2024-02-01 | End: 2024-02-03 | Stop reason: HOSPADM

## 2024-02-01 RX ORDER — SODIUM CHLORIDE 0.9 % (FLUSH) 0.9 %
10 SYRINGE (ML) INJECTION EVERY 12 HOURS SCHEDULED
Status: DISCONTINUED | OUTPATIENT
Start: 2024-02-01 | End: 2024-02-03 | Stop reason: HOSPADM

## 2024-02-01 RX ORDER — NALOXONE HCL 0.4 MG/ML
0.2 VIAL (ML) INJECTION AS NEEDED
Status: DISCONTINUED | OUTPATIENT
Start: 2024-02-01 | End: 2024-02-01 | Stop reason: HOSPADM

## 2024-02-01 RX ORDER — HYDROCODONE BITARTRATE AND ACETAMINOPHEN 5; 325 MG/1; MG/1
1 TABLET ORAL EVERY 6 HOURS PRN
Status: CANCELLED | OUTPATIENT
Start: 2024-02-01 | End: 2024-02-06

## 2024-02-01 RX ORDER — LIDOCAINE HYDROCHLORIDE 10 MG/ML
INJECTION, SOLUTION EPIDURAL; INFILTRATION; INTRACAUDAL; PERINEURAL AS NEEDED
Status: DISCONTINUED | OUTPATIENT
Start: 2024-02-01 | End: 2024-02-01 | Stop reason: SURG

## 2024-02-01 RX ORDER — SODIUM CHLORIDE, SODIUM LACTATE, POTASSIUM CHLORIDE, CALCIUM CHLORIDE 600; 310; 30; 20 MG/100ML; MG/100ML; MG/100ML; MG/100ML
1000 INJECTION, SOLUTION INTRAVENOUS CONTINUOUS
Status: DISPENSED | OUTPATIENT
Start: 2024-02-01 | End: 2024-02-03

## 2024-02-01 RX ORDER — SODIUM CHLORIDE 0.9 % (FLUSH) 0.9 %
10 SYRINGE (ML) INJECTION AS NEEDED
Status: DISCONTINUED | OUTPATIENT
Start: 2024-02-01 | End: 2024-02-03 | Stop reason: HOSPADM

## 2024-02-01 RX ORDER — BUPROPION HYDROCHLORIDE 150 MG/1
150 TABLET ORAL DAILY
Status: CANCELLED | OUTPATIENT
Start: 2024-02-01

## 2024-02-01 RX ADMIN — ONDANSETRON 4 MG: 2 INJECTION INTRAMUSCULAR; INTRAVENOUS at 14:28

## 2024-02-01 RX ADMIN — SUGAMMADEX 200 MG: 100 INJECTION, SOLUTION INTRAVENOUS at 14:35

## 2024-02-01 RX ADMIN — FENTANYL CITRATE 50 MCG: 50 INJECTION, SOLUTION INTRAMUSCULAR; INTRAVENOUS at 11:54

## 2024-02-01 RX ADMIN — HYDROMORPHONE HYDROCHLORIDE 0.5 MG: 1 INJECTION, SOLUTION INTRAMUSCULAR; INTRAVENOUS; SUBCUTANEOUS at 12:54

## 2024-02-01 RX ADMIN — DOCUSATE SODIUM 50 MG AND SENNOSIDES 8.6 MG 2 TABLET: 8.6; 5 TABLET, FILM COATED ORAL at 21:42

## 2024-02-01 RX ADMIN — HYDROMORPHONE HYDROCHLORIDE 0.5 MG: 1 INJECTION, SOLUTION INTRAMUSCULAR; INTRAVENOUS; SUBCUTANEOUS at 15:40

## 2024-02-01 RX ADMIN — CEFAZOLIN 2000 MG: 2 INJECTION, POWDER, FOR SOLUTION INTRAMUSCULAR; INTRAVENOUS at 12:03

## 2024-02-01 RX ADMIN — HYDROCODONE BITARTRATE AND ACETAMINOPHEN 1 TABLET: 5; 325 TABLET ORAL at 18:03

## 2024-02-01 RX ADMIN — FENTANYL CITRATE 50 MCG: 50 INJECTION, SOLUTION INTRAMUSCULAR; INTRAVENOUS at 11:59

## 2024-02-01 RX ADMIN — PROPOFOL 200 MG: 10 INJECTION, EMULSION INTRAVENOUS at 11:54

## 2024-02-01 RX ADMIN — CITALOPRAM 20 MG: 20 TABLET, FILM COATED ORAL at 21:41

## 2024-02-01 RX ADMIN — MORPHINE SULFATE 2 MG: 2 INJECTION, SOLUTION INTRAMUSCULAR; INTRAVENOUS at 22:05

## 2024-02-01 RX ADMIN — ROCURONIUM BROMIDE 20 MG: 10 INJECTION, SOLUTION INTRAVENOUS at 13:22

## 2024-02-01 RX ADMIN — EPHEDRINE SULFATE 10 MG: 5 INJECTION INTRAVENOUS at 12:14

## 2024-02-01 RX ADMIN — Medication 10 ML: at 22:05

## 2024-02-01 RX ADMIN — SODIUM CHLORIDE, POTASSIUM CHLORIDE, SODIUM LACTATE AND CALCIUM CHLORIDE 1000 ML: 600; 310; 30; 20 INJECTION, SOLUTION INTRAVENOUS at 09:54

## 2024-02-01 RX ADMIN — HYDROMORPHONE HYDROCHLORIDE 0.5 MG: 1 INJECTION, SOLUTION INTRAMUSCULAR; INTRAVENOUS; SUBCUTANEOUS at 13:07

## 2024-02-01 RX ADMIN — MIDAZOLAM HYDROCHLORIDE 2 MG: 2 INJECTION, SOLUTION INTRAMUSCULAR; INTRAVENOUS at 11:51

## 2024-02-01 RX ADMIN — LIDOCAINE HYDROCHLORIDE 50 MG: 10 INJECTION, SOLUTION EPIDURAL; INFILTRATION; INTRACAUDAL; PERINEURAL at 11:54

## 2024-02-01 RX ADMIN — SODIUM CHLORIDE, SODIUM LACTATE, POTASSIUM CHLORIDE, AND CALCIUM CHLORIDE: .6; .31; .03; .02 INJECTION, SOLUTION INTRAVENOUS at 11:48

## 2024-02-01 NOTE — ANESTHESIA POSTPROCEDURE EVALUATION
Patient: Daniel Cagle    Procedure Summary       Date: 02/01/24 Room / Location: [facility] OR 09 / [facility] MAIN OR    Anesthesia Start: 1148 Anesthesia Stop: 1443    Procedures:       PARAESOPHAGEAL HERNIA REPAIR LAPAROSCOPIC (Abdomen)      ESOPHAGOGASTRODUODENOSCOPY Diagnosis:       Paraesophageal hernia      (Paraesophageal hernia [K44.9])    Surgeons: Paul Delgado MD Provider:     Anesthesia Type: general ASA Status: 3            Anesthesia Type: general    Vitals  Vitals Value Taken Time   /78 02/01/24 1601   Temp 97.5 °F (36.4 °C) 02/01/24 1601   Pulse 102 02/01/24 1611   Resp 15 02/01/24 1601   SpO2 92 % 02/01/24 1611   Vitals shown include unfiled device data.        Post Anesthesia Care and Evaluation    Patient location during evaluation: PACU  Patient participation: complete - patient participated  Level of consciousness: awake  Pain scale: See nurse's notes for pain score.  Pain management: adequate    Airway patency: patent  Anesthetic complications: No anesthetic complications  PONV Status: none  Cardiovascular status: acceptable  Respiratory status: acceptable and spontaneous ventilation  Hydration status: acceptable    Comments: Patient seen and examined postoperatively; vital signs stable; SpO2 greater than or equal to 90%; cardiopulmonary status stable; nausea/vomiting adequately controlled; pain adequately controlled; no apparent anesthesia complications; patient discharged from anesthesia care when discharge criteria were met

## 2024-02-01 NOTE — ANESTHESIA PREPROCEDURE EVALUATION
Anesthesia Evaluation     NPO Solid Status: > 8 hours  NPO Liquid Status: > 2 hours           Airway   Mallampati: II  TM distance: >3 FB  Neck ROM: full  No difficulty expected  Dental - normal exam     Pulmonary - normal exam   (+) asthma,  Cardiovascular - normal exam    (+) hypertension well controlled, hyperlipidemia      Neuro/Psych  (+) psychiatric history  GI/Hepatic/Renal/Endo    (+) hiatal hernia, liver disease    Musculoskeletal     Abdominal  - normal exam    Bowel sounds: normal.   Substance History      OB/GYN          Other   arthritis,                 Anesthesia Plan    ASA 3     general   total IV anesthesia  intravenous induction     Anesthetic plan, risks, benefits, and alternatives have been provided, discussed and informed consent has been obtained with: patient.  Pre-procedure education provided  Plan discussed with CRNA.    CODE STATUS:

## 2024-02-01 NOTE — ANESTHESIA PROCEDURE NOTES
Airway  Urgency: elective    Date/Time: 2/1/2024 11:56 AM  Airway not difficult    General Information and Staff    Patient location during procedure: OR  CRNA/CAA: Francesco Blandon CRNA    Indications and Patient Condition  Indications for airway management: airway protection    Preoxygenated: yes  MILS maintained throughout  Mask difficulty assessment: 1 - vent by mask    Final Airway Details  Final airway type: endotracheal airway      Successful airway: ETT     Successful intubation technique: direct laryngoscopy  Endotracheal tube insertion site: oral  Blade: Ozzie  Blade size: 3  ETT size (mm): 7.5  Cormack-Lehane Classification: grade I - full view of glottis  Placement verified by: chest auscultation   Measured from: teeth  ETT/EBT  to teeth (cm): 22  Number of attempts at approach: 1  Assessment: lips, teeth, and gum same as pre-op and atraumatic intubation

## 2024-02-01 NOTE — OP NOTE
Operative Report:    Patient Name:  Daniel Cagle  YOB: 1955    Date of Surgery:  2/1/2024     Indications: 38-year-old man with a known large paraesophageal hernia.  Has been having dysphagia that has been worsening.  Here for paraesophageal hernia repair    Pre-op Diagnosis:   Paraesophageal hernia [K44.9]       Post-Op Diagnosis Codes:     * Paraesophageal hernia [K44.9]    Procedure/CPT® Codes:      Procedure(s):  PARAESOPHAGEAL HERNIA REPAIR LAPAROSCOPIC  ESOPHAGOGASTRODUODENOSCOPY    Staff:  MD Ned Cerda MD was responsible for performing the following activities: Retraction, Suction, and Held/Positioned Camera and their skilled assistance was necessary for the success of this case.    Circulator: Geno Barnes RN; Chaparrita Mendez RN; Jovi Martin RN; Benito Tran RN  Scrub Person: Cesar Garcia RN; Elizabeth Red; Norma Red  Vendor Representative: Surendra Teixeira          Anesthesia: General    Estimated Blood Loss: minimal    Implants:    Nothing was implanted during the procedure    Specimen:          None        Findings: Large paraesophageal hernia, normal EGD    Complications: None    Description of Procedure: After informed consent was obtained detailing the risk, benefits, and alternatives to the procedure the patient was brought to the operating room placed supine on the operating table.  General anesthesia was induced by anesthesia colleagues.  His abdomen was prepped and draped in standard fashion.  A timeout was held detailing the patient, procedure, and location to be performed.  I began by accessing the abdomen and the left upper quadrant with a 5 mm Optiview trocar.  Pneumoperitoneum was established and we ensured no injury to the underlying structures on entry.  We then placed a 10 mm port in the right upper quadrant and two 5 mm ports 1 on each flank.  A Artemio liver retractor was placed to expose the hiatus.  We  began by starting to reduce the stomach.  This is generally pretty easy to do how there is some of the hernia sac that was preventing full reduction of the stomach layer between the hernia sac and the diaphragmatic ugo was incised with the harmonic scalpel.  Then using blunt dissection and the harmonic we circumferentially dissected out the hiatus and the mediastinum to reduce the hernia sac back into the abdomen.  Once this was done, using the harmonic, we ligated the short gastric vessels on the greater curvature of the stomach all the way up to the hiatus mobilized the greater curvature of the stomach.  Once we had a space posterior to the GE junction a Penrose drain was placed and grafts to aid in retraction of the GE junction and the esophagus.  Then performed a high mediastinal dissection to ensure adequate intra-abdominal length of the esophagus. We then had anesthesia inserted a 56 Faroese bougie to ensure that the hiatus was not too tight and our wrap was too tight.  We then proceeded with closure of the hiatus.  This was done with interrupted simple 0 Ethibond sutures.  Once this was completed we then performed a 360 degree Nissen fundoplication.  The edges of the stomach were sewn together with simple interrupted 0 Ethibond sutures for the length of 3 cm.  We then remove the Penrose drain and proceed with an EGD.  The scope was inserted through the mouth and advanced to the esophagus and into the stomach.  It was retroflexed and shown that a wrap was intact and not too tight.  On withdrawal of the endoscope the esophagus appeared normal without any injury.  Satisfied with our repair we then closed with the 10 mm fascial hole with a 0 Vicryl in a Juan Jose-Eug[place].  The liver retractor was then removed without incident. Our reports were removed and the skin was closed with 4-0 Vicryl.  Exofin was applied.  He was then awoken from general anesthesia and taken to the PACU in good condition.  All sponge needle  instrument counts were correct x 2 at the end of the case.      Paul Delgado MD     Date: 2/1/2024  Time: 14:41 EST

## 2024-02-01 NOTE — PLAN OF CARE
Goal Outcome Evaluation:              Outcome Evaluation: recieved patient from or after paraesophageal hernia repair. Patient has 6 laprosopic sites to abomen with skin glue. patient c/o pain 3/10. No other nursing concerns. VS satble. patient on 4L NC.

## 2024-02-02 LAB
ANION GAP SERPL CALCULATED.3IONS-SCNC: 11 MMOL/L (ref 5–15)
BUN SERPL-MCNC: 10 MG/DL (ref 8–23)
BUN/CREAT SERPL: 15.4 (ref 7–25)
CALCIUM SPEC-SCNC: 9.4 MG/DL (ref 8.6–10.5)
CHLORIDE SERPL-SCNC: 95 MMOL/L (ref 98–107)
CO2 SERPL-SCNC: 29 MMOL/L (ref 22–29)
CREAT SERPL-MCNC: 0.65 MG/DL (ref 0.76–1.27)
DEPRECATED RDW RBC AUTO: 64.3 FL (ref 37–54)
EGFRCR SERPLBLD CKD-EPI 2021: 102.6 ML/MIN/1.73
ERYTHROCYTE [DISTWIDTH] IN BLOOD BY AUTOMATED COUNT: 19.6 % (ref 12.3–15.4)
GLUCOSE SERPL-MCNC: 111 MG/DL (ref 65–99)
HCT VFR BLD AUTO: 39.7 % (ref 37.5–51)
HGB BLD-MCNC: 13 G/DL (ref 13–17.7)
MCH RBC QN AUTO: 29.7 PG (ref 26.6–33)
MCHC RBC AUTO-ENTMCNC: 32.7 G/DL (ref 31.5–35.7)
MCV RBC AUTO: 90.8 FL (ref 79–97)
PLATELET # BLD AUTO: 404 10*3/MM3 (ref 140–450)
PMV BLD AUTO: 6.8 FL (ref 6–12)
POTASSIUM SERPL-SCNC: 4.1 MMOL/L (ref 3.5–5.2)
RBC # BLD AUTO: 4.37 10*6/MM3 (ref 4.14–5.8)
SODIUM SERPL-SCNC: 135 MMOL/L (ref 136–145)
WBC NRBC COR # BLD AUTO: 6.3 10*3/MM3 (ref 3.4–10.8)

## 2024-02-02 PROCEDURE — 85027 COMPLETE CBC AUTOMATED: CPT | Performed by: STUDENT IN AN ORGANIZED HEALTH CARE EDUCATION/TRAINING PROGRAM

## 2024-02-02 PROCEDURE — 25010000002 HYDROMORPHONE 1 MG/ML SOLUTION: Performed by: SURGERY

## 2024-02-02 PROCEDURE — 99024 POSTOP FOLLOW-UP VISIT: CPT | Performed by: STUDENT IN AN ORGANIZED HEALTH CARE EDUCATION/TRAINING PROGRAM

## 2024-02-02 PROCEDURE — 80048 BASIC METABOLIC PNL TOTAL CA: CPT | Performed by: STUDENT IN AN ORGANIZED HEALTH CARE EDUCATION/TRAINING PROGRAM

## 2024-02-02 PROCEDURE — 25010000002 MORPHINE PER 10 MG: Performed by: SURGERY

## 2024-02-02 RX ORDER — HYDROCODONE BITARTRATE AND ACETAMINOPHEN 7.5; 325 MG/1; MG/1
1 TABLET ORAL EVERY 4 HOURS PRN
Status: DISCONTINUED | OUTPATIENT
Start: 2024-02-02 | End: 2024-02-03 | Stop reason: HOSPADM

## 2024-02-02 RX ORDER — TAMSULOSIN HYDROCHLORIDE 0.4 MG/1
0.4 CAPSULE ORAL 2 TIMES DAILY
Status: DISCONTINUED | OUTPATIENT
Start: 2024-02-02 | End: 2024-02-03 | Stop reason: HOSPADM

## 2024-02-02 RX ORDER — TAMSULOSIN HYDROCHLORIDE 0.4 MG/1
0.4 CAPSULE ORAL DAILY
Status: DISCONTINUED | OUTPATIENT
Start: 2024-02-02 | End: 2024-02-02

## 2024-02-02 RX ORDER — BUPROPION HYDROCHLORIDE 150 MG/1
150 TABLET ORAL NIGHTLY
Status: DISCONTINUED | OUTPATIENT
Start: 2024-02-02 | End: 2024-02-03 | Stop reason: HOSPADM

## 2024-02-02 RX ORDER — LIDOCAINE HYDROCHLORIDE 20 MG/ML
JELLY TOPICAL ONCE
Status: COMPLETED | OUTPATIENT
Start: 2024-02-02 | End: 2024-02-02

## 2024-02-02 RX ORDER — HYDROCODONE BITARTRATE AND ACETAMINOPHEN 5; 325 MG/1; MG/1
1 TABLET ORAL EVERY 4 HOURS PRN
Status: DISCONTINUED | OUTPATIENT
Start: 2024-02-02 | End: 2024-02-02

## 2024-02-02 RX ORDER — PHENAZOPYRIDINE HYDROCHLORIDE 200 MG/1
200 TABLET, FILM COATED ORAL
Status: DISCONTINUED | OUTPATIENT
Start: 2024-02-02 | End: 2024-02-03 | Stop reason: HOSPADM

## 2024-02-02 RX ADMIN — CITALOPRAM 20 MG: 20 TABLET, FILM COATED ORAL at 21:35

## 2024-02-02 RX ADMIN — MORPHINE SULFATE 2 MG: 2 INJECTION, SOLUTION INTRAMUSCULAR; INTRAVENOUS at 06:13

## 2024-02-02 RX ADMIN — Medication 10 ML: at 21:36

## 2024-02-02 RX ADMIN — PHENAZOPYRIDINE 200 MG: 200 TABLET ORAL at 12:27

## 2024-02-02 RX ADMIN — DOCUSATE SODIUM 50 MG AND SENNOSIDES 8.6 MG 2 TABLET: 8.6; 5 TABLET, FILM COATED ORAL at 08:37

## 2024-02-02 RX ADMIN — TAMSULOSIN HYDROCHLORIDE 0.4 MG: 0.4 CAPSULE ORAL at 08:37

## 2024-02-02 RX ADMIN — HYDROCODONE BITARTRATE AND ACETAMINOPHEN 1 TABLET: 5; 325 TABLET ORAL at 18:30

## 2024-02-02 RX ADMIN — HYDROCODONE BITARTRATE AND ACETAMINOPHEN 1 TABLET: 7.5; 325 TABLET ORAL at 21:34

## 2024-02-02 RX ADMIN — TAMSULOSIN HYDROCHLORIDE 0.4 MG: 0.4 CAPSULE ORAL at 21:35

## 2024-02-02 RX ADMIN — DOCUSATE SODIUM 50 MG AND SENNOSIDES 8.6 MG 2 TABLET: 8.6; 5 TABLET, FILM COATED ORAL at 21:34

## 2024-02-02 RX ADMIN — MORPHINE SULFATE 2 MG: 2 INJECTION, SOLUTION INTRAMUSCULAR; INTRAVENOUS at 10:44

## 2024-02-02 RX ADMIN — TAMSULOSIN HYDROCHLORIDE 0.4 MG: 0.4 CAPSULE ORAL at 03:45

## 2024-02-02 RX ADMIN — HYDROCODONE BITARTRATE AND ACETAMINOPHEN 1 TABLET: 5; 325 TABLET ORAL at 12:27

## 2024-02-02 RX ADMIN — BUPROPION HYDROCHLORIDE 150 MG: 150 TABLET, EXTENDED RELEASE ORAL at 21:35

## 2024-02-02 RX ADMIN — HYDROCODONE BITARTRATE AND ACETAMINOPHEN 1 TABLET: 5; 325 TABLET ORAL at 07:06

## 2024-02-02 RX ADMIN — PHENAZOPYRIDINE 200 MG: 200 TABLET ORAL at 08:37

## 2024-02-02 RX ADMIN — HYDROMORPHONE HYDROCHLORIDE 0.5 MG: 1 INJECTION, SOLUTION INTRAMUSCULAR; INTRAVENOUS; SUBCUTANEOUS at 00:35

## 2024-02-02 RX ADMIN — LIDOCAINE HYDROCHLORIDE: 20 JELLY TOPICAL at 03:46

## 2024-02-02 RX ADMIN — Medication 10 ML: at 08:40

## 2024-02-02 RX ADMIN — PHENAZOPYRIDINE 200 MG: 200 TABLET ORAL at 18:30

## 2024-02-02 NOTE — CASE MANAGEMENT/SOCIAL WORK
Discharge Planning Assessment   Edi     Patient Name: Daniel Cagle  MRN: [id number]  Today's Date: 2/2/2024    Admit Date: 2/1/2024    Plan: Plan to return home with SI, Jan.   Discharge Needs Assessment       Row Name 02/02/24 1318       Living Environment    People in Home significant other    Unique Family Situation Jan - SI    Current Living Arrangements home    Potentially Unsafe Housing Conditions none    In the past 12 months has the electric, gas, oil, or water company threatened to shut off services in your home? No    Primary Care Provided by self    Provides Primary Care For no one    Family Caregiver if Needed spouse    Quality of Family Relationships helpful;involved;supportive    Able to Return to Prior Arrangements yes       Resource/Environmental Concerns    Resource/Environmental Concerns none    Transportation Concerns none       Transportation Needs    In the past 12 months, has lack of transportation kept you from medical appointments or from getting medications? no    In the past 12 months, has lack of transportation kept you from meetings, work, or from getting things needed for daily living? No       Food Insecurity    Within the past 12 months, you worried that your food would run out before you got the money to buy more. Never true    Within the past 12 months, the food you bought just didn't last and you didn't have money to get more. Never true       Transition Planning    Patient/Family Anticipates Transition to home with family    Patient/Family Anticipated Services at Transition none    Transportation Anticipated car, drives self;family or friend will provide       Discharge Needs Assessment    Readmission Within the Last 30 Days no previous admission in last 30 days    Equipment Currently Used at Home bp cuff    Concerns to be Addressed discharge planning    Anticipated Changes Related to Illness none    Equipment Needed After Discharge none                   Discharge Plan        Row Name 02/02/24 9380       Plan    Plan Plan to return home with Curt TIWARI.    Patient/Family in Agreement with Plan yes    Plan Comments Patient lives at home with significant other, Curt who will transport at discharge. Patient performs IADLs. PCP and pharmacy confirmed. Agreeable to M2B.  Denies financial assistance needs for medication and/or food. Denies HH and/or rehab needs.  DC Barriers:  cl liquid diet, pain mgmt, Urology following.                  Demographic Summary       Row Name 02/02/24 1318       General Information    Admission Type observation    Arrived From emergency department    Required Notices Provided Observation Status Notice    Referral Source admission list    Reason for Consult discharge planning    Preferred Language English                   Functional Status       Row Name 02/02/24 1310       Functional Status    Usual Activity Tolerance good    Current Activity Tolerance good       Functional Status, IADL    Medications independent    Meal Preparation independent    Housekeeping independent    Laundry independent    Shopping independent       Mental Status    General Appearance WDL WDL       Mental Status Summary    Recent Changes in Mental Status/Cognitive Functioning no changes           BRIAN Branham RN  SIPS/ICU   O: [phone]  C: [phone]  [email or web address]

## 2024-02-02 NOTE — PLAN OF CARE
"Goal Outcome Evaluation:  Plan of Care Reviewed With: patient           Outcome Evaluation: Patient having difficulty voiding this shift, atempted multiple Streight caths by multiple nurses. Bladder scan did show 200. Provider notified and made aware. Patient did get up and ambulate to the bathroom with a standby assist but was unable to void. Patient stated \"when he feels a \"stream\" coming it burns causing him to hold back and dribble. Call was placed to provider and made aware that he has not voided since surgery. Patient had 400cc at last scan at 0300. Orders for a urology consult to be placed in AM and to have the patient keep trying to void.                               "

## 2024-02-02 NOTE — PROGRESS NOTES
General Surgery Progress Note    Name: Daniel Cagle ADMIT: 2024   : 1955  PCP: Chapis Hernandez APRN    MRN: [id number] LOS: 0 days   AGE/SEX: 68 y.o. male  ROOM: [address]    No chief complaint on file.    Subjective     Issues with urinary retention overnight.  Archer placed with relief of symptoms.  Tolerating clears with no issues.    Objective     Scheduled Medications:   buPROPion XL, 150 mg, Oral, Nightly  citalopram, 20 mg, Oral, BID  phenazopyridine, 200 mg, Oral, TID With Meals  senna-docusate sodium, 2 tablet, Oral, BID  sodium chloride, 10 mL, Intravenous, Q12H  tamsulosin, 0.4 mg, Oral, BID        Active Infusions:  lactated ringers, 1,000 mL, Last Rate: 1,000 mL (24 0954)        As Needed Medications:    senna-docusate sodium **AND** polyethylene glycol **AND** bisacodyl **AND** bisacodyl    Calcium Replacement - Follow Nurse / BPA Driven Protocol    HYDROcodone-acetaminophen    Magnesium Standard Dose Replacement - Follow Nurse / BPA Driven Protocol    Morphine    Phosphorus Replacement - Follow Nurse / BPA Driven Protocol    Potassium Replacement - Follow Nurse / BPA Driven Protocol    sodium chloride    sodium chloride    Vital Signs  Vital Signs Patient Vitals for the past 24 hrs:   BP Temp Temp src Pulse Resp SpO2 Height Weight   24 0759 120/78 98.8 °F (37.1 °C) Oral 83 20 91 % -- --   24 0527 129/84 98.1 °F (36.7 °C) Oral 88 20 94 % -- --   24 0046 135/81 97.8 °F (36.6 °C) Oral 99 -- 94 % -- --   24 2144 155/93 -- -- 99 -- -- -- --   24 2103 115/71 97.6 °F (36.4 °C) Oral 84 16 91 % -- --   24 1700 126/83 97.6 °F (36.4 °C) Oral 96 15 95 % -- --   24 1601 125/78 97.5 °F (36.4 °C) Temporal 96 15 94 % -- --   24 1546 111/78 -- -- 99 16 91 % -- --   24 1531 116/75 -- -- 98 16 95 % -- --   24 1516 127/88 -- -- 90 9 98 % -- --   24 1501 129/89 -- -- 90 9 99 % -- --   24 1456 162/96 -- -- 94 11 97 % -- --  "  02/01/24 1451 143/83 -- -- 98 15 95 % -- --   02/01/24 1446 143/83 96.7 °F (35.9 °C) Temporal 95 17 98 % -- --   02/01/24 0939 123/80 98.3 °F (36.8 °C) Oral 69 15 95 % 177.8 cm (70\") 76.1 kg (167 lb 12.8 oz)     I/O:  I/O last 3 completed shifts:  In: 1220 [P.O.:220; I.V.:1000]  Out: 25 [Urine:25]    Physical Exam:  Physical Exam  Cardiovascular:      Rate and Rhythm: Normal rate.   Pulmonary:      Effort: Pulmonary effort is normal.   Abdominal:      General: Abdomen is flat.      Palpations: Abdomen is soft.      Comments: Incisions clean dry and intact.  Appropriately tender to palpation.   Neurological:      Mental Status: He is alert.         Results Review:     CBC    Results from last 7 days   Lab Units 02/02/24  0903   WBC 10*3/mm3 6.30   HEMOGLOBIN g/dL 13.0   PLATELETS 10*3/mm3 404     BMP       I reviewed the patient's new clinical results.  I reviewed the patient's new imaging results and agree with the interpretation.  I reviewed the patient's other test results and agree with the interpretation    Assessment & Plan       Paraesophageal hernia      68 y.o. male postop day 1 from laparoscopic paraesophageal hernia repair    Doing well this morning.  Advance to full liquids.  Archer management per urology, appreciate assistance.  Likely home soon.  Dr. Tanner to see over the weekend.    Paul Delgado MD  02/02/24  09:23 EST    "

## 2024-02-02 NOTE — CONSULTS
Urology Consult Note    Patient:Daniel Cagle :1955  Room:[place] [address]  Admit Date2024  Age:68 y.o.     SEX:male     DOS:2024     MR:[id number]     Visit:[id number]       Attending: Paul Delgado MD  Referring Provider: Dr. Delgado  Reason for Consultation: Urinary retention    Patient Care Team:  Chapis Hernandez APRN as PCP - General (Nurse Practitioner)  Martha Espino MD as Consulting Physician (Hematology and Oncology)  Paul Delgado MD as Surgeon (General Surgery)    Chief complaint unable to urinate    Subjective .     History of present illness: 60-year-old male who underwent a hiatal hernia repair yesterday.  His Archer catheter was removed the splint had a multiple void since then.  He is having some discomfort.  Multiple attempts by the nurses were unsuccessful to catheterize the patient.  Patient does have some baseline voiding symptoms but nothing severe.    Review of Systems  10 point review of systems were reviewed and are negative except for:  Constitution:  positive for See HPI    History  Past Medical History:   Diagnosis Date    Adult situational stress disorder     Allergic rhinitis     Anemia     Ankle sprain childhood    Arthritis of back 1984    Lower Spine Degen    Colon polyp 15 YRS AGO    NONE LAST SCOPE    Congenital hiatus hernia     Diverticulitis of colon DECADES AGO    VERY LIMITED    Diverticulosis     Dysphagia     ED (erectile dysfunction)     Hyperlipidemia     Hypertension     Low back strain     pulled lifting    Lumbosacral disc disease     ruptured disc L5-S1    Olecranon bursitis     Rotator cuff syndrome     pulled throwing softball    Tennis elbow  ????    Golfers Elbow from hammering     Past Surgical History:   Procedure Laterality Date    COLONOSCOPY  10/30/2017    Dr. Siddiqui    COLONOSCOPY  2000    adenoma polyp; Dr. White    COLONOSCOPY  2008    Dr. Marin; hyperplastic polyp    ENDOSCOPY  2023    Patient states  it was done in [place].    ENDOSCOPY N/A 2024    Procedure: ESOPHAGOGASTRODUODENOSCOPY;  Surgeon: Paul Delgado MD;  Location: [facility] ENDOSCOPY;  Service: General;  Laterality: N/A;  5CM Hiatal Hernia    HERNIA REPAIR      TOOTH EXTRACTION      TRIGGER POINT INJECTION      Sacroiliac Joint     Social History     Socioeconomic History    Marital status:    Tobacco Use    Smoking status: Former     Packs/day: 1.00     Years: 5.00     Additional pack years: 0.00     Total pack years: 5.00     Types: Cigarettes     Start date: 1971     Quit date: 1976     Years since quittin.1     Passive exposure: Past    Smokeless tobacco: Never   Vaping Use    Vaping Use: Never used   Substance and Sexual Activity    Alcohol use: No    Drug use: No    Sexual activity: Not Currently     Partners: Female     Birth control/protection: Condom, Natural family planning/Rhythm     Family History   Problem Relation Age of Onset    Stroke Father     Hypertension Father         CEREBRAL HEMORAGE    Dislocations Mother         Broke Hip / Replaced    Cancer Sister         Pancriatic Cancer    Cancer Brother         Lung Cancer    Alcohol abuse Brother      Allergy  Allergies   Allergen Reactions    Cyclobenzaprine Unknown - High Severity     Jerking movements.     Latex Other (See Comments)     Patient states following dental procedure, it causes ulcers in mouth.     Prednisone Other (See Comments)     Made hyper and burning      Prior to Admission medications    Medication Sig Start Date End Date Taking? Authorizing Provider   buPROPion XL (WELLBUTRIN XL) 150 MG 24 hr tablet Take 1 tablet by mouth Daily.  Patient taking differently: Take 1 tablet by mouth Every Night. 23  Yes Chapis Hernandez APRN   citalopram (CeleXA) 40 MG tablet Take 0.5 tablets by mouth 2 (Two) Times a Day. Taking one tablet daily. Breaking it in half to take half in the AM and half in the evening. 23  Yes Chapis Hernandez APRN    diclofenac (VOLTAREN) 50 MG EC tablet Take 1 tablet by mouth 2 (Two) Times a Day. 23  Yes Chapis Hernandez APRN   ferrous sulfate 325 (65 FE) MG tablet Take 1 tablet by mouth Daily With Breakfast. 23  Yes Martha Espino MD   hydroCHLOROthiazide (HYDRODIURIL) 25 MG tablet Take 1 tablet by mouth Daily. 23  Yes Chapis Hernandez APRN   irbesartan (AVAPRO) 150 MG tablet TAKE 1 TABLET BY MOUTH ONCE DAILY AT NIGHT 24  Yes Chapis Hernandez APRN   Loratadine 10 MG capsule Take  by mouth.   Yes ProviderKevin MD   pantoprazole (PROTONIX) 40 MG EC tablet Take 1 tablet by mouth Daily. 24  Yes Chapis Hernandez APRN   simvastatin (ZOCOR) 40 MG tablet Take 1 tablet by mouth Daily. 23  Yes Chapis Hernandez APRN   sildenafil (VIAGRA) 50 MG tablet Take 1 tablet by mouth Daily As Needed for Erectile Dysfunction. Take 30 minutes to 4 hours prior to sexual activity. 23   Chapis Hernandez APRN         Objective     tMax 24 hours:  Temp (24hrs), Av.7 °F (36.5 °C), Min:96.7 °F (35.9 °C), Max:98.8 °F (37.1 °C)    Vital Sign Ranges:  Temp:  [96.7 °F (35.9 °C)-98.8 °F (37.1 °C)] 98.8 °F (37.1 °C)  Heart Rate:  [83-99] 83  Resp:  [9-20] 20  BP: (111-162)/(71-96) 120/78  Intake and Output Last 3 Shifts:  I/O last 3 completed shifts:  In: 1220 [P.O.:220; I.V.:1000]  Out: 25 [Urine:25]      Physical Exam:   General Appearance alert, appears stated age, and cooperative  Head normocephalic, without obvious abnormality and atraumatic  Abdomen no guarding and no rebound tenderness  Male Genitalia 16 French coudé catheter placed with mild difficulty.  I suspect the patient was bhavana his external sphincter  Skin no bleeding, bruising or rash  Neurologic Mental Status orientated to person, place, time and situation    Results Review:     Lab Results (last 24 hours)       Procedure Component Value Units Date/Time    Basic Metabolic Panel [[id number]]  (Abnormal) Collected: 24 0903  "   Specimen: Blood from Arm, Left Updated: 02/02/24 0945     Glucose 111 mg/dL      BUN 10 mg/dL      Creatinine 0.65 mg/dL      Sodium 135 mmol/L      Potassium 4.1 mmol/L      Comment: Slight hemolysis detected by analyzer. Result may be falsely elevated.        Chloride 95 mmol/L      CO2 29.0 mmol/L      Calcium 9.4 mg/dL      BUN/Creatinine Ratio 15.4     Anion Gap 11.0 mmol/L      eGFR 102.6 mL/min/1.73     Narrative:      GFR Normal >60  Chronic Kidney Disease <60  Kidney Failure <15      CBC (No Diff) [[id number]]  (Abnormal) Collected: 02/02/24 0903    Specimen: Blood from Arm, Left Updated: 02/02/24 0916     WBC 6.30 10*3/mm3      RBC 4.37 10*6/mm3      Hemoglobin 13.0 g/dL      Hematocrit 39.7 %      MCV 90.8 fL      MCH 29.7 pg      MCHC 32.7 g/dL      RDW 19.6 %      RDW-SD 64.3 fl      MPV 6.8 fL      Platelets 404 10*3/mm3            No results found for: \"URINECX\"     Imaging Results (Last 7 Days)       ** No results found for the last 168 hours. **            Inpatient Meds:   Scheduled Meds:buPROPion XL, 150 mg, Oral, Nightly  citalopram, 20 mg, Oral, BID  phenazopyridine, 200 mg, Oral, TID With Meals  senna-docusate sodium, 2 tablet, Oral, BID  sodium chloride, 10 mL, Intravenous, Q12H  tamsulosin, 0.4 mg, Oral, BID       Continuous Infusions:lactated ringers, 1,000 mL, Last Rate: 1,000 mL (02/01/24 0954)       PRN Meds:.  senna-docusate sodium **AND** polyethylene glycol **AND** bisacodyl **AND** bisacodyl    Calcium Replacement - Follow Nurse / BPA Driven Protocol    HYDROcodone-acetaminophen    Magnesium Standard Dose Replacement - Follow Nurse / BPA Driven Protocol    Phosphorus Replacement - Follow Nurse / BPA Driven Protocol    Potassium Replacement - Follow Nurse / BPA Driven Protocol    sodium chloride    sodium chloride      Assessment & Plan     Principal Problem:    Paraesophageal hernia    Urinary retention  BPH with lower urinary tract symptoms exacerbated by weakness of acute illness, " decreased mobility, and recent anesthesia.    Plan  Increase Flomax to twice a day  Continue Archer through at least this weekend  If patient remains in house then we will give him a voiding trial Monday  If patient discharged over the weekend then he should follow-up with us next week for voiding trial      I discussed the patient's findings and my recommendations with patient and nursing staff    Thank you for this  consult    Alexei Lara MD  02/02/24  11:30 EST

## 2024-02-03 VITALS
DIASTOLIC BLOOD PRESSURE: 80 MMHG | TEMPERATURE: 98.4 F | HEART RATE: 74 BPM | OXYGEN SATURATION: 93 % | BODY MASS INDEX: 24.02 KG/M2 | SYSTOLIC BLOOD PRESSURE: 131 MMHG | HEIGHT: 70 IN | RESPIRATION RATE: 18 BRPM | WEIGHT: 167.8 LBS

## 2024-02-03 PROBLEM — K44.9 PARAESOPHAGEAL HERNIA: Status: RESOLVED | Noted: 2024-01-05 | Resolved: 2024-02-03

## 2024-02-03 RX ORDER — TAMSULOSIN HYDROCHLORIDE 0.4 MG/1
1 CAPSULE ORAL DAILY
Qty: 30 CAPSULE | Refills: 0 | Status: SHIPPED | OUTPATIENT
Start: 2024-02-03

## 2024-02-03 RX ORDER — HYDROCODONE BITARTRATE AND ACETAMINOPHEN 7.5; 325 MG/1; MG/1
1 TABLET ORAL EVERY 6 HOURS PRN
Qty: 20 TABLET | Refills: 0 | Status: SHIPPED | OUTPATIENT
Start: 2024-02-03

## 2024-02-03 RX ADMIN — CITALOPRAM 20 MG: 20 TABLET, FILM COATED ORAL at 08:42

## 2024-02-03 RX ADMIN — HYDROCODONE BITARTRATE AND ACETAMINOPHEN 1 TABLET: 7.5; 325 TABLET ORAL at 01:53

## 2024-02-03 RX ADMIN — TAMSULOSIN HYDROCHLORIDE 0.4 MG: 0.4 CAPSULE ORAL at 08:42

## 2024-02-03 RX ADMIN — DOCUSATE SODIUM 50 MG AND SENNOSIDES 8.6 MG 2 TABLET: 8.6; 5 TABLET, FILM COATED ORAL at 08:42

## 2024-02-03 RX ADMIN — PHENAZOPYRIDINE 200 MG: 200 TABLET ORAL at 12:51

## 2024-02-03 RX ADMIN — HYDROCODONE BITARTRATE AND ACETAMINOPHEN 1 TABLET: 7.5; 325 TABLET ORAL at 12:51

## 2024-02-03 RX ADMIN — HYDROCODONE BITARTRATE AND ACETAMINOPHEN 1 TABLET: 7.5; 325 TABLET ORAL at 08:42

## 2024-02-03 RX ADMIN — Medication 10 ML: at 08:44

## 2024-02-03 RX ADMIN — PHENAZOPYRIDINE 200 MG: 200 TABLET ORAL at 08:42

## 2024-02-03 NOTE — PLAN OF CARE
Goal Outcome Evaluation:   VSS. Aox4. Up ad jarrett - pt able to carry hawkins bag and ambulate independently. Incisions clean, dry, & intact. Passing minimal flatus. Safety measures in place. Care plan ongoing.

## 2024-02-03 NOTE — PLAN OF CARE
Goal Outcome Evaluation:  Plan of Care Reviewed With: patient           Outcome Evaluation: Pt. discharging home. FC remains in place per orders.

## 2024-02-03 NOTE — PLAN OF CARE
Goal Outcome Evaluation:      Patient able to make needs known. Pain treated per MAR. VSS on room air. Patient ambulates with stand by assist. Personal items and call light with in reach. Plan of care on going.

## 2024-02-03 NOTE — CASE MANAGEMENT/SOCIAL WORK
Case Management Discharge Note      Final Note: home             Final Discharge Disposition Code: 01 - home or self-care

## 2024-02-03 NOTE — DISCHARGE INSTRUCTIONS
Call the office to schedule followup appointment approx 2 wks postop  May shower soap and water but no baths/soaking x 2 weeks  Full liquid diet until followup in the office  No lifting >10-15 lbs x 8 wks  Over the counter stool softener twice daily until off narcotics and having regular bowel movements  Milk of magnesia as needed if still constipated with stool softeners

## 2024-02-05 ENCOUNTER — TELEPHONE (OUTPATIENT)
Dept: SURGERY | Facility: CLINIC | Age: 69
End: 2024-02-05
Payer: MEDICARE

## 2024-02-06 PROBLEM — N40.0 BPH (BENIGN PROSTATIC HYPERPLASIA): Status: ACTIVE | Noted: 2024-02-06

## 2024-02-13 ENCOUNTER — OFFICE VISIT (OUTPATIENT)
Dept: SURGERY | Facility: CLINIC | Age: 69
End: 2024-02-13
Payer: MEDICARE

## 2024-02-13 VITALS
SYSTOLIC BLOOD PRESSURE: 136 MMHG | DIASTOLIC BLOOD PRESSURE: 88 MMHG | HEIGHT: 70 IN | BODY MASS INDEX: 23.48 KG/M2 | OXYGEN SATURATION: 96 % | TEMPERATURE: 97.8 F | HEART RATE: 74 BPM | WEIGHT: 164 LBS

## 2024-02-13 DIAGNOSIS — K44.9 PARAESOPHAGEAL HERNIA: Primary | ICD-10-CM

## 2024-02-13 PROCEDURE — 99024 POSTOP FOLLOW-UP VISIT: CPT | Performed by: STUDENT IN AN ORGANIZED HEALTH CARE EDUCATION/TRAINING PROGRAM

## 2024-02-16 ENCOUNTER — LAB (OUTPATIENT)
Dept: LAB | Facility: HOSPITAL | Age: 69
End: 2024-02-16
Payer: MEDICARE

## 2024-02-16 ENCOUNTER — OFFICE VISIT (OUTPATIENT)
Dept: ONCOLOGY | Facility: CLINIC | Age: 69
End: 2024-02-16
Payer: MEDICARE

## 2024-02-16 VITALS
RESPIRATION RATE: 18 BRPM | BODY MASS INDEX: 23.62 KG/M2 | WEIGHT: 165 LBS | DIASTOLIC BLOOD PRESSURE: 97 MMHG | HEIGHT: 70 IN | OXYGEN SATURATION: 97 % | SYSTOLIC BLOOD PRESSURE: 153 MMHG | TEMPERATURE: 98 F | HEART RATE: 58 BPM

## 2024-02-16 DIAGNOSIS — E61.1 IRON DEFICIENCY: Primary | ICD-10-CM

## 2024-02-16 DIAGNOSIS — D47.2 GAMMOPATHY: Primary | ICD-10-CM

## 2024-02-16 DIAGNOSIS — D47.2 GAMMOPATHY: ICD-10-CM

## 2024-02-16 LAB
BASOPHILS # BLD AUTO: 0.01 10*3/MM3 (ref 0–0.2)
BASOPHILS NFR BLD AUTO: 0.4 % (ref 0–1.5)
DEPRECATED RDW RBC AUTO: 53.6 FL (ref 37–54)
EOSINOPHIL # BLD AUTO: 0.13 10*3/MM3 (ref 0–0.4)
EOSINOPHIL NFR BLD AUTO: 4.7 % (ref 0.3–6.2)
ERYTHROCYTE [DISTWIDTH] IN BLOOD BY AUTOMATED COUNT: 16.5 % (ref 12.3–15.4)
HCT VFR BLD AUTO: 40.1 % (ref 37.5–51)
HGB BLD-MCNC: 13.3 G/DL (ref 13–17.7)
HOLD SPECIMEN: NORMAL
LYMPHOCYTES # BLD AUTO: 1.03 10*3/MM3 (ref 0.7–3.1)
LYMPHOCYTES NFR BLD AUTO: 37.5 % (ref 19.6–45.3)
MCH RBC QN AUTO: 30.4 PG (ref 26.6–33)
MCHC RBC AUTO-ENTMCNC: 33.2 G/DL (ref 31.5–35.7)
MCV RBC AUTO: 91.8 FL (ref 79–97)
MONOCYTES # BLD AUTO: 0.71 10*3/MM3 (ref 0.1–0.9)
MONOCYTES NFR BLD AUTO: 25.8 % (ref 5–12)
NEUTROPHILS NFR BLD AUTO: 0.87 10*3/MM3 (ref 1.7–7)
NEUTROPHILS NFR BLD AUTO: 31.6 % (ref 42.7–76)
PLATELET # BLD AUTO: 523 10*3/MM3 (ref 140–450)
PMV BLD AUTO: 7.9 FL (ref 6–12)
RBC # BLD AUTO: 4.37 10*6/MM3 (ref 4.14–5.8)
WBC NRBC COR # BLD AUTO: 2.75 10*3/MM3 (ref 3.4–10.8)

## 2024-02-16 PROCEDURE — 85025 COMPLETE CBC W/AUTO DIFF WBC: CPT

## 2024-02-16 PROCEDURE — 36415 COLL VENOUS BLD VENIPUNCTURE: CPT

## 2024-02-19 DIAGNOSIS — E61.1 IRON DEFICIENCY: Primary | ICD-10-CM

## 2024-03-26 ENCOUNTER — OFFICE VISIT (OUTPATIENT)
Dept: FAMILY MEDICINE CLINIC | Facility: CLINIC | Age: 69
End: 2024-03-26
Payer: MEDICARE

## 2024-03-26 ENCOUNTER — OFFICE VISIT (OUTPATIENT)
Dept: SURGERY | Facility: CLINIC | Age: 69
End: 2024-03-26
Payer: MEDICARE

## 2024-03-26 VITALS
OXYGEN SATURATION: 98 % | WEIGHT: 166.6 LBS | RESPIRATION RATE: 12 BRPM | HEIGHT: 70 IN | BODY MASS INDEX: 23.85 KG/M2 | DIASTOLIC BLOOD PRESSURE: 82 MMHG | TEMPERATURE: 97.3 F | HEART RATE: 58 BPM | SYSTOLIC BLOOD PRESSURE: 148 MMHG

## 2024-03-26 VITALS
HEART RATE: 65 BPM | WEIGHT: 167 LBS | DIASTOLIC BLOOD PRESSURE: 84 MMHG | TEMPERATURE: 98.4 F | BODY MASS INDEX: 23.91 KG/M2 | OXYGEN SATURATION: 96 % | HEIGHT: 70 IN | SYSTOLIC BLOOD PRESSURE: 133 MMHG

## 2024-03-26 DIAGNOSIS — E78.2 MIXED HYPERLIPIDEMIA: ICD-10-CM

## 2024-03-26 DIAGNOSIS — C85.90 LYMPHOMA, UNSPECIFIED BODY REGION, UNSPECIFIED LYMPHOMA TYPE: ICD-10-CM

## 2024-03-26 DIAGNOSIS — K44.9 PARAESOPHAGEAL HERNIA: Primary | ICD-10-CM

## 2024-03-26 DIAGNOSIS — F43.20 ADJUSTMENT DISORDER, UNSPECIFIED TYPE: ICD-10-CM

## 2024-03-26 DIAGNOSIS — I10 HYPERTENSION, BENIGN: ICD-10-CM

## 2024-03-26 DIAGNOSIS — Z00.00 MEDICARE ANNUAL WELLNESS VISIT, SUBSEQUENT: Primary | ICD-10-CM

## 2024-03-26 PROBLEM — K08.59: Status: RESOLVED | Noted: 2022-10-11 | Resolved: 2024-03-26

## 2024-03-26 PROCEDURE — 3075F SYST BP GE 130 - 139MM HG: CPT | Performed by: STUDENT IN AN ORGANIZED HEALTH CARE EDUCATION/TRAINING PROGRAM

## 2024-03-26 PROCEDURE — 1160F RVW MEDS BY RX/DR IN RCRD: CPT | Performed by: STUDENT IN AN ORGANIZED HEALTH CARE EDUCATION/TRAINING PROGRAM

## 2024-03-26 PROCEDURE — 99024 POSTOP FOLLOW-UP VISIT: CPT | Performed by: STUDENT IN AN ORGANIZED HEALTH CARE EDUCATION/TRAINING PROGRAM

## 2024-03-26 PROCEDURE — 3079F DIAST BP 80-89 MM HG: CPT | Performed by: STUDENT IN AN ORGANIZED HEALTH CARE EDUCATION/TRAINING PROGRAM

## 2024-03-26 PROCEDURE — 1159F MED LIST DOCD IN RCRD: CPT | Performed by: STUDENT IN AN ORGANIZED HEALTH CARE EDUCATION/TRAINING PROGRAM

## 2024-03-26 RX ORDER — HYDROCHLOROTHIAZIDE 25 MG/1
25 TABLET ORAL DAILY
Qty: 90 TABLET | Refills: 3 | Status: SHIPPED | OUTPATIENT
Start: 2024-03-26

## 2024-03-26 RX ORDER — SIMVASTATIN 40 MG
40 TABLET ORAL DAILY
Qty: 90 TABLET | Refills: 3 | Status: SHIPPED | OUTPATIENT
Start: 2024-03-26

## 2024-03-26 RX ORDER — IRBESARTAN 150 MG/1
150 TABLET ORAL NIGHTLY
Qty: 90 TABLET | Refills: 3 | Status: SHIPPED | OUTPATIENT
Start: 2024-03-26

## 2024-03-26 RX ORDER — CITALOPRAM 40 MG/1
40 TABLET ORAL DAILY
Qty: 90 TABLET | Refills: 3 | Status: SHIPPED | OUTPATIENT
Start: 2024-03-26

## 2024-03-26 RX ORDER — BUPROPION HYDROCHLORIDE 150 MG/1
150 TABLET ORAL NIGHTLY
Qty: 90 TABLET | Refills: 3 | Status: SHIPPED | OUTPATIENT
Start: 2024-03-26

## 2024-03-26 NOTE — PROGRESS NOTES
"Subjective   Daniel Cagle is a 68 y.o. male.   Post laparoscopic paraesophageal hernia repair on 2/1/2024.  He is doing very well.  Is off his PPI without any reflux.  Occasionally will get some spasms when he eats however they are minimally symptomatic.    Objective   /84 (BP Location: Left arm, Patient Position: Sitting, Cuff Size: Adult)   Pulse 65   Temp 98.4 °F (36.9 °C) (Infrared)   Ht 177.8 cm (70\")   Wt 75.8 kg (167 lb)   SpO2 96%   BMI 23.96 kg/m²   Physical Exam  Cardiovascular:      Rate and Rhythm: Normal rate.   Pulmonary:      Effort: Pulmonary effort is normal.   Abdominal:      General: Abdomen is flat.      Palpations: Abdomen is soft.   Neurological:      Mental Status: He is alert.         Assessment & Plan   Diagnoses and all orders for this visit:    1. Paraesophageal hernia (Primary)    Doing very well.  Okay for diet as tolerated.  Counseled to introduce new foods slowly and avoid dry foods at the start.  Okay to follow-up with me as needed.    Paul Delgado MD  3/26/2024  1:12 PM EDT     "

## 2024-03-26 NOTE — PROGRESS NOTES
The ABCs of the Annual Wellness Visit  Subsequent Medicare Wellness Visit    Subjective    Daniel Cagle is a 68 y.o. male who presents for a Subsequent Medicare Wellness Visit.    The following portions of the patient's history were reviewed and   updated as appropriate: allergies, current medications, past family history, past medical history, past social history, past surgical history, and problem list.    Compared to one year ago, the patient feels his physical   health is better.    Compared to one year ago, the patient feels his mental   health is the same.    Recent Hospitalizations:  He was admitted within the past 365 days at [facility].       Current Medical Providers:  Patient Care Team:  Chapis Hernandez APRN as PCP - General (Nurse Practitioner)  Martha Espino MD as Consulting Physician (Hematology and Oncology)  Paul Delgado MD as Surgeon (General Surgery)    Outpatient Medications Prior to Visit   Medication Sig Dispense Refill    diclofenac (VOLTAREN) 50 MG EC tablet Take 1 tablet by mouth 2 (Two) Times a Day. 60 tablet 12    ferrous sulfate 325 (65 FE) MG tablet Take 1 tablet by mouth Daily With Breakfast. 30 tablet 6    Loratadine 10 MG capsule Take  by mouth.      sildenafil (VIAGRA) 50 MG tablet Take 1 tablet by mouth Daily As Needed for Erectile Dysfunction. Take 30 minutes to 4 hours prior to sexual activity. 6 tablet 12    buPROPion XL (WELLBUTRIN XL) 150 MG 24 hr tablet Take 1 tablet by mouth Daily. (Patient taking differently: Take 1 tablet by mouth Every Night.) 90 tablet 1    citalopram (CeleXA) 40 MG tablet Take 0.5 tablets by mouth 2 (Two) Times a Day. Taking one tablet daily. Breaking it in half to take half in the AM and half in the evening. 90 tablet 1    hydroCHLOROthiazide (HYDRODIURIL) 25 MG tablet Take 1 tablet by mouth Daily. 90 tablet 3    irbesartan (AVAPRO) 150 MG tablet TAKE 1 TABLET BY MOUTH ONCE DAILY AT NIGHT 90 tablet 0    simvastatin (ZOCOR) 40 MG tablet  Take 1 tablet by mouth Daily. 90 tablet 2    HYDROcodone-acetaminophen (Norco) 7.5-325 MG per tablet Take 1 tablet by mouth Every 6 (Six) Hours As Needed for Moderate Pain. (Patient not taking: Reported on 2/13/2024) 20 tablet 0    pantoprazole (PROTONIX) 40 MG EC tablet Take 1 tablet by mouth Daily. (Patient not taking: Reported on 3/26/2024) 30 tablet 1    tamsulosin (FLOMAX) 0.4 MG capsule 24 hr capsule Take 1 capsule by mouth Daily. (Patient not taking: Reported on 3/26/2024) 30 capsule 0     No facility-administered medications prior to visit.       No opioid medication identified on active medication list. I have reviewed chart for other potential  high risk medication/s and harmful drug interactions in the elderly.        Aspirin is not on active medication list.  Aspirin use is not indicated based on review of current medical condition/s. Risk of harm outweighs potential benefits.  .    Patient Active Problem List   Diagnosis    Adjustment disorder    Allergic rhinitis    Anemia    Benign neoplasm of rectum and anal canal    Congenital hiatus hernia    Degeneration of lumbar or lumbosacral intervertebral disc    Diverticulosis of colon    Erectile dysfunction    Hyperlipidemia    Hypertension, benign    Olecranon bursitis of left elbow    Elevated alkaline phosphatase level    Ganglion cyst of wrist, right    History of esophageal stricture    Leucopenia    Asthma    Benign neoplasm of colon    Diaphragmatic hernia without obstruction or gangrene    History of cold urticaria    Cardiomegaly    Elevated hemidiaphragm    Simple hepatic cyst    Atherosclerosis of abdominal aorta    Bilateral hydrocele    BPH (benign prostatic hyperplasia)     Advance Care Planning   Advance Care Planning     Advance Directive is not on file.  ACP discussion was held with the patient during this visit. Patient has an advance directive (not in EMR), copy requested.     Objective    Vitals:    03/26/24 0826   BP: 148/82  Comment:  "at home 126/76   BP Location: Left arm   Patient Position: Sitting   Cuff Size: Adult   Pulse: 58   Resp: 12   Temp: 97.3 °F (36.3 °C)   TempSrc: Infrared   SpO2: 98%   Weight: 75.6 kg (166 lb 9.6 oz)   Height: 177.8 cm (70\")     Estimated body mass index is 23.9 kg/m² as calculated from the following:    Height as of this encounter: 177.8 cm (70\").    Weight as of this encounter: 75.6 kg (166 lb 9.6 oz).    BMI is within normal parameters. No other follow-up for BMI required.      Does the patient have evidence of cognitive impairment? No          HEALTH RISK ASSESSMENT    Smoking Status:  Social History     Tobacco Use   Smoking Status Former    Current packs/day: 0.00    Average packs/day: 1 pack/day for 5.0 years (5.0 ttl pk-yrs)    Types: Cigarettes    Start date: 1971    Quit date: 1976    Years since quittin.2    Passive exposure: Past   Smokeless Tobacco Never     Alcohol Consumption:  Social History     Substance and Sexual Activity   Alcohol Use No     Fall Risk Screen:    STEADI Fall Risk Assessment was completed, and patient is at LOW risk for falls.Assessment completed on:3/26/2024    Depression Screening:      3/26/2024     8:30 AM   PHQ-2/PHQ-9 Depression Screening   Little Interest or Pleasure in Doing Things 0-->not at all   Feeling Down, Depressed or Hopeless 0-->not at all   PHQ-9: Brief Depression Severity Measure Score 0       Health Habits and Functional and Cognitive Screening:      3/26/2024     8:30 AM   Functional & Cognitive Status   Do you have difficulty preparing food and eating? No   Do you have difficulty bathing yourself, getting dressed or grooming yourself? No   Do you have difficulty using the toilet? No   Do you have difficulty moving around from place to place? No   Do you have trouble with steps or getting out of a bed or a chair? No   Current Diet Well Balanced Diet   Dental Exam Up to date   Eye Exam Up to date   Exercise (times per week) 0 times per week "   Current Exercises Include No Regular Exercise   Do you need help using the phone?  No   Are you deaf or do you have serious difficulty hearing?  No   Do you need help to go to places out of walking distance? No   Do you need help shopping? No   Do you need help preparing meals?  No   Do you need help with housework?  No   Do you need help with laundry? No   Do you need help taking your medications? No   Do you need help managing money? No   Do you ever drive or ride in a car without wearing a seat belt? No   Have you felt unusual stress, anger or loneliness in the last month? No   Who do you live with? Alone   If you need help, do you have trouble finding someone available to you? No   Do you have difficulty concentrating, remembering or making decisions? No       Age-appropriate Screening Schedule:  Refer to the list below for future screening recommendations based on patient's age, sex and/or medical conditions. Orders for these recommended tests are listed in the plan section. The patient has been provided with a written plan.    Health Maintenance   Topic Date Due    Pneumococcal Vaccine 65+ (1 of 2 - PCV) Never done    ZOSTER VACCINE (1 of 2) Never done    RSV Vaccine - Adults (1 - 1-dose 60+ series) Never done    COVID-19 Vaccine (4 - 2023-24 season) 09/01/2023    INFLUENZA VACCINE  03/31/2024 (Originally 8/1/2023)    LIPID PANEL  09/19/2024    ANNUAL WELLNESS VISIT  03/26/2025    TDAP/TD VACCINES (2 - Td or Tdap) 12/07/2030    COLORECTAL CANCER SCREENING  07/21/2033    HEPATITIS C SCREENING  Completed    AAA SCREEN (ONE-TIME)  Completed                  CMS Preventative Services Quick Reference  Risk Factors Identified During Encounter  None Identified  The above risks/problems have been discussed with the patient.  Pertinent information has been shared with the patient in the After Visit Summary.  An After Visit Summary and PPPS were made available to the patient.    Follow Up:   Next Medicare Wellness  "visit to be scheduled in 1 year.       Additional E&M Note during same encounter follows:  Patient has multiple medical problems which are significant and separately identifiable that require additional work above and beyond the Medicare Wellness Visit.      Chief Complaint  Medicare Wellness-subsequent    Subjective        HPI  Daniel Cagle is also being seen today for management of medical conditions, preventive  health and medication refills.     Low 130/126 range.   Last weight 185. Today 166.  Slowly eating more.   Has been on liquid only med.  Has lost muscle mass     Right shoulder/right arm on fire, hands sleeping.   Has gone through PT for shoulder.   Has been going to a chiropractor.     Has occ. Burning sensation to left of shoulder blade - sensation now relieved after surgery.     Follow up with Dr. Delgado today.   He does not see [facility]. No issues.     He is current with Dr. Espino.          Objective   Vital Signs:  /82 (BP Location: Left arm, Patient Position: Sitting, Cuff Size: Adult) Comment: at home 126/76  Pulse 58   Temp 97.3 °F (36.3 °C) (Infrared)   Resp 12   Ht 177.8 cm (70\")   Wt 75.6 kg (166 lb 9.6 oz)   SpO2 98%   BMI 23.90 kg/m²     Physical Exam     The following data was reviewed by: THU Mccarthy on 03/26/2024:  Common labs          1/25/2024    09:39 2/2/2024    09:03 2/16/2024    11:42   Common Labs   Glucose 96  111     BUN 8  10     Creatinine 0.69  0.65     Sodium 134  135     Potassium 4.2  4.1     Chloride 94  95     Calcium 9.4  9.4     WBC 2.97  6.30  2.75    Hemoglobin 14.0  13.0  13.3    Hematocrit 42.8  39.7  40.1    Platelets 754  404  523      Data reviewed : Consultant notes Srinivas           Assessment and Plan   Diagnoses and all orders for this visit:    1. Medicare annual wellness visit, subsequent (Primary)    2. Adjustment disorder, unspecified type  -     buPROPion XL (WELLBUTRIN XL) 150 MG 24 hr tablet; Take 1 tablet by mouth Every Night.  " Dispense: 90 tablet; Refill: 3  -     citalopram (CeleXA) 40 MG tablet; Take 1 tablet by mouth Daily. Taking one tablet daily. Breaking it in half to take half in the AM and half in the evening.  Dispense: 90 tablet; Refill: 3    3. Hypertension, benign  Comments:  Controlled with medication.  Home BP ranges 130s over 70-80.  Orders:  -     hydroCHLOROthiazide 25 MG tablet; Take 1 tablet by mouth Daily.  Dispense: 90 tablet; Refill: 3  -     irbesartan (AVAPRO) 150 MG tablet; Take 1 tablet by mouth Every Night.  Dispense: 90 tablet; Refill: 3    4. Mixed hyperlipidemia  -     simvastatin (ZOCOR) 40 MG tablet; Take 1 tablet by mouth Daily.  Dispense: 90 tablet; Refill: 3    5. Lymphoma, unspecified body region, unspecified lymphoma type  Comments:  Under care of hematology/oncology.             Follow Up   Return in about 6 months (around 9/26/2024) for Recheck labs in six months. .  Patient was given instructions and counseling regarding his condition or for health maintenance advice. Please see specific information pulled into the AVS if appropriate.

## 2024-04-22 ENCOUNTER — OFFICE VISIT (OUTPATIENT)
Dept: ONCOLOGY | Facility: CLINIC | Age: 69
End: 2024-04-22
Payer: MEDICARE

## 2024-04-22 ENCOUNTER — LAB (OUTPATIENT)
Dept: LAB | Facility: HOSPITAL | Age: 69
End: 2024-04-22
Payer: MEDICARE

## 2024-04-22 VITALS
TEMPERATURE: 98 F | DIASTOLIC BLOOD PRESSURE: 81 MMHG | HEART RATE: 58 BPM | BODY MASS INDEX: 23.91 KG/M2 | OXYGEN SATURATION: 98 % | RESPIRATION RATE: 18 BRPM | SYSTOLIC BLOOD PRESSURE: 130 MMHG | WEIGHT: 167 LBS | HEIGHT: 70 IN

## 2024-04-22 DIAGNOSIS — C85.90 LYMPHOMA, UNSPECIFIED BODY REGION, UNSPECIFIED LYMPHOMA TYPE: Primary | ICD-10-CM

## 2024-04-22 DIAGNOSIS — C85.90 LYMPHOMA, UNSPECIFIED BODY REGION, UNSPECIFIED LYMPHOMA TYPE: ICD-10-CM

## 2024-04-22 DIAGNOSIS — E61.1 IRON DEFICIENCY: Primary | ICD-10-CM

## 2024-04-22 LAB
BASOPHILS # BLD AUTO: 0.02 10*3/MM3 (ref 0–0.2)
BASOPHILS NFR BLD AUTO: 0.4 % (ref 0–1.5)
DEPRECATED RDW RBC AUTO: 47.6 FL (ref 37–54)
EOSINOPHIL # BLD AUTO: 0.24 10*3/MM3 (ref 0–0.4)
EOSINOPHIL NFR BLD AUTO: 5.1 % (ref 0.3–6.2)
ERYTHROCYTE [DISTWIDTH] IN BLOOD BY AUTOMATED COUNT: 13.6 % (ref 12.3–15.4)
HCT VFR BLD AUTO: 45.1 % (ref 37.5–51)
HGB BLD-MCNC: 14.7 G/DL (ref 13–17.7)
HOLD SPECIMEN: NORMAL
HOLD SPECIMEN: NORMAL
LYMPHOCYTES # BLD AUTO: 0.98 10*3/MM3 (ref 0.7–3.1)
LYMPHOCYTES NFR BLD AUTO: 20.9 % (ref 19.6–45.3)
MCH RBC QN AUTO: 32.1 PG (ref 26.6–33)
MCHC RBC AUTO-ENTMCNC: 32.6 G/DL (ref 31.5–35.7)
MCV RBC AUTO: 98.5 FL (ref 79–97)
MONOCYTES # BLD AUTO: 0.7 10*3/MM3 (ref 0.1–0.9)
MONOCYTES NFR BLD AUTO: 14.9 % (ref 5–12)
NEUTROPHILS NFR BLD AUTO: 2.76 10*3/MM3 (ref 1.7–7)
NEUTROPHILS NFR BLD AUTO: 58.7 % (ref 42.7–76)
PLATELET # BLD AUTO: 304 10*3/MM3 (ref 140–450)
PMV BLD AUTO: 9.2 FL (ref 6–12)
RBC # BLD AUTO: 4.58 10*6/MM3 (ref 4.14–5.8)
WBC NRBC COR # BLD AUTO: 4.7 10*3/MM3 (ref 3.4–10.8)

## 2024-04-22 PROCEDURE — 99214 OFFICE O/P EST MOD 30 MIN: CPT | Performed by: INTERNAL MEDICINE

## 2024-04-22 PROCEDURE — 3075F SYST BP GE 130 - 139MM HG: CPT | Performed by: INTERNAL MEDICINE

## 2024-04-22 PROCEDURE — 85025 COMPLETE CBC W/AUTO DIFF WBC: CPT

## 2024-04-22 PROCEDURE — 3079F DIAST BP 80-89 MM HG: CPT | Performed by: INTERNAL MEDICINE

## 2024-04-22 PROCEDURE — 36415 COLL VENOUS BLD VENIPUNCTURE: CPT

## 2024-04-22 PROCEDURE — 1126F AMNT PAIN NOTED NONE PRSNT: CPT | Performed by: INTERNAL MEDICINE

## 2024-04-22 NOTE — PROGRESS NOTES
Hematology/Oncology Outpatient Follow Up    PATIENT NAME:Daniel Cagle  :1955  MRN: [id number]  PRIMARY CARE PHYSICIAN: Chapis Hernandez APRN  REFERRING PHYSICIAN: No ref. provider found    Chief Complaint   Patient presents with    Follow-up     Gammopathy        HISTORY OF PRESENT ILLNESS:       This is a 68-year-old male has referred secondary to lymphopenia.  Review of his CBCs from 3/20/2023 white count was 2.9 hemoglobin 13.6 platelets 404 differential as well as 3% neutrophils 16% lymphocytes and absolute lymphocyte count was 0.5.  Over the course of time his white count has remained normal at 5.9 hemoglobin 13.6 platelets 214 with 70% neutrophils lymphocytes 7.1 with absolute lymphocyte count of 0.4.     Patient complains of tiredness for a long time but no night sweats. He has had intentional weight loss.     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     Patient is      Family history with pancreatic cancer at age 58  Brother had  lung cancer    11/10/23: Patient had workup for lymphopenia.  Patient had additional labs including UA which was negative hepatitis B core antibody was negative, methylmalonic acid level was normal at 122, CANDACE was negative, SPEP with HECTOR showed monoclonal protein measured 0.4 g per DL reticulocyte count is normal at 0.8 haptoglobin is high at 259, folate was 16, ferritin was 12, B12 was normal at 551, LDH is 207, viral hepatitis B surface antigen was negative hepatitis B surface antibody was nonreactive, hepatitis A was negative hep C was nonreactive peripheral blood flow cytometry no mature B cells detected in the specimen no other significant immunophenotypic abnormality was seen  2023: Patient was told that he has iron deficiency and was initiated on oral iron supplementation to 25 mg p.o. daily with 6 refills.  UA did not  show hematuria and was referred to GI    12/15/2023 patient had SPEP with HECTOR which showed IgG monoclonal protein.  His IgG level was low at 581, IgA was 40 and IgM was 5 his white count was 4.1 hemoglobin 12.9 and platelets were 363 BUN was 11 creatinine 0.71 most clonal spike was 0.3.  He has normal serum free light chains    Past Medical History:   Diagnosis Date    Adult situational stress disorder     Allergic rhinitis     Anemia     Ankle sprain childhood    Arthritis of back 1984    Lower Spine Degen    Colon polyp 15 YRS AGO    NONE LAST SCOPE    Congenital hiatus hernia     Diverticulitis of colon DECADES AGO    VERY LIMITED    Diverticulosis     Dysphagia     ED (erectile dysfunction)     Hyperlipidemia     Hypertension     Low back strain 1984    pulled lifting    Lumbosacral disc disease 1984    ruptured disc L5-S1    Olecranon bursitis     Rotator cuff syndrome 1999    pulled throwing softball    Tennis elbow 2002 ????    Golfers Elbow from hammering       Past Surgical History:   Procedure Laterality Date    COLONOSCOPY  10/30/2017    Dr. Siddiqui    COLONOSCOPY  11/30/2000    adenoma polyp; Dr. White    COLONOSCOPY  08/18/2008    Dr. Marin; hyperplastic polyp    ENDOSCOPY  12/2023    Patient states it was done in [place].    ENDOSCOPY N/A 1/8/2024    Procedure: ESOPHAGOGASTRODUODENOSCOPY;  Surgeon: Paul Delgado MD;  Location: [facility] ENDOSCOPY;  Service: General;  Laterality: N/A;  5CM Hiatal Hernia    ENDOSCOPY N/A 2/1/2024    Procedure: ESOPHAGOGASTRODUODENOSCOPY;  Surgeon: Paul Delgado MD;  Location: [facility] MAIN OR;  Service: General;  Laterality: N/A;    HERNIA REPAIR      PARAESOPHAGEAL HERNIA REPAIR N/A 2/1/2024    Procedure: PARAESOPHAGEAL HERNIA REPAIR LAPAROSCOPIC;  Surgeon: Paul Delgado MD;  Location: [facility] MAIN OR;  Service: General;  Laterality: N/A;    TOOTH EXTRACTION      TRIGGER POINT INJECTION  1994    Sacroiliac Joint         Current Outpatient Medications:     buPROPion  XL (WELLBUTRIN XL) 150 MG 24 hr tablet, Take 1 tablet by mouth Every Night., Disp: 90 tablet, Rfl: 3    citalopram (CeleXA) 40 MG tablet, Take 1 tablet by mouth Daily. Taking one tablet daily. Breaking it in half to take half in the AM and half in the evening., Disp: 90 tablet, Rfl: 3    diclofenac (VOLTAREN) 50 MG EC tablet, Take 1 tablet by mouth 2 (Two) Times a Day., Disp: 60 tablet, Rfl: 12    ferrous sulfate 325 (65 FE) MG tablet, Take 1 tablet by mouth Daily With Breakfast., Disp: 30 tablet, Rfl: 6    hydroCHLOROthiazide 25 MG tablet, Take 1 tablet by mouth Daily., Disp: 90 tablet, Rfl: 3    irbesartan (AVAPRO) 150 MG tablet, Take 1 tablet by mouth Every Night., Disp: 90 tablet, Rfl: 3    Loratadine 10 MG capsule, Take  by mouth., Disp: , Rfl:     sildenafil (VIAGRA) 50 MG tablet, Take 1 tablet by mouth Daily As Needed for Erectile Dysfunction. Take 30 minutes to 4 hours prior to sexual activity., Disp: 6 tablet, Rfl: 12    simvastatin (ZOCOR) 40 MG tablet, Take 1 tablet by mouth Daily., Disp: 90 tablet, Rfl: 3    Allergies   Allergen Reactions    Cyclobenzaprine Unknown - High Severity     Jerking movements.     Latex Other (See Comments)     Patient states following dental procedure, it causes ulcers in mouth.     Prednisone Other (See Comments)     Made hyper and burning        Family History   Problem Relation Age of Onset    Stroke Father     Hypertension Father         CEREBRAL HEMORAGE    Dislocations Mother         Broke Hip / Replaced    Cancer Sister         Pancriatic Cancer    Cancer Brother         Lung Cancer    Alcohol abuse Brother        Cancer-related family history includes Cancer in his brother and sister.    Social History     Tobacco Use    Smoking status: Former     Current packs/day: 0.00     Average packs/day: 1 pack/day for 5.0 years (5.0 ttl pk-yrs)     Types: Cigarettes     Start date: 1971     Quit date: 1976     Years since quittin.3     Passive exposure: Past     "Smokeless tobacco: Never   Vaping Use    Vaping status: Never Used   Substance Use Topics    Alcohol use: No    Drug use: No       I have reviewed and confirmed the accuracy of the patient's history: Chief complaint, HPI, ROS, and Subjective as entered by the MA/LPN/RN. Martha Espino MD 04/22/24        SUBJECTIVE:       Feels much better.  He remains on oral iron supplementation.      REVIEW OF SYSTEMS:    Review of Systems   Constitutional:  Positive for fatigue.   Respiratory:  Positive for cough.         March improved now the patient   Musculoskeletal:  Positive for back pain.        Chronic back pain       OBJECTIVE:    Vitals:    04/22/24 1202   BP: 130/81   Pulse: 58   Resp: 18   Temp: 98 °F (36.7 °C)   TempSrc: Infrared   SpO2: 98%   Weight: 75.8 kg (167 lb)   Height: 177.8 cm (70\")   PainSc: 0-No pain         Body mass index is 23.96 kg/m².    ECOG  (0) Fully active, able to carry on all predisease performance without restriction    Physical Exam  Vitals and nursing note reviewed.   Constitutional:       General: He is not in acute distress.     Appearance: He is not diaphoretic.   HENT:      Head: Normocephalic and atraumatic.   Eyes:      General: No scleral icterus.        Right eye: No discharge.         Left eye: No discharge.      Conjunctiva/sclera: Conjunctivae normal.   Neck:      Thyroid: No thyromegaly.   Cardiovascular:      Rate and Rhythm: Normal rate and regular rhythm.      Heart sounds: Normal heart sounds.      No friction rub. No gallop.   Pulmonary:      Effort: Pulmonary effort is normal. No respiratory distress.      Breath sounds: No stridor. No wheezing.   Abdominal:      General: Bowel sounds are normal.      Palpations: Abdomen is soft. There is no mass.      Tenderness: There is no abdominal tenderness. There is no guarding or rebound.   Musculoskeletal:         General: No tenderness. Normal range of motion.      Cervical back: Normal range of motion and neck supple. "   Lymphadenopathy:      Cervical: No cervical adenopathy.   Skin:     General: Skin is warm.      Findings: No erythema or rash.   Neurological:      Mental Status: He is alert and oriented to person, place, and time.      Motor: No abnormal muscle tone.   Psychiatric:         Behavior: Behavior normal.       I have reexamined the patient and the results are consistent with the previously documented exam. Martha Espino MD        RECENT LABS  WBC   Date Value Ref Range Status   04/22/2024 4.70 3.40 - 10.80 10*3/mm3 Final   09/19/2023 4.2 3.4 - 10.8 x10E3/uL Final     RBC   Date Value Ref Range Status   04/22/2024 4.58 4.14 - 5.80 10*6/mm3 Final   09/19/2023 4.59 4.14 - 5.80 x10E6/uL Final     Hemoglobin   Date Value Ref Range Status   04/22/2024 14.7 13.0 - 17.7 g/dL Final     Hematocrit   Date Value Ref Range Status   04/22/2024 45.1 37.5 - 51.0 % Final     MCV   Date Value Ref Range Status   04/22/2024 98.5 (H) 79.0 - 97.0 fL Final     MCH   Date Value Ref Range Status   04/22/2024 32.1 26.6 - 33.0 pg Final     MCHC   Date Value Ref Range Status   04/22/2024 32.6 31.5 - 35.7 g/dL Final     RDW   Date Value Ref Range Status   04/22/2024 13.6 12.3 - 15.4 % Final     RDW-SD   Date Value Ref Range Status   04/22/2024 47.6 37.0 - 54.0 fl Final     MPV   Date Value Ref Range Status   04/22/2024 9.2 6.0 - 12.0 fL Final     Platelets   Date Value Ref Range Status   04/22/2024 304 140 - 450 10*3/mm3 Final     Neutrophil %   Date Value Ref Range Status   04/22/2024 58.7 42.7 - 76.0 % Final     Lymphocyte %   Date Value Ref Range Status   04/22/2024 20.9 19.6 - 45.3 % Final     Monocyte %   Date Value Ref Range Status   04/22/2024 14.9 (H) 5.0 - 12.0 % Final     Eosinophil %   Date Value Ref Range Status   04/22/2024 5.1 0.3 - 6.2 % Final     Basophil %   Date Value Ref Range Status   04/22/2024 0.4 0.0 - 1.5 % Final     Immature Grans %   Date Value Ref Range Status   01/25/2024 1.3 (H) 0.0 - 0.5 % Final      Neutrophils, Absolute   Date Value Ref Range Status   04/22/2024 2.76 1.70 - 7.00 10*3/mm3 Final     Lymphocytes, Absolute   Date Value Ref Range Status   04/22/2024 0.98 0.70 - 3.10 10*3/mm3 Final     Monocytes, Absolute   Date Value Ref Range Status   04/22/2024 0.70 0.10 - 0.90 10*3/mm3 Final     Eosinophils, Absolute   Date Value Ref Range Status   04/22/2024 0.24 0.00 - 0.40 10*3/mm3 Final     Basophils, Absolute   Date Value Ref Range Status   04/22/2024 0.02 0.00 - 0.20 10*3/mm3 Final     Immature Grans, Absolute   Date Value Ref Range Status   01/25/2024 0.04 0.00 - 0.05 10*3/mm3 Final     nRBC   Date Value Ref Range Status   01/25/2024 0.0 0.0 - 0.2 /100 WBC Final       Lab Results   Component Value Date    GLUCOSE 111 (H) 02/02/2024    BUN 10 02/02/2024    CREATININE 0.65 (L) 02/02/2024    EGFRIFNONA 93 04/26/2021    EGFRIFAFRI 108 04/26/2021    BCR 15.4 02/02/2024    K 4.1 02/02/2024    CO2 29.0 02/02/2024    CALCIUM 9.4 02/02/2024    PROTENTOTREF 6.5 12/15/2023    ALBUMIN 4.4 12/15/2023    ALBUMIN 3.9 12/15/2023    LABIL2 1.5 12/15/2023    AST 19 12/15/2023    ALT 23 12/15/2023         Assessment & Plan     Lymphoma, unspecified body region, unspecified lymphoma type  - CBC & Differential          ASSESSMENT:         Lymphopenia, chronic and no other cytopenias identified.  Rule drug induced, autoimmune.  Workup was unremarkable for any infection but he has depleted B cells on his flow cytometry.  Will track this over time.  Reviewed the possibilities with him.  His CBC today is normal.  His ANC is up to 2.76  Notes neutropenia today on his CBC with .  The patient also had a recent upper respiratory tract infection was treated with a round of antibiotics and now beginning to improve.  This may be contributing to his low white count.  I did let him know that we will have a short interval follow-up.  If you persist to have abnormal leukocytes, would recommend proceeding with bone marrow biopsy  and imaging studies especially since he also has MGUS .  ANC has improved to normal as of today 4/22/2024  MGUS: Monoclonal gammopathy, M protein 0.4 g per DL, normal serum free light chains.  Will check monoclonal labs again with his next appointment in 3 months  Iron deficiency: Recommended to receive oral iron supplementation for 6 to 9 months.  UA was negative for hematuria.  He had EGD colonoscopy summer 2023.  Will continue oral iron supplementation for additional 4 months and then discontinue August 2024  Absence of B cells on flow cytometry.  His lymphocyte count has improved on his CBC        Plans        Follow-up in 3 months with CBC, monoclonal labs done a week before  Will consider repeating flow cytometry if lymphopenia reoccurs reviewed the above with patient  Reviewed the above with patient  Continue oral iron supplementation till August 2024  Reviewed differential diagnosis with patient  Ultrasound of the abdomen to check for spleen size did not show splenomegaly  All questions answered       Patient verbalized understanding and is in agreement of the above plan.            I spent 30 total minutes, face-to-face, caring for Daniel today. 90% of this time involved counseling and/or coordination of care as documented within this note.

## 2024-07-15 ENCOUNTER — LAB (OUTPATIENT)
Dept: LAB | Facility: HOSPITAL | Age: 69
End: 2024-07-15
Payer: MEDICARE

## 2024-07-15 DIAGNOSIS — C85.90 LYMPHOMA, UNSPECIFIED BODY REGION, UNSPECIFIED LYMPHOMA TYPE: ICD-10-CM

## 2024-07-15 LAB
ALBUMIN SERPL-MCNC: 4.5 G/DL (ref 3.5–5.2)
ALBUMIN/GLOB SERPL: 2 G/DL
ALP SERPL-CCNC: 124 U/L (ref 39–117)
ALT SERPL W P-5'-P-CCNC: 16 U/L (ref 1–41)
ANION GAP SERPL CALCULATED.3IONS-SCNC: 7.9 MMOL/L (ref 5–15)
AST SERPL-CCNC: 16 U/L (ref 1–40)
BASOPHILS # BLD AUTO: 0.02 10*3/MM3 (ref 0–0.2)
BASOPHILS NFR BLD AUTO: 0.5 % (ref 0–1.5)
BILIRUB SERPL-MCNC: 0.4 MG/DL (ref 0–1.2)
BUN SERPL-MCNC: 10 MG/DL (ref 8–23)
BUN/CREAT SERPL: 15.2 (ref 7–25)
CALCIUM SPEC-SCNC: 9.6 MG/DL (ref 8.6–10.5)
CHLORIDE SERPL-SCNC: 96 MMOL/L (ref 98–107)
CO2 SERPL-SCNC: 30.1 MMOL/L (ref 22–29)
CREAT SERPL-MCNC: 0.66 MG/DL (ref 0.76–1.27)
DEPRECATED RDW RBC AUTO: 44.8 FL (ref 37–54)
EGFRCR SERPLBLD CKD-EPI 2021: 101.5 ML/MIN/1.73
EOSINOPHIL # BLD AUTO: 0.17 10*3/MM3 (ref 0–0.4)
EOSINOPHIL NFR BLD AUTO: 4.1 % (ref 0.3–6.2)
ERYTHROCYTE [DISTWIDTH] IN BLOOD BY AUTOMATED COUNT: 12.6 % (ref 12.3–15.4)
GLOBULIN UR ELPH-MCNC: 2.3 GM/DL
GLUCOSE SERPL-MCNC: 95 MG/DL (ref 65–99)
HCT VFR BLD AUTO: 44.5 % (ref 37.5–51)
HGB BLD-MCNC: 14.9 G/DL (ref 13–17.7)
LYMPHOCYTES # BLD AUTO: 0.7 10*3/MM3 (ref 0.7–3.1)
LYMPHOCYTES NFR BLD AUTO: 16.9 % (ref 19.6–45.3)
MCH RBC QN AUTO: 32.8 PG (ref 26.6–33)
MCHC RBC AUTO-ENTMCNC: 33.5 G/DL (ref 31.5–35.7)
MCV RBC AUTO: 98 FL (ref 79–97)
MONOCYTES # BLD AUTO: 0.57 10*3/MM3 (ref 0.1–0.9)
MONOCYTES NFR BLD AUTO: 13.8 % (ref 5–12)
NEUTROPHILS NFR BLD AUTO: 2.68 10*3/MM3 (ref 1.7–7)
NEUTROPHILS NFR BLD AUTO: 64.7 % (ref 42.7–76)
PLATELET # BLD AUTO: 271 10*3/MM3 (ref 140–450)
PMV BLD AUTO: 8.7 FL (ref 6–12)
POTASSIUM SERPL-SCNC: 4.8 MMOL/L (ref 3.5–5.2)
PROT SERPL-MCNC: 6.8 G/DL (ref 6–8.5)
RBC # BLD AUTO: 4.54 10*6/MM3 (ref 4.14–5.8)
SODIUM SERPL-SCNC: 134 MMOL/L (ref 136–145)
WBC NRBC COR # BLD AUTO: 4.14 10*3/MM3 (ref 3.4–10.8)

## 2024-07-15 PROCEDURE — 36415 COLL VENOUS BLD VENIPUNCTURE: CPT

## 2024-07-15 PROCEDURE — 85025 COMPLETE CBC W/AUTO DIFF WBC: CPT

## 2024-07-15 PROCEDURE — 80053 COMPREHEN METABOLIC PANEL: CPT | Performed by: INTERNAL MEDICINE

## 2024-07-16 LAB
ALBUMIN SERPL ELPH-MCNC: 4.1 G/DL (ref 2.9–4.4)
ALBUMIN/GLOB SERPL: 1.7 {RATIO} (ref 0.7–1.7)
ALPHA1 GLOB SERPL ELPH-MCNC: 0.1 G/DL (ref 0–0.4)
ALPHA2 GLOB SERPL ELPH-MCNC: 0.7 G/DL (ref 0.4–1)
B-GLOBULIN SERPL ELPH-MCNC: 0.9 G/DL (ref 0.7–1.3)
GAMMA GLOB SERPL ELPH-MCNC: 0.6 G/DL (ref 0.4–1.8)
GLOBULIN SER CALC-MCNC: 2.4 G/DL (ref 2.2–3.9)
KAPPA LC FREE SER-MCNC: 9.6 MG/L (ref 3.3–19.4)
KAPPA LC FREE/LAMBDA FREE SER: 0.5 {RATIO} (ref 0.26–1.65)
LABORATORY COMMENT REPORT: ABNORMAL
LAMBDA LC FREE SERPL-MCNC: 19.3 MG/L (ref 5.7–26.3)
M PROTEIN SERPL ELPH-MCNC: 0.4 G/DL
PROT PATTERN SERPL ELPH-IMP: ABNORMAL
PROT SERPL-MCNC: 6.5 G/DL (ref 6–8.5)

## 2024-07-17 LAB
IGA SERPL-MCNC: 36 MG/DL (ref 61–437)
IGG SERPL-MCNC: 669 MG/DL (ref 603–1613)
IGM SERPL-MCNC: <5 MG/DL (ref 20–172)
PROT PATTERN SERPL IFE-IMP: ABNORMAL

## 2024-07-18 DIAGNOSIS — D47.2 GAMMOPATHY: Primary | ICD-10-CM

## 2024-07-18 DIAGNOSIS — C85.90 LYMPHOMA, UNSPECIFIED BODY REGION, UNSPECIFIED LYMPHOMA TYPE: ICD-10-CM

## 2024-07-22 ENCOUNTER — OFFICE VISIT (OUTPATIENT)
Dept: LAB | Facility: HOSPITAL | Age: 69
End: 2024-07-22
Payer: MEDICARE

## 2024-07-22 ENCOUNTER — OFFICE VISIT (OUTPATIENT)
Dept: ONCOLOGY | Facility: CLINIC | Age: 69
End: 2024-07-22
Payer: MEDICARE

## 2024-07-22 VITALS
OXYGEN SATURATION: 98 % | HEART RATE: 51 BPM | HEIGHT: 70 IN | DIASTOLIC BLOOD PRESSURE: 86 MMHG | BODY MASS INDEX: 24.34 KG/M2 | WEIGHT: 170 LBS | TEMPERATURE: 98 F | RESPIRATION RATE: 18 BRPM | SYSTOLIC BLOOD PRESSURE: 147 MMHG

## 2024-07-22 DIAGNOSIS — C85.90 LYMPHOMA, UNSPECIFIED BODY REGION, UNSPECIFIED LYMPHOMA TYPE: Primary | ICD-10-CM

## 2024-07-22 DIAGNOSIS — C85.90 LYMPHOMA, UNSPECIFIED BODY REGION, UNSPECIFIED LYMPHOMA TYPE: ICD-10-CM

## 2024-07-22 LAB
BASOPHILS # BLD AUTO: 0.04 10*3/MM3 (ref 0–0.2)
BASOPHILS NFR BLD AUTO: 1 % (ref 0–1.5)
DEPRECATED RDW RBC AUTO: 44.9 FL (ref 37–54)
EOSINOPHIL # BLD AUTO: 0.15 10*3/MM3 (ref 0–0.4)
EOSINOPHIL NFR BLD AUTO: 3.6 % (ref 0.3–6.2)
ERYTHROCYTE [DISTWIDTH] IN BLOOD BY AUTOMATED COUNT: 12.6 % (ref 12.3–15.4)
HCT VFR BLD AUTO: 42.8 % (ref 37.5–51)
HGB BLD-MCNC: 15.2 G/DL (ref 13–17.7)
LYMPHOCYTES # BLD AUTO: 0.7 10*3/MM3 (ref 0.7–3.1)
LYMPHOCYTES NFR BLD AUTO: 16.7 % (ref 19.6–45.3)
MCH RBC QN AUTO: 34.5 PG (ref 26.6–33)
MCHC RBC AUTO-ENTMCNC: 35.5 G/DL (ref 31.5–35.7)
MCV RBC AUTO: 97.1 FL (ref 79–97)
MONOCYTES # BLD AUTO: 0.63 10*3/MM3 (ref 0.1–0.9)
MONOCYTES NFR BLD AUTO: 15 % (ref 5–12)
NEUTROPHILS NFR BLD AUTO: 2.67 10*3/MM3 (ref 1.7–7)
NEUTROPHILS NFR BLD AUTO: 63.7 % (ref 42.7–76)
PLATELET # BLD AUTO: 269 10*3/MM3 (ref 140–450)
PMV BLD AUTO: 8.7 FL (ref 6–12)
RBC # BLD AUTO: 4.41 10*6/MM3 (ref 4.14–5.8)
WBC NRBC COR # BLD AUTO: 4.19 10*3/MM3 (ref 3.4–10.8)

## 2024-07-22 PROCEDURE — 36415 COLL VENOUS BLD VENIPUNCTURE: CPT

## 2024-07-22 PROCEDURE — 99215 OFFICE O/P EST HI 40 MIN: CPT | Performed by: INTERNAL MEDICINE

## 2024-07-22 PROCEDURE — 3077F SYST BP >= 140 MM HG: CPT | Performed by: INTERNAL MEDICINE

## 2024-07-22 PROCEDURE — 1126F AMNT PAIN NOTED NONE PRSNT: CPT | Performed by: INTERNAL MEDICINE

## 2024-07-22 PROCEDURE — 3079F DIAST BP 80-89 MM HG: CPT | Performed by: INTERNAL MEDICINE

## 2024-07-22 PROCEDURE — 85025 COMPLETE CBC W/AUTO DIFF WBC: CPT

## 2024-07-22 NOTE — PROGRESS NOTES
Hematology/Oncology Outpatient Follow Up    PATIENT NAME:Daniel Cagle  :1955  MRN: [id number]  PRIMARY CARE PHYSICIAN: Chapis Hernandez APRN  REFERRING PHYSICIAN: No ref. provider found    Chief Complaint   Patient presents with    Follow-up     Lymphoma, unspecified body region, unspecified lymphoma type            HISTORY OF PRESENT ILLNESS:       This is a 68-year-old male has referred secondary to lymphopenia.  Review of his CBCs from 3/20/2023 white count was 2.9 hemoglobin 13.6 platelets 404 differential as well as 3% neutrophils 16% lymphocytes and absolute lymphocyte count was 0.5.  Over the course of time his white count has remained normal at 5.9 hemoglobin 13.6 platelets 214 with 70% neutrophils lymphocytes 7.1 with absolute lymphocyte count of 0.4.     Patient complains of tiredness for a long time but no night sweats. He has had intentional weight loss.     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     Patient is      Family history with pancreatic cancer at age 58  Brother had  lung cancer    11/10/23: Patient had workup for lymphopenia.  Patient had additional labs including UA which was negative hepatitis B core antibody was negative, methylmalonic acid level was normal at 122, CANDACE was negative, SPEP with HECTOR showed monoclonal protein measured 0.4 g per DL reticulocyte count is normal at 0.8 haptoglobin is high at 259, folate was 16, ferritin was 12, B12 was normal at 551, LDH is 207, viral hepatitis B surface antigen was negative hepatitis B surface antibody was nonreactive, hepatitis A was negative hep C was nonreactive peripheral blood flow cytometry no mature B cells detected in the specimen no other significant immunophenotypic abnormality was seen  2023: Patient was told that he has iron deficiency and was initiated on oral iron  supplementation to 25 mg p.o. daily with 6 refills.  UA did not show hematuria and was referred to GI    12/15/2023 patient had SPEP with HECTOR which showed IgG monoclonal protein.  His IgG level was low at 581, IgA was 40 and IgM was 5 his white count was 4.1 hemoglobin 12.9 and platelets were 363 BUN was 11 creatinine 0.71 most clonal spike was 0.3.  He has normal serum free light chains  Trish 15, 2024: Patient had SPEP with HECTOR which showed a monoclonal protein of 0.4 g per DL unchanged.  Serum free light chains were normal chemistry panel showed a BUN of 10, creatinine 0.66, calcium was normal at 9.6 positive immunoglobulins was low for IgA and IgM and IgG was normal at 669 white count was 4.1 hemoglobin 14.9 MCV was 98 and platelets are 271 this is 4% neutrophils 16% lymphocytes 13% monocytes    Past Medical History:   Diagnosis Date    Adult situational stress disorder     Allergic rhinitis     Anemia     Ankle sprain childhood    Arthritis of back 1984    Lower Spine Degen    Colon polyp 15 YRS AGO    NONE LAST SCOPE    Congenital hiatus hernia     Diverticulitis of colon DECADES AGO    VERY LIMITED    Diverticulosis     Dysphagia     ED (erectile dysfunction)     Hyperlipidemia     Hypertension     Low back strain 1984    pulled lifting    Lumbosacral disc disease 1984    ruptured disc L5-S1    Olecranon bursitis     Rotator cuff syndrome 1999    pulled throwing softball    Tennis elbow 2002 ????    Golfers Elbow from hammering       Past Surgical History:   Procedure Laterality Date    COLONOSCOPY  10/30/2017    Dr. Siddiqui    COLONOSCOPY  11/30/2000    adenoma polyp; Dr. White    COLONOSCOPY  08/18/2008    Dr. Marin; hyperplastic polyp    ENDOSCOPY  12/2023    Patient states it was done in [place].    ENDOSCOPY N/A 1/8/2024    Procedure: ESOPHAGOGASTRODUODENOSCOPY;  Surgeon: Paul Delgado MD;  Location: [facility] ENDOSCOPY;  Service: General;  Laterality: N/A;  5CM Hiatal Hernia    ENDOSCOPY N/A 2/1/2024     Procedure: ESOPHAGOGASTRODUODENOSCOPY;  Surgeon: Paul Delgado MD;  Location: [facility] MAIN OR;  Service: General;  Laterality: N/A;    HERNIA REPAIR      PARAESOPHAGEAL HERNIA REPAIR N/A 2/1/2024    Procedure: PARAESOPHAGEAL HERNIA REPAIR LAPAROSCOPIC;  Surgeon: Paul Delgado MD;  Location: [facility] MAIN OR;  Service: General;  Laterality: N/A;    TOOTH EXTRACTION      TRIGGER POINT INJECTION  1994    Sacroiliac Joint         Current Outpatient Medications:     buPROPion XL (WELLBUTRIN XL) 150 MG 24 hr tablet, Take 1 tablet by mouth Every Night., Disp: 90 tablet, Rfl: 3    citalopram (CeleXA) 40 MG tablet, Take 1 tablet by mouth Daily. Taking one tablet daily. Breaking it in half to take half in the AM and half in the evening., Disp: 90 tablet, Rfl: 3    diclofenac (VOLTAREN) 50 MG EC tablet, Take 1 tablet by mouth 2 (Two) Times a Day., Disp: 60 tablet, Rfl: 12    ferrous sulfate 325 (65 FE) MG tablet, Take 1 tablet by mouth Daily With Breakfast., Disp: 30 tablet, Rfl: 6    hydroCHLOROthiazide 25 MG tablet, Take 1 tablet by mouth Daily., Disp: 90 tablet, Rfl: 3    irbesartan (AVAPRO) 150 MG tablet, Take 1 tablet by mouth Every Night., Disp: 90 tablet, Rfl: 3    Loratadine 10 MG capsule, Take  by mouth., Disp: , Rfl:     sildenafil (VIAGRA) 50 MG tablet, Take 1 tablet by mouth Daily As Needed for Erectile Dysfunction. Take 30 minutes to 4 hours prior to sexual activity., Disp: 6 tablet, Rfl: 12    simvastatin (ZOCOR) 40 MG tablet, Take 1 tablet by mouth Daily., Disp: 90 tablet, Rfl: 3    Allergies   Allergen Reactions    Cyclobenzaprine Unknown - High Severity     Jerking movements.     Latex Other (See Comments)     Patient states following dental procedure, it causes ulcers in mouth.     Prednisone Other (See Comments)     Made hyper and burning        Family History   Problem Relation Age of Onset    Stroke Father     Hypertension Father         CEREBRAL HEMORAGE    Dislocations Mother         Broke Hip /  "Replaced    Cancer Sister         Pancriatic Cancer    Cancer Brother         Lung Cancer    Alcohol abuse Brother        Cancer-related family history includes Cancer in his brother and sister.    Social History     Tobacco Use    Smoking status: Former     Current packs/day: 0.00     Average packs/day: 1 pack/day for 5.0 years (5.0 ttl pk-yrs)     Types: Cigarettes     Start date: 1971     Quit date: 1976     Years since quittin.5     Passive exposure: Past    Smokeless tobacco: Never   Vaping Use    Vaping status: Never Used   Substance Use Topics    Alcohol use: No    Drug use: No     I have reviewed and confirmed the accuracy of the patient's history: Chief complaint, HPI, ROS, and Subjective as entered by the MA/LPN/RN. Martha Espino MD 24        SUBJECTIVE:       Patient denies any new issues.  Discussed his recent lab work.  He has decreased quantitative immunoglobulins unclear etiology but his M protein remains at 0.4 g per DL      REVIEW OF SYSTEMS:    Review of Systems   Constitutional:  Positive for fatigue.   Respiratory:  Positive for cough.         March improved now the patient   Musculoskeletal:  Positive for back pain.        Chronic back pain       OBJECTIVE:    Vitals:    24 0839   BP: 147/86   Pulse: 51   Resp: 18   Temp: 98 °F (36.7 °C)   TempSrc: Infrared   SpO2: 98%   Weight: 77.1 kg (170 lb)   Height: 177.8 cm (70\")   PainSc: 0-No pain           Body mass index is 24.39 kg/m².    ECOG  (0) Fully active, able to carry on all predisease performance without restriction    Physical Exam  Vitals and nursing note reviewed.   Constitutional:       General: He is not in acute distress.     Appearance: He is not diaphoretic.   HENT:      Head: Normocephalic and atraumatic.   Eyes:      General: No scleral icterus.        Right eye: No discharge.         Left eye: No discharge.      Conjunctiva/sclera: Conjunctivae normal.   Neck:      Thyroid: No thyromegaly. "   Cardiovascular:      Rate and Rhythm: Normal rate and regular rhythm.      Heart sounds: Normal heart sounds.      No friction rub. No gallop.   Pulmonary:      Effort: Pulmonary effort is normal. No respiratory distress.      Breath sounds: No stridor. No wheezing.   Abdominal:      General: Bowel sounds are normal.      Palpations: Abdomen is soft. There is no mass.      Tenderness: There is no abdominal tenderness. There is no guarding or rebound.   Musculoskeletal:         General: No tenderness. Normal range of motion.      Cervical back: Normal range of motion and neck supple.   Lymphadenopathy:      Cervical: No cervical adenopathy.   Skin:     General: Skin is warm.      Findings: No erythema or rash.   Neurological:      Mental Status: He is alert and oriented to person, place, and time.      Motor: No abnormal muscle tone.   Psychiatric:         Behavior: Behavior normal.       I have reexamined the patient and the results are consistent with the previously documented exam. Martha Espino MD        RECENT LABS    WBC   Date Value Ref Range Status   07/15/2024 4.14 3.40 - 10.80 10*3/mm3 Final   09/19/2023 4.2 3.4 - 10.8 x10E3/uL Final     RBC   Date Value Ref Range Status   07/15/2024 4.54 4.14 - 5.80 10*6/mm3 Final   09/19/2023 4.59 4.14 - 5.80 x10E6/uL Final     Hemoglobin   Date Value Ref Range Status   07/15/2024 14.9 13.0 - 17.7 g/dL Final     Hematocrit   Date Value Ref Range Status   07/15/2024 44.5 37.5 - 51.0 % Final     MCV   Date Value Ref Range Status   07/15/2024 98.0 (H) 79.0 - 97.0 fL Final     MCH   Date Value Ref Range Status   07/15/2024 32.8 26.6 - 33.0 pg Final     MCHC   Date Value Ref Range Status   07/15/2024 33.5 31.5 - 35.7 g/dL Final     RDW   Date Value Ref Range Status   07/15/2024 12.6 12.3 - 15.4 % Final     RDW-SD   Date Value Ref Range Status   07/15/2024 44.8 37.0 - 54.0 fl Final     MPV   Date Value Ref Range Status   07/15/2024 8.7 6.0 - 12.0 fL Final      Platelets   Date Value Ref Range Status   07/15/2024 271 140 - 450 10*3/mm3 Final     Neutrophil %   Date Value Ref Range Status   07/15/2024 64.7 42.7 - 76.0 % Final     Lymphocyte %   Date Value Ref Range Status   07/15/2024 16.9 (L) 19.6 - 45.3 % Final     Monocyte %   Date Value Ref Range Status   07/15/2024 13.8 (H) 5.0 - 12.0 % Final     Eosinophil %   Date Value Ref Range Status   07/15/2024 4.1 0.3 - 6.2 % Final     Basophil %   Date Value Ref Range Status   07/15/2024 0.5 0.0 - 1.5 % Final     Immature Grans %   Date Value Ref Range Status   01/25/2024 1.3 (H) 0.0 - 0.5 % Final     Neutrophils, Absolute   Date Value Ref Range Status   07/15/2024 2.68 1.70 - 7.00 10*3/mm3 Final     Lymphocytes, Absolute   Date Value Ref Range Status   07/15/2024 0.70 0.70 - 3.10 10*3/mm3 Final     Monocytes, Absolute   Date Value Ref Range Status   07/15/2024 0.57 0.10 - 0.90 10*3/mm3 Final     Eosinophils, Absolute   Date Value Ref Range Status   07/15/2024 0.17 0.00 - 0.40 10*3/mm3 Final     Basophils, Absolute   Date Value Ref Range Status   07/15/2024 0.02 0.00 - 0.20 10*3/mm3 Final     Immature Grans, Absolute   Date Value Ref Range Status   01/25/2024 0.04 0.00 - 0.05 10*3/mm3 Final     nRBC   Date Value Ref Range Status   01/25/2024 0.0 0.0 - 0.2 /100 WBC Final       Lab Results   Component Value Date    GLUCOSE 95 07/15/2024    BUN 10 07/15/2024    CREATININE 0.66 (L) 07/15/2024    EGFRIFNONA 93 04/26/2021    EGFRIFAFRI 108 04/26/2021    BCR 15.2 07/15/2024    K 4.8 07/15/2024    CO2 30.1 (H) 07/15/2024    CALCIUM 9.6 07/15/2024    PROTENTOTREF 6.5 07/15/2024    ALBUMIN 4.5 07/15/2024    ALBUMIN 4.1 07/15/2024    LABIL2 1.7 07/15/2024    AST 16 07/15/2024    ALT 16 07/15/2024         Assessment & Plan     Lymphoma, unspecified body region, unspecified lymphoma type  - CBC & Differential            ASSESSMENT:         Lymphopenia, chronic and no other cytopenias identified.  Rule drug induced, autoimmune.  Workup  was unremarkable for any infection but he has depleted B cells on his flow cytometry.  Will track this over time.  Reviewed the possibilities with him.  His CBC today is normal.  His ANC is up to 2.76.  Flow cytometry ordered today  Hypogammaglobulinemia: Rule out significant disease.  I have recommended to proceed with 24-hour urine electrophoresis and immunofixation assay as well as bone marrow aspiration and biopsy.  Patient is declining bone marrow at this time.  Explained to him the reasons why this is being considered and he understands that this could lead to delay in a significant diagnosis such as multiple myeloma, lymphoproliferative disease.  Notes neutropenia today on his CBC with .  The patient also had a recent upper respiratory tract infection was treated with a round of antibiotics and now beginning to improve.  This may be contributing to his low white count.  I did let him know that we will have a short interval follow-up.  If you persist to have abnormal leukocytes, would recommend proceeding with bone marrow biopsy and imaging studies especially since he also has MGUS .  ANC has improved to normal as of today 4/22/2024  MGUS: Monoclonal gammopathy, M protein 0.4 g per DL, normal serum free light chains.  Will check monoclonal labs again with his next appointment in 3 months  Iron deficiency: Recommended to receive oral iron supplementation for 6 to 9 months.  UA was negative for hematuria.  He had EGD colonoscopy summer 2023.  Will continue oral iron supplementation for additional 4 months and then discontinue August 2024  Absence of B cells on flow cytometry.  His lymphocyte count has improved on his CBC        Plans        24-hour urine electrophoresis and immunofixation assay  Bone marrow aspiration and biopsy recommended  For blood flow cytometry  Follow-up in 2 months  All questions answered  Continue oral iron supplementation till August 2024  Reviewed differential diagnosis with  patient  Ultrasound of the abdomen to check for spleen size did not show splenomegaly  All questions answered       Patient verbalized understanding and is in agreement of the above plan.           . I spent 40 total minutes, discussing potential differential diagnosis with patient, face-to-face, caring for Daniel today. 90% of this time involved counseling and/or coordination of care as documented within this note.

## 2024-07-24 ENCOUNTER — LAB (OUTPATIENT)
Dept: LAB | Facility: HOSPITAL | Age: 69
End: 2024-07-24
Payer: MEDICARE

## 2024-07-24 DIAGNOSIS — C85.90 LYMPHOMA, UNSPECIFIED BODY REGION, UNSPECIFIED LYMPHOMA TYPE: ICD-10-CM

## 2024-07-26 LAB
ALBUMIN 24H MFR UR ELPH: 27.3 %
ALPHA1 GLOB 24H MFR UR ELPH: 7.2 %
ALPHA2 GLOB 24H MFR UR ELPH: 16.1 %
B-GLOBULIN MFR UR ELPH: 24.8 %
CREAT 24H UR-MRATE: 1142 MG/24 HR (ref 1000–2000)
CREAT UR-MCNC: 57.1 MG/DL
GAMMA GLOB 24H MFR UR ELPH: 24.7 %
INTERPRETATION UR IFE-IMP: NORMAL
Lab: NORMAL
M PROTEIN 24H MFR UR ELPH: NORMAL %
PROT UR-MCNC: 8 MG/DL

## 2024-08-02 DIAGNOSIS — C85.90 LYMPHOMA, UNSPECIFIED BODY REGION, UNSPECIFIED LYMPHOMA TYPE: Primary | ICD-10-CM

## 2024-08-02 DIAGNOSIS — D47.2 GAMMOPATHY: ICD-10-CM

## 2024-08-02 DIAGNOSIS — R74.8 ELEVATED ALKALINE PHOSPHATASE LEVEL: ICD-10-CM

## 2024-08-02 DIAGNOSIS — E61.1 IRON DEFICIENCY: ICD-10-CM

## 2024-09-30 ENCOUNTER — OFFICE VISIT (OUTPATIENT)
Dept: FAMILY MEDICINE CLINIC | Facility: CLINIC | Age: 69
End: 2024-09-30
Payer: MEDICARE

## 2024-09-30 VITALS
OXYGEN SATURATION: 98 % | SYSTOLIC BLOOD PRESSURE: 111 MMHG | TEMPERATURE: 97.1 F | WEIGHT: 173.4 LBS | DIASTOLIC BLOOD PRESSURE: 62 MMHG | RESPIRATION RATE: 18 BRPM | HEIGHT: 70 IN | HEART RATE: 54 BPM | BODY MASS INDEX: 24.82 KG/M2

## 2024-09-30 DIAGNOSIS — F43.20 ADJUSTMENT DISORDER, UNSPECIFIED TYPE: ICD-10-CM

## 2024-09-30 DIAGNOSIS — M25.511 CHRONIC RIGHT SHOULDER PAIN: ICD-10-CM

## 2024-09-30 DIAGNOSIS — I10 HYPERTENSION, BENIGN: Primary | ICD-10-CM

## 2024-09-30 DIAGNOSIS — G89.29 CHRONIC RIGHT SHOULDER PAIN: ICD-10-CM

## 2024-09-30 DIAGNOSIS — E78.2 MIXED HYPERLIPIDEMIA: ICD-10-CM

## 2024-09-30 PROCEDURE — 1126F AMNT PAIN NOTED NONE PRSNT: CPT

## 2024-09-30 PROCEDURE — 3074F SYST BP LT 130 MM HG: CPT

## 2024-09-30 PROCEDURE — 99213 OFFICE O/P EST LOW 20 MIN: CPT

## 2024-09-30 PROCEDURE — 3078F DIAST BP <80 MM HG: CPT

## 2024-09-30 RX ORDER — CITALOPRAM HYDROBROMIDE 40 MG/1
40 TABLET ORAL DAILY
Qty: 90 TABLET | Refills: 3 | Status: SHIPPED | OUTPATIENT
Start: 2024-09-30

## 2024-09-30 RX ORDER — SIMVASTATIN 40 MG
40 TABLET ORAL DAILY
Qty: 90 TABLET | Refills: 3 | Status: SHIPPED | OUTPATIENT
Start: 2024-09-30

## 2024-09-30 RX ORDER — BUPROPION HYDROCHLORIDE 150 MG/1
150 TABLET ORAL NIGHTLY
Qty: 90 TABLET | Refills: 3 | Status: SHIPPED | OUTPATIENT
Start: 2024-09-30

## 2024-09-30 RX ORDER — IRBESARTAN 150 MG/1
150 TABLET ORAL NIGHTLY
Qty: 90 TABLET | Refills: 3 | Status: SHIPPED | OUTPATIENT
Start: 2024-09-30

## 2024-09-30 NOTE — PROGRESS NOTES
Office Note     Name: Daniel Cagle    : 1955     MRN: [id number]     Chief Complaint  Follow-up (Recheck labs this visit was from March)    Subjective     History of Present Illness:  Daniel Cagle is a 69 y.o. male who presents today for 6 mos follow up from visit in March to recheck labs.    History of Present Illness    Daniel is here today for follow-up.  He is under the care of hematology/oncology for workup for lymphoma.      Reports history of reactive depression due to a traumatic loss of family member.  He was previously seen by a pastoral counselor Chris Stout.  He reports hesitancy to stay on antidepressant medication long-term and would like a referral to psychiatry for further evaluation of his current depression with possible cognitive behavioral therapy and lieu of for as an adjunct to medication.  He denies any thoughts of suicidal or homicidal ideations.          History of Present Illness      Allergies   Allergen Reactions    Cyclobenzaprine Unknown - High Severity     Jerking movements.     Latex Other (See Comments)     Patient states following dental procedure, it causes ulcers in mouth.     Prednisone Other (See Comments)     Made hyper and burning          Current Outpatient Medications:     buPROPion XL (WELLBUTRIN XL) 150 MG 24 hr tablet, Take 1 tablet by mouth Every Night., Disp: 90 tablet, Rfl: 3    citalopram (CeleXA) 40 MG tablet, Take 1 tablet by mouth Daily. Taking one tablet daily. Breaking it in half to take half in the AM and half in the evening., Disp: 90 tablet, Rfl: 3    diclofenac (VOLTAREN) 50 MG EC tablet, Take 1 tablet by mouth 2 (Two) Times a Day., Disp: 60 tablet, Rfl: 12    ferrous sulfate 325 (65 FE) MG tablet, Take 1 tablet by mouth Daily With Breakfast., Disp: 30 tablet, Rfl: 6    hydroCHLOROthiazide 25 MG tablet, Take 1 tablet by mouth Daily., Disp: 90 tablet, Rfl: 3    irbesartan (AVAPRO) 150 MG tablet, Take 1 tablet by mouth Every Night., Disp: 90  tablet, Rfl: 3    Loratadine 10 MG capsule, Take  by mouth., Disp: , Rfl:     sildenafil (VIAGRA) 50 MG tablet, Take 1 tablet by mouth Daily As Needed for Erectile Dysfunction. Take 30 minutes to 4 hours prior to sexual activity., Disp: 6 tablet, Rfl: 12    simvastatin (ZOCOR) 40 MG tablet, Take 1 tablet by mouth Daily., Disp: 90 tablet, Rfl: 3    Review of Systems   Constitutional:  Negative for chills, fatigue and fever.   HENT: Negative.     Respiratory:  Negative for cough, shortness of breath and wheezing.    Cardiovascular:  Negative for chest pain, palpitations and leg swelling.   Gastrointestinal:  Positive for indigestion. Negative for constipation, diarrhea, nausea, vomiting and GERD.   Musculoskeletal:  Positive for arthralgias.   Psychiatric/Behavioral:  Positive for depressed mood. The patient is nervous/anxious.        Social History     Socioeconomic History    Marital status:    Tobacco Use    Smoking status: Former     Current packs/day: 0.00     Average packs/day: 1 pack/day for 5.0 years (5.0 ttl pk-yrs)     Types: Cigarettes     Start date: 1971     Quit date: 1976     Years since quittin.7     Passive exposure: Past    Smokeless tobacco: Never   Vaping Use    Vaping status: Never Used   Substance and Sexual Activity    Alcohol use: No    Drug use: No    Sexual activity: Not Currently     Partners: Female     Birth control/protection: Condom, Natural family planning/Rhythm       Family History   Problem Relation Age of Onset    Stroke Father     Hypertension Father         CEREBRAL HEMORAGE    Dislocations Mother         Broke Hip / Replaced    Cancer Sister         Pancriatic Cancer    Cancer Brother         Lung Cancer    Alcohol abuse Brother            3/26/2024     8:30 AM   PHQ-2/PHQ-9 Depression Screening   Little Interest or Pleasure in Doing Things 0-->not at all   Feeling Down, Depressed or Hopeless 0-->not at all   PHQ-9: Brief Depression Severity Measure Score 0  "      Fall Risk Screen:  [facility] Fall Risk Assessment was completed, and patient is at LOW risk for falls.Assessment completed on:3/26/2024      Objective     /62 (BP Location: Right arm, Patient Position: Sitting, Cuff Size: Large Adult)   Pulse 54   Temp 97.1 °F (36.2 °C) (Temporal)   Resp 18   Ht 177.8 cm (70\")   Wt 78.7 kg (173 lb 6.4 oz)   SpO2 98%   BMI 24.88 kg/m²     BP Readings from Last 2 Encounters:   09/30/24 111/62   07/22/24 147/86       Wt Readings from Last 2 Encounters:   09/30/24 78.7 kg (173 lb 6.4 oz)   07/22/24 77.1 kg (170 lb)       BMI is within normal parameters. No other follow-up for BMI required.         Physical Exam  Vitals and nursing note reviewed.   Constitutional:       General: He is not in acute distress.     Appearance: Normal appearance. He is well-groomed. He is not ill-appearing.   HENT:      Head: Normocephalic and atraumatic.   Eyes:      General: Lids are normal. Vision grossly intact.   Neck:      Vascular: No carotid bruit.   Cardiovascular:      Rate and Rhythm: Normal rate and regular rhythm.      Heart sounds: Normal heart sounds.   Pulmonary:      Effort: Pulmonary effort is normal.      Breath sounds: Normal breath sounds and air entry.   Musculoskeletal:      Right lower leg: No edema.      Left lower leg: No edema.   Skin:     General: Skin is warm and dry.   Neurological:      Mental Status: He is alert and oriented to person, place, and time. Mental status is at baseline.   Psychiatric:         Attention and Perception: Attention normal.         Mood and Affect: Mood normal.         Behavior: Behavior normal. Behavior is cooperative.       Derm Physical Exam  Physical Exam      Result Review :       Results      Assessment and Plan     Plan      Procedures  Diagnoses and all orders for this visit:    1. Hypertension, benign (Primary)  -     irbesartan (AVAPRO) 150 MG tablet; Take 1 tablet by mouth Every Night.  Dispense: 90 tablet; Refill: 3    2. " Adjustment disorder, unspecified type  -     buPROPion XL (WELLBUTRIN XL) 150 MG 24 hr tablet; Take 1 tablet by mouth Every Night.  Dispense: 90 tablet; Refill: 3  -     citalopram (CeleXA) 40 MG tablet; Take 1 tablet by mouth Daily. Taking one tablet daily. Breaking it in half to take half in the AM and half in the evening.  Dispense: 90 tablet; Refill: 3  -     Ambulatory Referral to Psychiatry    3. Mixed hyperlipidemia  -     simvastatin (ZOCOR) 40 MG tablet; Take 1 tablet by mouth Daily.  Dispense: 90 tablet; Refill: 3    4. Chronic right shoulder pain  -     diclofenac (VOLTAREN) 50 MG EC tablet; Take 1 tablet by mouth 2 (Two) Times a Day.  Dispense: 60 tablet; Refill: 12       Assessment & Plan         Follow Up   Wrapup Tab  No follow-ups on file.     Patient was given instructions and counseling regarding his condition or for health maintenance advice. Please see specific information pulled into the AVS if appropriate.  Hand hygiene was performed during entrance to exam room and following assessment of patient. This document is intended for medical expert use only.       THU Mccarthy, FNP-C  [address]  [facility]  [address] [place] [address] DR DEANDRE ROWLAND [address] [place] [address] [place] [address] [phone]    Patient or patient representative verbalized consent for the use of Ambient Listening during the visit with  THU Mccarthy for chart documentation. 9/30/2024  08:05 EDT

## 2024-10-08 ENCOUNTER — TELEPHONE (OUTPATIENT)
Dept: ONCOLOGY | Facility: CLINIC | Age: 69
End: 2024-10-08
Payer: MEDICARE

## 2024-10-08 NOTE — TELEPHONE ENCOUNTER
Called pt to reschedule his appointment from 09/27/2024 and pt stated that he is not interested in rescheduling his appointment

## 2024-11-26 ENCOUNTER — TELEPHONE (OUTPATIENT)
Dept: FAMILY MEDICINE CLINIC | Facility: CLINIC | Age: 69
End: 2024-11-26
Payer: MEDICARE

## 2024-11-26 NOTE — TELEPHONE ENCOUNTER
Caller: Daniel Cagle    Relationship: Self    Best call back number:     [phone] (Mobile)       What medication are you requesting: PAIN MEDICATION AND ANTI-INFLAMMATORY MEDICATION       What are your current symptoms: PAIN GOING DOWN FROM SHOULDER BLADE TO RIGHT HAND-  WHICH IS GOING NUMB NOW       How long have you been experiencing symptoms:     Have you had these symptoms before:    [x] Yes  [] No    Have you been treated for these symptoms before:   [x] Yes  [] No    If a prescription is needed, what is your preferred pharmacy and phone number: [id number] - [place], [place] - [address] [facility] [address]  - [phone] [facility] [phone] FX     Additional notes:

## 2024-11-27 ENCOUNTER — TELEPHONE (OUTPATIENT)
Dept: ORTHOPEDIC SURGERY | Facility: CLINIC | Age: 69
End: 2024-11-27

## 2024-11-27 ENCOUNTER — TELEPHONE (OUTPATIENT)
Dept: ORTHOPEDIC SURGERY | Facility: CLINIC | Age: 69
End: 2024-11-27
Payer: MEDICARE

## 2024-11-27 DIAGNOSIS — G89.29 CHRONIC RIGHT SHOULDER PAIN: Primary | ICD-10-CM

## 2024-11-27 DIAGNOSIS — M25.511 CHRONIC RIGHT SHOULDER PAIN: Primary | ICD-10-CM

## 2024-11-27 RX ORDER — TRAMADOL HYDROCHLORIDE 50 MG/1
50 TABLET ORAL EVERY 8 HOURS PRN
Qty: 28 TABLET | Refills: 0 | Status: SHIPPED | OUTPATIENT
Start: 2024-11-27

## 2024-11-27 NOTE — TELEPHONE ENCOUNTER
Carl reviewed and he hasn't seen patient since 6/2023.  I called and notified patient he will have to wait until evaluation on Monday.

## 2024-11-27 NOTE — TELEPHONE ENCOUNTER
Caller: Daniel Cagle     Relationship: SELF     Best call back number: [phone]*    What is your medical concern? RIGHT SHOULDER PAIN    How long has this issue been going on? 11/22/24    Is your provider already aware of this issue? NO    Have you been treated for this issue? YES    PT IS CALLING TO SEE WHAT HE CAN DO FOR THE PAIN IN HIS SHOULDER, HE STATES THAT IT HAS A BURNING FEELING THAT GOES DOWN INTO HIS FINGERS.. HE STATES THAT HE HAS BEEN TAKING TYLENOL BUT IT IS NOT HELPING.. HE HAS NOT BEEN ABLE TO SLEEP.. PLEASE ADVISE..

## 2024-11-27 NOTE — TELEPHONE ENCOUNTER
Patient called in asking for something for his pain until his appointment on Monday. He said he has not gotten any real sleep since Friday and his arm feel like it is on fire if he lays down. He did state he has taken quite a bit of Ibuprofen and it is not helping him.

## 2024-12-02 ENCOUNTER — OFFICE VISIT (OUTPATIENT)
Dept: ORTHOPEDIC SURGERY | Facility: CLINIC | Age: 69
End: 2024-12-02
Payer: MEDICARE

## 2024-12-02 VITALS — OXYGEN SATURATION: 97 % | WEIGHT: 173 LBS | BODY MASS INDEX: 24.77 KG/M2 | HEIGHT: 70 IN

## 2024-12-02 DIAGNOSIS — M25.511 CHRONIC RIGHT SHOULDER PAIN: Primary | ICD-10-CM

## 2024-12-02 DIAGNOSIS — G89.29 CHRONIC RIGHT SHOULDER PAIN: Primary | ICD-10-CM

## 2024-12-02 PROCEDURE — 1160F RVW MEDS BY RX/DR IN RCRD: CPT | Performed by: PHYSICIAN ASSISTANT

## 2024-12-02 PROCEDURE — 99213 OFFICE O/P EST LOW 20 MIN: CPT | Performed by: PHYSICIAN ASSISTANT

## 2024-12-02 PROCEDURE — 1159F MED LIST DOCD IN RCRD: CPT | Performed by: PHYSICIAN ASSISTANT

## 2024-12-02 PROCEDURE — 20610 DRAIN/INJ JOINT/BURSA W/O US: CPT | Performed by: PHYSICIAN ASSISTANT

## 2024-12-02 RX ORDER — TRIAMCINOLONE ACETONIDE 40 MG/ML
80 INJECTION, SUSPENSION INTRA-ARTICULAR; INTRAMUSCULAR
Status: COMPLETED | OUTPATIENT
Start: 2024-12-02 | End: 2024-12-02

## 2024-12-02 RX ORDER — LIDOCAINE HYDROCHLORIDE 10 MG/ML
2 INJECTION, SOLUTION EPIDURAL; INFILTRATION; INTRACAUDAL; PERINEURAL
Status: COMPLETED | OUTPATIENT
Start: 2024-12-02 | End: 2024-12-02

## 2024-12-02 RX ADMIN — LIDOCAINE HYDROCHLORIDE 2 ML: 10 INJECTION, SOLUTION EPIDURAL; INFILTRATION; INTRACAUDAL; PERINEURAL at 09:46

## 2024-12-02 RX ADMIN — TRIAMCINOLONE ACETONIDE 80 MG: 40 INJECTION, SUSPENSION INTRA-ARTICULAR; INTRAMUSCULAR at 09:46

## 2024-12-02 NOTE — PROGRESS NOTES
"   Patient ID: Daniel Cagle is a right handed 69 y.o. male  presents for further follow up on right shoulder pain secondary to rotator cuff tendinitis. Reports pain at night. States he has to sleep seated, laying over stacked pillows on an ottoman with his right arm fully suspended/extended hanging toward the gram. Qualifies: burning over shoulder blade, radiating down over the elbow to forearm. Treatment: tylenol (1500mg x 2-3/day), swinging motion/walking, diclofenac BID, no benefit from Ultram. Provocative: stripping wire and pulling the arm backward, cutting meat on cutting board,       Objective:  Ht 177.8 cm (70\")   Wt 78.5 kg (173 lb)   SpO2 97%   BMI 24.82 kg/m²     Physical Examination:       Right shoulder:  Intact skin. No Atrophy. No effusion  Tenderness over the upper mid scapula over spine  Passive fwd flexion 140+ mild pain, abduction 130, ER 20  Active IR L1  Pain but no weakness with o'john & supraspinatus/luigi  Negative Speed  Negative Abreu; negative scarf  Belly press 5/5 lift off 5/5 mild pain  Sensation to light touch over the digits equal bilaterally   strength 5/5 bilaterally  ROM at the elbow, wrist & digits    Imaging:   XR Shoulder 2+ View Right (12/02/2024 09:08)       Assessment:    Diagnoses and all orders for this visit:    1. Chronic right shoulder pain (Primary)  -     XR Shoulder 2+ View Right    Other orders  -     Large Joint Arthrocentesis: R subacromial bursa    Plan: Recommend subacromial steroid injection to provide subjective pain relief and improved function. Hopefully provide relief to reduce his intake of Tylenol. Also, recommend revisiting his PT guided HEP to be performed 3-5 times weekly for the next 3-6 weeks.     Large Joint Arthrocentesis: R subacromial bursa  Date/Time: 12/2/2024 9:46 AM  Consent given by: patient  Site marked: site marked  Timeout: Immediately prior to procedure a time out was called to verify the correct patient, procedure, " equipment, support staff and site/side marked as required   Supporting Documentation  Indications: pain   Procedure Details  Location: shoulder - R subacromial bursa  Preparation: Patient was prepped and draped in the usual sterile fashion  Needle size: 25 G  Approach: posterior  Medications administered: 80 mg triamcinolone acetonide 40 MG/ML; 2 mL lidocaine PF 1% 1 %  Patient tolerance: patient tolerated the procedure well with no immediate complications      BMI is within normal parameters. No other follow-up for BMI required.    Disclaimer: Part of this note may be an electronic transcription/translation of spoken language to printed text using the Dragon Dictation System

## 2025-02-20 ENCOUNTER — OFFICE VISIT (OUTPATIENT)
Dept: ORTHOPEDIC SURGERY | Facility: CLINIC | Age: 70
End: 2025-02-20
Payer: MEDICARE

## 2025-02-20 VITALS — HEIGHT: 70 IN | BODY MASS INDEX: 24.77 KG/M2 | WEIGHT: 173 LBS | OXYGEN SATURATION: 100 %

## 2025-02-20 DIAGNOSIS — S46.911D MUSCLE STRAIN OF RIGHT SCAPULAR REGION, SUBSEQUENT ENCOUNTER: ICD-10-CM

## 2025-02-20 DIAGNOSIS — M89.9 SCAPULAR DYSFUNCTION: Primary | ICD-10-CM

## 2025-02-20 NOTE — PROGRESS NOTES
"   Patient ID: Daniel Cagle is a pleasant 69 y.o. male.  History of Present Illness  The patient is a pleasant 69-year-old male returning for further follow-up on right shoulder pain. He reports experiencing severe pain in his right shoulder, accompanied by burning sensations, numbness, and tingling. Despite significant improvements since his last visit, he continues to experience discomfort with even minor activities such as writing notes. He describes a persistent sensation of pinpricks in his hand, akin to the feeling of it being asleep, and a numb sensation extending down his arm. This has been a constant issue since his last visit. The intensity of his pain has decreased from an 8 to a 1 on a scale of 10. He also reports a burning sensation under his spine and tenderness in the area. He has not engaged in physical therapy since his last visit and has been avoiding strenuous activities due to the irritation they cause. He continues to work, although at a reduced capacity. He recently experienced a flare-up of his symptoms after using an impact wrench and tapping parts apart. He has not taken any Aleve for his symptoms. He reports that a previous steroid injection provided relief by the end of the day, with significant improvement noted after 3 days. However, his symptoms gradually returned to their current state over the following week. He also reports a sensation of tightness in his neck. He has not sought chiropractic care for over a year. He is currently taking diclofenac twice daily.    MEDICATIONS  diclofenac    Objective:  Ht 177.8 cm (70\")   Wt 78.5 kg (173 lb)   SpO2 100%   BMI 24.82 kg/m²     Physical Examination:   right shoulder:  Physical Exam  There is tenderness over the greater tuberosity of the right shoulder and pinpoint tenderness about the inferior medial border of the scapula. No AC joint tenderness on the right, but there is tenderness of the upper trapezius laterally. Right radial pulse " is 1+. Radial pulse palpable, capillary refill is less than 2 seconds in all digits. Paresthesias are present in the palm of the right hand. Sensation to light touch is intact bilaterally. Range of motion at the right wrist and right elbow is grossly intact. Passive forward flexion is 145, abduction is 120 with pain. Belly press is 5/5. Lift off is 5/5. Passive external rotation is 50. Active internal rotation is negative. Speed's test is negative. Abreu' test is negative. Scarf test is negative. Tehama's test is negative. Mild pain, no weakness with supraspinatus and Thaddeus's test.    Imaging:   XR Shoulder 2+ View Right (12/02/2024 09:08)   Findings:  There is no acute fracture or dislocation. There is narrowing and spurring of the acromioclavicular and glenohumeral joints consistent with mild-moderate osteoarthritis. The bony structures are demineralized. The soft tissues are normal.     IMPRESSION:    1.No acute fracture or dislocation.  2.Degenerative changes.     Assessment:    Diagnoses and all orders for this visit:    1. Scapular dysfunction (Primary)  -     Ambulatory Referral to Physical Therapy for Evaluation & Treatment    2. Muscle strain of right scapular region, subsequent encounter     Plan:   Assessment & Plan  1. Right shoulder pain.  The patient's muscular tension has escalated to a level where it is causing subjective pain. His range of motion remains satisfactory, and his rotator cuff appears to be in good condition. Radiographic images do not reveal any abnormalities. However, he is experiencing significant peripheral scapular tension, which is extending into his neck, resulting in asymmetry and imbalance. His shoulder is slightly elevated and drawn up tightly. A few courses of physical therapy are recommended. Dry needling is suggested as a potential treatment to alleviate muscle tension. A referral for physical therapy at [facility] will be provided.    BMI is within normal parameters. No  other follow-up for BMI required.     Patient or patient representative verbalized consent for the use of Ambient Listening during the visit with  Jerson Tovar PA-C for chart documentation. 2/20/2025  09:47 EST    Disclaimer: Part of this note may be an electronic transcription/translation of spoken language to printed text using the Dragon Dictation System

## 2025-03-05 ENCOUNTER — TREATMENT (OUTPATIENT)
Dept: PHYSICAL THERAPY | Facility: CLINIC | Age: 70
End: 2025-03-05
Payer: MEDICARE

## 2025-03-05 DIAGNOSIS — M25.511 CHRONIC RIGHT SHOULDER PAIN: Primary | ICD-10-CM

## 2025-03-05 DIAGNOSIS — G89.29 CHRONIC RIGHT SHOULDER PAIN: Primary | ICD-10-CM

## 2025-03-05 DIAGNOSIS — G25.89 SCAPULAR DYSKINESIS: ICD-10-CM

## 2025-03-05 DIAGNOSIS — M79.18 MYALGIA, SHOULDER REGION: ICD-10-CM

## 2025-03-05 NOTE — PROGRESS NOTES
Physical Therapy Initial Evaluation and Plan of Care                           [address] [place] [address] [address], [address], [place]  [address]    Patient: Daniel Cagle   : 1955  Diagnosis/ICD-10 Code:  Chronic right shoulder pain [M25.511, G89.29]  Referring practitioner: Jerson Tovar PA-C  Date of Initial Visit: 3/5/2025  Today's Date: 3/5/2025  Patient seen for 1 sessions           Subjective Questionnaire: QuickDASH: 28 = 39% impairment      Subjective Evaluation    History of Present Illness  Mechanism of injury: Pt has recurrent R shldr pain. Past tx includes chiropractics (1+ yr ago), steroid inj x 2 ( & 24), & PT in . His most recent injection helped a lot but has not relieved burning under shoulder blade and radiating pain and numbness down to R hand. He woke last night w/feeling of pins & needles in R hand. That happens with any physical repetitive motion. Last week he was using a hammer and that flared up the R shoulder & arm.   He had MRI in  but not recently.  Sxs are constant & moderate      Patient Occupation:  - a lot of heavy repetition Pain  Relieving factors: rest, support and change in position  Progression: improved (80% improved)    Hand dominance: right    Treatments  Previous treatment: injection treatment, physical therapy, chiropractic and medication (diclofenac; steroid inj w/relief)  Current treatment: physical therapy  Patient Goals  Patient goals for therapy: decreased pain, increased motion, increased strength, independence with ADLs/IADLs and return to sport/leisure activities  Patient goal: be able to wax vehicle, long motorcycle rides  (run for the wall)         Objective          Cervical/Thoracic Screen   Cervical range of motion within normal limits with the following exceptions: Rotation R  73  L   61    Active Range of Motion   Left Shoulder   Flexion: 152 degrees   External rotation 0°: 90 degrees   External rotation BTH: T3   Internal rotation  BTB: T7     Right Shoulder   Flexion: 131 degrees   External rotation 0°: 83 degrees   External rotation BTH: T3   Internal rotation BTB: T9     Scapular Mobility   Left Shoulder   Scapular mobility: WFL    Right Shoulder   Scapular mobility: fair  Scapular Mobility with Shoulder to 90° FF   Scapular winging: moderate  Scapular elevation: moderate  Upward rotation: delayed  Downward rotation: inadequate    Strength/Myotome Testing     Left Shoulder     Planes of Motion   Flexion: 5     Isolated Muscles   Lower trapezius: 3   Middle deltoid: 3+     Right Shoulder     Planes of Motion   Flexion: 5     Isolated Muscles   Lower trapezius: 3   Middle deltoid: 3+     Left Wrist/Hand      (2nd hand position)     Trial 1: 75 lbs    Trial 2: 75 lbs    Trial 3: 70 lbs    Average: 73.33 lbs    Right Wrist/Hand      (2nd hand position)     Trial 1: 65 lbs    Trial 2: 70 lbs    Trial 3: 65 lbs    Average: 66.67 lbs    Tests     Right Shoulder   Positive full can.   Negative empty can, Hawkin's, Neer's and painful arc.     Additional Tests Details  ULTT: + R ulnar  - B median, radial, & L ulnar              SCT: Pt was provided with a hard copy of the HEP and instructed in use of it and all listed exercises.  Pt was instructed to cease any exercise that was painful, or that feels as though the form is incorrect.  Pt will return with any questions or difficulty at next session.  Pt acknowledged these instructions and agreed.     Access Code: [email or web address]  URL: [email or web address]  Date: 03/05/2025  Prepared by: Dee David    Exercises  - Shoulder extension with resistance - Neutral  - 2 x daily - 7 x weekly - 1 sets - 10 reps - 10s hold  - Thoracic Extension Mobilization with Noodle  - 2 x daily - 7 x weekly - 1 sets - 10 reps - 10s hold    Assessment & Plan       Assessment  Impairments: abnormal muscle firing, abnormal muscle tone, abnormal or restricted ROM, activity intolerance, lacks appropriate home  exercise program and pain with function   Functional limitations: carrying objects, lifting, sleeping, pushing, uncomfortable because of pain, reaching behind back and reaching overhead   Assessment details: Pt is a 68 yo R handed male presenting to OP PT w/chronic recurrent R shoulder pain radiating up R neck and down R UE. He exhibits aberrant scapulothoracic mechanics & scapulohumeral rhythm. He demonstrates thoracic hypomobility & active & latent trigger points in R>L periscap region. Daniel is able to complete work activities, though has pain with repetitive motion. He will benefit from lower trap mm strengthening and improved activation, in addition to upper trap over recruitment.   Daniel's main goal is to be able to complete cross country motorcycle ride at the beginning of May.   The patient is an appropriate candidate for physical therapy and will benefit from skilled patient intervention in order address the above impairments/limitations.  The plan of care, goals and treatment plan were discussed with the patient and the patient is agreeable to participating in patient services.   Prognosis: good    Goals  Plan Goals: Plan Goals: STG: to be achieved in 3 wks  1. Pt will demonstrate lower trap strength B improved to at least 4+/5 for steering motorcycle.  2. Pt will demonstrate negative R ulnar nerve tension for working overhead.      LTG: to be achieved in 12 wks  1. Pt will improve R  strength to at least 73# for managing motorcycle throttle.  2. Pt will improve QuickDash score from 39% impairment to no greater than 20% impairment to reflect improved activity tolerance and functional mobility.   3. Pt will demonstrate negative R empty can test for using work tools.    Plan  Therapy options: will be seen for skilled therapy services  Planned modality interventions: cryotherapy, dry needling, high voltage pulsed current (pain management), TENS, thermotherapy (hydrocollator packs), ultrasound and  traction  Planned therapy interventions: ADL retraining, balance/weight-bearing training, body mechanics training, fine motor coordination training, flexibility, functional ROM exercises, home exercise program, joint mobilization, manual therapy, motor coordination training, neuromuscular re-education, postural training, soft tissue mobilization, spinal/joint mobilization, strengthening, stretching, therapeutic activities, IADL retraining and transfer training  Frequency: 2x week  Duration in weeks: 12  Treatment plan discussed with: patient    Timed:         Manual Therapy:         mins  16408;     Therapeutic Exercise:    15     mins  85870;     Neuromuscular Hermelindo:        mins  74600;    Therapeutic Activity:          mins  46486;     Gait Training:           mins  78211;     Ultrasound:          mins  40743;    Self-care  __10__ mins 95730  Tests & Measures              mins  85469      Un-Timed:  Electrical Stimulation:    20     mins  65884 ( );  Dry Needling          mins self-pay 20561  Traction          mins 59296  Low Eval          mins  92010  Mod Eval     20     mins  35388  High Eval                            mins  80561  Canal repositioning ___  mins  78990        Timed Treatment:   25   mins   Total Treatment:     65   mins    PT SIGNATURE: Dee David PT, DPT, cert. DN  [place] license # [id number]  Electronically signed by Dee David PT, 03/05/25, 7:23 AM EST    Initial Certification  Certification Period: 3/5/2025 through 6/2/2025  I certify that the therapy services are furnished while this patient is under my care.  The services outlined above are required by this patient, and will be reviewed every 90 days.     PHYSICIAN: Jerson Tovar PA-C    NPI: [id number]                                       DATE: ______________________________________________________________________________________________     Please sign and return via fax to [phone]. Thank you, [facility].

## 2025-03-10 ENCOUNTER — TELEPHONE (OUTPATIENT)
Dept: PHYSICAL THERAPY | Facility: CLINIC | Age: 70
End: 2025-03-10

## 2025-03-13 ENCOUNTER — TREATMENT (OUTPATIENT)
Dept: PHYSICAL THERAPY | Facility: CLINIC | Age: 70
End: 2025-03-13
Payer: MEDICARE

## 2025-03-13 DIAGNOSIS — G89.29 CHRONIC RIGHT SHOULDER PAIN: Primary | ICD-10-CM

## 2025-03-13 DIAGNOSIS — M25.511 CHRONIC RIGHT SHOULDER PAIN: Primary | ICD-10-CM

## 2025-03-13 DIAGNOSIS — G25.89 SCAPULAR DYSKINESIS: ICD-10-CM

## 2025-03-13 DIAGNOSIS — M79.18 MYALGIA, SHOULDER REGION: ICD-10-CM

## 2025-03-13 NOTE — PROGRESS NOTES
Physical Therapy Daily Treatment Note  [address] [facility] [address], [address], [place]  [address]    Patient: Daniel Cagle   : 1955  Diagnosis/ICD-10 Code:  Chronic right shoulder pain [M25.511, G89.29]   Problems Addressed this Visit          Musculoskeletal and Injuries    Chronic right shoulder pain - Primary     Other Visit Diagnoses         Myalgia, shoulder region          Scapular dyskinesis              Diagnoses         Codes Comments      Chronic right shoulder pain    -  Primary ICD-10-CM: M25.511, G89.29  ICD-9-CM: 719.41, 338.29       Myalgia, shoulder region     ICD-10-CM: M79.18  ICD-9-CM: 729.1       Scapular dyskinesis     ICD-10-CM: G25.89  ICD-9-CM: 781.3           Referring practitioner: Jerson Tovar PA-C  Date of Initial Visit: Type: THERAPY  Noted: 3/5/2025  Today's Date: 3/13/2025    VISIT#: 2    Subjective   Daniel reports he's not good at doing his HEP consistently. He did a lot of overhead work the other day and had burning and pain which kept him from sleeping Tuesday night.     Objective     See Exercise, Manual, and Modality Logs for complete treatment.     Assessment/Plan  Daniel cont to have difficulty with scap retraction/depression. R GHJ capsular stiffness persists. Will cont to promote lower trap activation and strength, improving thoracic mobility.   Progress per Plan of Care         Timed:         Manual Therapy:    10     mins  42524;     Therapeutic Exercise:    15     mins  18879;     Neuromuscular Hermelindo:        mins  11791;    Therapeutic Activity:          mins  37082;     Gait Training:           mins  63943;     Ultrasound:          mins  08830;    Ionto                                   mins   99356  Self Care                            mins   51082  Tests & Measures              mins   15272  [place]ib[place]ian stim                    mins   12187    Un-Timed:  Canalith Repos                   mins  66519  Electrical Stimulation:    20     mins  68170 ( );  Dry Needling           mins 17222/16396  Traction          mins 28622  Low Eval          Mins  81667  Mod Eval          Mins  54549  High Eval                            Mins  75944  Re-Eval                               mins  67336    Timed Treatment:   25   mins   Total Treatment:     45   mins    Dee David PT, DPT, cert. DN  Physical Therapist  [place] Lic # [id number]

## 2025-03-20 ENCOUNTER — TREATMENT (OUTPATIENT)
Dept: PHYSICAL THERAPY | Facility: CLINIC | Age: 70
End: 2025-03-20
Payer: MEDICARE

## 2025-03-20 DIAGNOSIS — G89.29 CHRONIC RIGHT SHOULDER PAIN: Primary | ICD-10-CM

## 2025-03-20 DIAGNOSIS — M79.18 MYALGIA, SHOULDER REGION: ICD-10-CM

## 2025-03-20 DIAGNOSIS — M25.511 CHRONIC RIGHT SHOULDER PAIN: Primary | ICD-10-CM

## 2025-03-20 DIAGNOSIS — G25.89 SCAPULAR DYSKINESIS: ICD-10-CM

## 2025-03-20 NOTE — PROGRESS NOTES
Physical Therapy Daily Treatment Note  [address] [facility] [address], [address], [place]  [address]    Patient: Daniel Cagle   : 1955  Diagnosis/ICD-10 Code:  Chronic right shoulder pain [M25.511, G89.29]   Problems Addressed this Visit          Musculoskeletal and Injuries    Chronic right shoulder pain - Primary     Other Visit Diagnoses         Myalgia, shoulder region          Scapular dyskinesis              Diagnoses         Codes Comments      Chronic right shoulder pain    -  Primary ICD-10-CM: M25.511, G89.29  ICD-9-CM: 719.41, 338.29       Myalgia, shoulder region     ICD-10-CM: M79.18  ICD-9-CM: 729.1       Scapular dyskinesis     ICD-10-CM: G25.89  ICD-9-CM: 781.3           Referring practitioner: Jerson Tovar PA-C  Date of Initial Visit: Type: THERAPY  Noted: 3/5/2025  Today's Date: 3/20/2025    VISIT#: 3    Subjective   Daniel reports he'd like to try dry needling today.     Objective     See Exercise, Manual, and Modality Logs for complete treatment.     PT washed hands then donned gloves and cleaned patient skin with alcohol swab prior to administration. Single use needles were used throughout. Pt gives verbal consent. Pt was left with bell in reach if needed.      UT & levator: Pt in prone     1 0.59p13ru needle placed ~1 thumb breadth sup to clavicle line into UT muscle belly inserted 25 mm, performed fanning, angled needle cephelad     1 0.04a37zl needle placed ~1 thumb breadth sup to clavicle line into proximal levator muscle belly inserted 25 mm, performed fanning, angled needle cephelad     0.19g41ep needle placed just superior to scapular spine along medial border into levator muscle belly inserted 25 mm, superolateral angle, performed     0.39z41yn needled w/periosteal pecking to superior angle of L scapula, levator insertion bilaterally      Assessment/Plan  Pt tolerated needling very well overall today. Pt was advised to apply a hot pack x 20 min to any sore areas following today's DN  trial and do gentle stretching & exercising. No bleeding noted upon removal of needles. Finished with active scap postural control on nustep. Proceed with thoracic extension & periscap strengthening next visit.   Progress strengthening /stabilization /functional activity         Timed:         Manual Therapy:         mins  00990;     Therapeutic Exercise:    8     mins  39903;     Neuromuscular Hermelindo:        mins  31754;    Therapeutic Activity:          mins  84052;     Gait Training:           mins  56992;     Ultrasound:          mins  05973;    Ionto                                   mins   56310  Self Care                       15     mins   76883  Tests & Measures              mins   14337  [place] stim                    mins   29643    Un-Timed:  Canalith Repos                   mins  38461  Electrical Stimulation:         mins  04901 ( );  Dry Needling     15     mins 77483 TRIAL  Traction          mins 07383  Low Eval          Mins  22168  Mod Eval          Mins  15687  High Eval                            Mins  28963  Re-Eval                               mins  90600    Timed Treatment:   23   mins   Total Treatment:     38   mins    Dee David, PT, DPT, cert. DN  Physical Therapist  [place] Lic # [id number]

## 2025-03-24 ENCOUNTER — TREATMENT (OUTPATIENT)
Dept: PHYSICAL THERAPY | Facility: CLINIC | Age: 70
End: 2025-03-24
Payer: MEDICARE

## 2025-03-24 DIAGNOSIS — M25.511 CHRONIC RIGHT SHOULDER PAIN: Primary | ICD-10-CM

## 2025-03-24 DIAGNOSIS — M79.18 MYALGIA, SHOULDER REGION: ICD-10-CM

## 2025-03-24 DIAGNOSIS — G25.89 SCAPULAR DYSKINESIS: ICD-10-CM

## 2025-03-24 DIAGNOSIS — G89.29 CHRONIC RIGHT SHOULDER PAIN: Primary | ICD-10-CM

## 2025-03-24 PROCEDURE — 97110 THERAPEUTIC EXERCISES: CPT | Performed by: PHYSICAL THERAPIST

## 2025-03-24 PROCEDURE — G0283 ELEC STIM OTHER THAN WOUND: HCPCS | Performed by: PHYSICAL THERAPIST

## 2025-03-24 PROCEDURE — 97140 MANUAL THERAPY 1/> REGIONS: CPT | Performed by: PHYSICAL THERAPIST

## 2025-03-24 NOTE — PROGRESS NOTES
Physical Therapy Daily Treatment Note      Patient: Daniel Cagle   : 1955  Diagnosis/ICD-10 Code:  Chronic right shoulder pain [M25.511, G89.29]  Referring practitioner: Jerson Tovar PA-C  Date of Initial Visit: Type: THERAPY  Noted: 3/5/2025  Today's Date: 3/24/2025  Patient seen for 4 sessions         Daniel Cagle reports: he had a pretty big response to dry needling with inflammation and tenderness in the R UT and it is just now starting to ease up a bit. Pt. States he is hopeful it was just the process of the trigger point releasing.    Objective   See Exercise, Manual, and Modality Logs for complete treatment.     Assessment/Plan  Pt. Tolerates exercise well today and continues to  benefit form thoracic strengthening and stretching to promote proper shoulder positioning and proper mechanics. Pt. Was given Estim and heat post treatment today with good relief of soreness.    Goals  Plan Goals: Plan Goals: STG: to be achieved in 3 wks  1. Pt will demonstrate lower trap strength B improved to at least 4+/5 for steering motorcycle.  2. Pt will demonstrate negative R ulnar nerve tension for working overhead.       LTG: to be achieved in 12 wks  1. Pt will improve R  strength to at least 73# for managing motorcycle throttle.  2. Pt will improve QuickDash score from 39% impairment to no greater than 20% impairment to reflect improved activity tolerance and functional mobility.   3. Pt will demonstrate negative R empty can test for using work tools.    Progress strengthening /stabilization /functional activity       Timed:         Manual Therapy:    15     mins  52352;     Therapeutic Exercise:    15     mins  26943;       Un-Timed:  Electrical Stimulation:    15     mins  14667 ( );    Timed Treatment:   30   mins   Total Treatment:     45   mins    Neda Obrien PTA  Physical Therapist Assistant License #[id number]

## 2025-03-27 ENCOUNTER — TREATMENT (OUTPATIENT)
Dept: PHYSICAL THERAPY | Facility: CLINIC | Age: 70
End: 2025-03-27
Payer: MEDICARE

## 2025-03-27 DIAGNOSIS — G25.89 SCAPULAR DYSKINESIS: ICD-10-CM

## 2025-03-27 DIAGNOSIS — M25.511 CHRONIC RIGHT SHOULDER PAIN: Primary | ICD-10-CM

## 2025-03-27 DIAGNOSIS — M79.18 MYALGIA, SHOULDER REGION: ICD-10-CM

## 2025-03-27 DIAGNOSIS — G89.29 CHRONIC RIGHT SHOULDER PAIN: Primary | ICD-10-CM

## 2025-03-27 NOTE — PROGRESS NOTES
Physical Therapy Daily Treatment Note  [address], [address], [place]  [address]    Patient: Daniel Cagle   : 1955  Diagnosis/ICD-10 Code:  Chronic right shoulder pain [M25.511, G89.29]   Problems Addressed this Visit          Musculoskeletal and Injuries    Chronic right shoulder pain - Primary     Other Visit Diagnoses         Myalgia, shoulder region          Scapular dyskinesis              Diagnoses         Codes Comments      Chronic right shoulder pain    -  Primary ICD-10-CM: M25.511, G89.29  ICD-9-CM: 719.41, 338.29       Myalgia, shoulder region     ICD-10-CM: M79.18  ICD-9-CM: 729.1       Scapular dyskinesis     ICD-10-CM: G25.89  ICD-9-CM: 781.3           Referring practitioner: Jerson Tovar PA-C  Date of Initial Visit: Type: THERAPY  Noted: 3/5/2025  Today's Date: 3/27/2025    VISIT#: 5    Subjective   Daniel reports he was sore after DN trial which finally subsided about . Though he did experience some relief for a couple days after the soreness subsided. He did some digging yesterday so he is really sore this morning.     Objective     See Exercise, Manual, and Modality Logs for complete treatment.     R 1st rib elevation    Assessment/Plan  Daniel cont to demonstrate B shldr hiking at rest & with activity. He reports feeling R UT tighten with any UE movement, though is able to recruit lower trap with verbal & tactile cues. Intro'd inf rib glides with belt/sheet on this date and advised him to do that daily. Cont per POC to promote thoracic extension, lower trap activation & strength.   Progress per Plan of Care         Timed:         Manual Therapy:    10     mins  00330;     Therapeutic Exercise:    15     mins  97153;     Neuromuscular Hermelindo:        mins  16214;    Therapeutic Activity:     15     mins  07674;     Gait Training:           mins  96246;     Ultrasound:          mins  84334;    Ionto                                   mins   59530  Self Care                             mins   96293  Tests & Measures              mins   22337  [place]an stim                    mins   66554    Un-Timed:  Canalith Repos                   mins  37596  Electrical Stimulation:    20     mins  38333 ( );  Dry Needling          mins 20314/49507  Traction          mins 19481  Low Eval          Mins  40693  Mod Eval          Mins  93877  High Eval                            Mins  81934  Re-Eval                               mins  75052    Timed Treatment:   40   mins   Total Treatment:     60   mins    Dee David, PT, DPT, cert. DN  Physical Therapist  [place] Lic # [id number]

## 2025-03-31 ENCOUNTER — TREATMENT (OUTPATIENT)
Dept: PHYSICAL THERAPY | Facility: CLINIC | Age: 70
End: 2025-03-31
Payer: MEDICARE

## 2025-03-31 DIAGNOSIS — G25.89 SCAPULAR DYSKINESIS: ICD-10-CM

## 2025-03-31 DIAGNOSIS — G89.29 CHRONIC RIGHT SHOULDER PAIN: Primary | ICD-10-CM

## 2025-03-31 DIAGNOSIS — M25.511 CHRONIC RIGHT SHOULDER PAIN: Primary | ICD-10-CM

## 2025-03-31 DIAGNOSIS — M79.18 MYALGIA, SHOULDER REGION: ICD-10-CM

## 2025-03-31 PROCEDURE — 97110 THERAPEUTIC EXERCISES: CPT | Performed by: PHYSICAL THERAPIST

## 2025-03-31 PROCEDURE — 97140 MANUAL THERAPY 1/> REGIONS: CPT | Performed by: PHYSICAL THERAPIST

## 2025-03-31 PROCEDURE — G0283 ELEC STIM OTHER THAN WOUND: HCPCS | Performed by: PHYSICAL THERAPIST

## 2025-03-31 NOTE — PROGRESS NOTES
Physical Therapy Daily Treatment Note      Patient: Daniel Cagle   : 1955  Diagnosis/ICD-10 Code:  Chronic right shoulder pain [M25.511, G89.29]  Referring practitioner: Jerson Tovar PA-C  Date of Initial Visit: Type: THERAPY  Noted: 3/5/2025  Today's Date: 3/31/2025  Patient seen for 6 sessions         Daniel Cagle reports: he continues to have R UE NT symptoms this visit stating he woke up this morning with numbness and tingling in his arm and hand and it took ~1 hour to clear up and he states it feels weak overall. Pt. States he continues to have soreness and tenderness in the R Ut as well this date.    Objective   See Exercise, Manual, and Modality Logs for complete treatment.     Assessment/Plan  Pt. Tolerates treatment well completing exercises well despite soreness. Pt. States he has been doing the stretches and exercises at home and he intends to do dry needling again next session to see if results improve.    Goals  Plan Goals: Plan Goals: STG: to be achieved in 3 wks  1. Pt will demonstrate lower trap strength B improved to at least 4+/5 for steering motorcycle.  2. Pt will demonstrate negative R ulnar nerve tension for working overhead.       LTG: to be achieved in 12 wks  1. Pt will improve R  strength to at least 73# for managing motorcycle throttle.  2. Pt will improve QuickDash score from 39% impairment to no greater than 20% impairment to reflect improved activity tolerance and functional mobility.   3. Pt will demonstrate negative R empty can test for using work tools.    Progress strengthening /stabilization /functional activity       Timed:         Manual Therapy:    15     mins  06435;     Therapeutic Exercise:    15     mins  56809;       Un-Timed:  Electrical Stimulation:    15     mins  54348 ( );      Timed Treatment:   30   mins   Total Treatment:     45   mins    Neda Obrien PTA  Physical Therapist Assistant License #[id number]

## 2025-04-01 ENCOUNTER — OFFICE VISIT (OUTPATIENT)
Dept: FAMILY MEDICINE CLINIC | Facility: CLINIC | Age: 70
End: 2025-04-01
Payer: MEDICARE

## 2025-04-01 VITALS
HEIGHT: 70 IN | OXYGEN SATURATION: 97 % | BODY MASS INDEX: 25.51 KG/M2 | WEIGHT: 178.2 LBS | HEART RATE: 59 BPM | DIASTOLIC BLOOD PRESSURE: 78 MMHG | SYSTOLIC BLOOD PRESSURE: 126 MMHG | RESPIRATION RATE: 18 BRPM | TEMPERATURE: 97 F

## 2025-04-01 DIAGNOSIS — E78.2 MIXED HYPERLIPIDEMIA: ICD-10-CM

## 2025-04-01 DIAGNOSIS — Z00.00 MEDICARE ANNUAL WELLNESS VISIT, SUBSEQUENT: Primary | ICD-10-CM

## 2025-04-01 DIAGNOSIS — G89.29 CHRONIC RIGHT SHOULDER PAIN: ICD-10-CM

## 2025-04-01 DIAGNOSIS — M25.511 CHRONIC RIGHT SHOULDER PAIN: ICD-10-CM

## 2025-04-01 DIAGNOSIS — N52.9 ERECTILE DYSFUNCTION, UNSPECIFIED ERECTILE DYSFUNCTION TYPE: ICD-10-CM

## 2025-04-01 DIAGNOSIS — F43.20 ADJUSTMENT DISORDER, UNSPECIFIED TYPE: ICD-10-CM

## 2025-04-01 DIAGNOSIS — R10.13 EPIGASTRIC PAIN: ICD-10-CM

## 2025-04-01 DIAGNOSIS — Z13.29 SCREENING FOR THYROID DISORDER: ICD-10-CM

## 2025-04-01 DIAGNOSIS — Z12.5 SCREENING FOR PROSTATE CANCER: ICD-10-CM

## 2025-04-01 DIAGNOSIS — I10 HYPERTENSION, BENIGN: ICD-10-CM

## 2025-04-01 DIAGNOSIS — C85.90 LYMPHOMA, UNSPECIFIED BODY REGION, UNSPECIFIED LYMPHOMA TYPE: ICD-10-CM

## 2025-04-01 LAB
BILIRUB BLD-MCNC: NEGATIVE MG/DL
CLARITY, POC: CLEAR
COLOR UR: YELLOW
GLUCOSE UR STRIP-MCNC: NEGATIVE MG/DL
KETONES UR QL: NEGATIVE
LEUKOCYTE EST, POC: NEGATIVE
NITRITE UR-MCNC: NEGATIVE MG/ML
PH UR: 7 [PH] (ref 5–8)
PROT UR STRIP-MCNC: NEGATIVE MG/DL
RBC # UR STRIP: NEGATIVE /UL
SP GR UR: 1.02 (ref 1–1.03)
UROBILINOGEN UR QL: NORMAL

## 2025-04-01 RX ORDER — SILDENAFIL 50 MG/1
100 TABLET, FILM COATED ORAL DAILY PRN
Qty: 6 TABLET | Refills: 12 | Status: SHIPPED | OUTPATIENT
Start: 2025-04-01

## 2025-04-01 RX ORDER — SIMVASTATIN 40 MG
40 TABLET ORAL DAILY
Qty: 90 TABLET | Refills: 3 | Status: SHIPPED | OUTPATIENT
Start: 2025-04-01

## 2025-04-01 RX ORDER — IRBESARTAN 150 MG/1
150 TABLET ORAL NIGHTLY
Qty: 90 TABLET | Refills: 3 | Status: SHIPPED | OUTPATIENT
Start: 2025-04-01

## 2025-04-01 RX ORDER — CITALOPRAM HYDROBROMIDE 40 MG/1
40 TABLET ORAL DAILY
Qty: 90 TABLET | Refills: 3 | Status: SHIPPED | OUTPATIENT
Start: 2025-04-01

## 2025-04-01 RX ORDER — HYDROCHLOROTHIAZIDE 25 MG/1
25 TABLET ORAL DAILY
Qty: 90 TABLET | Refills: 3 | Status: SHIPPED | OUTPATIENT
Start: 2025-04-01

## 2025-04-01 RX ORDER — BUPROPION HYDROCHLORIDE 150 MG/1
150 TABLET ORAL NIGHTLY
Qty: 90 TABLET | Refills: 3 | Status: SHIPPED | OUTPATIENT
Start: 2025-04-01

## 2025-04-01 NOTE — ASSESSMENT & PLAN NOTE
Orders:    buPROPion XL (WELLBUTRIN XL) 150 MG 24 hr tablet; Take 1 tablet by mouth Every Night.    citalopram (CeleXA) 40 MG tablet; Take 1 tablet by mouth Daily. Taking one tablet daily. Breaking it in half to take half in the AM and half in the evening.

## 2025-04-01 NOTE — ASSESSMENT & PLAN NOTE
Orders:    sildenafil (VIAGRA) 50 MG tablet; Take 2 tablets by mouth Daily As Needed for Erectile Dysfunction. Take 30 minutes to 4 hours prior to sexual activity.

## 2025-04-01 NOTE — ASSESSMENT & PLAN NOTE
Hypertension is stable and controlled  Continue current treatment regimen.  Dietary sodium restriction.  Regular aerobic exercise.  Ambulatory blood pressure monitoring.  Blood pressure will be reassessed in 1 year.    Orders:    CBC & Differential    Comprehensive Metabolic Panel    POC Urinalysis Dipstick, Multipro    hydroCHLOROthiazide 25 MG tablet; Take 1 tablet by mouth Daily.    irbesartan (AVAPRO) 150 MG tablet; Take 1 tablet by mouth Every Night.

## 2025-04-01 NOTE — ASSESSMENT & PLAN NOTE
Dry needling by Guy.  Orders:    diclofenac (VOLTAREN) 50 MG EC tablet; Take 1 tablet by mouth 2 (Two) Times a Day.

## 2025-04-01 NOTE — PROGRESS NOTES
Subjective   The ABCs of the Annual Wellness Visit  Medicare Wellness Visit      Daniel Cagle is a 69 y.o. patient who presents for a Medicare Wellness Visit.    The following portions of the patient's history were reviewed and   updated as appropriate: allergies, current medications, past family history, past medical history, past social history, past surgical history, and problem list.    Compared to one year ago, the patient's physical   health is better.  Compared to one year ago, the patient's mental   health is the same.    Recent Hospitalizations:  He was not admitted to the hospital during the last year.     Current Medical Providers:  Patient Care Team:  Chapis Hernandez APRN as PCP - General (Nurse Practitioner)  Martha Espino MD as Consulting Physician (Hematology and Oncology)  Paul Delgado MD as Surgeon (General Surgery)  Dr. Alexei Lara, Urology  Dr. Siddiqui, Gastroenterology  Jerson Tovar PA-C, Orthopedics    Outpatient Medications Prior to Visit   Medication Sig Dispense Refill    buPROPion XL (WELLBUTRIN XL) 150 MG 24 hr tablet Take 1 tablet by mouth Every Night. 90 tablet 3    citalopram (CeleXA) 40 MG tablet Take 1 tablet by mouth Daily. Taking one tablet daily. Breaking it in half to take half in the AM and half in the evening. 90 tablet 3    diclofenac (VOLTAREN) 50 MG EC tablet Take 1 tablet by mouth 2 (Two) Times a Day. 60 tablet 12    hydroCHLOROthiazide 25 MG tablet Take 1 tablet by mouth Daily. 90 tablet 3    irbesartan (AVAPRO) 150 MG tablet Take 1 tablet by mouth Every Night. 90 tablet 3    Loratadine 10 MG capsule Take  by mouth.      sildenafil (VIAGRA) 50 MG tablet Take 1 tablet by mouth Daily As Needed for Erectile Dysfunction. Take 30 minutes to 4 hours prior to sexual activity. 6 tablet 12    simvastatin (ZOCOR) 40 MG tablet Take 1 tablet by mouth Daily. 90 tablet 3    ferrous sulfate 325 (65 FE) MG tablet Take 1 tablet by mouth Daily With Breakfast. 30 tablet 6  "   traMADol (ULTRAM) 50 MG tablet Take 1 tablet by mouth Every 8 (Eight) Hours As Needed for Moderate Pain. 28 tablet 0     No facility-administered medications prior to visit.     No opioid medication identified on active medication list. I have reviewed chart for other potential  high risk medication/s and harmful drug interactions in the elderly.      Aspirin is not on active medication list.  Aspirin use is not indicated based on review of current medical condition/s. Risk of harm outweighs potential benefits.  .    Patient Active Problem List   Diagnosis    Adjustment disorder    Allergic rhinitis    Anemia    Benign neoplasm of rectum and anal canal    Congenital hiatus hernia    Degeneration of lumbar or lumbosacral intervertebral disc    Diverticulosis of colon    Erectile dysfunction    Hyperlipidemia    Hypertension, benign    Olecranon bursitis of left elbow    Elevated alkaline phosphatase level    Ganglion cyst of wrist, right    History of esophageal stricture    Leucopenia    Asthma    Benign neoplasm of colon    Diaphragmatic hernia without obstruction or gangrene    History of cold urticaria    Cardiomegaly    Elevated hemidiaphragm    Simple hepatic cyst    Atherosclerosis of abdominal aorta    Bilateral hydrocele    BPH (benign prostatic hyperplasia)    Chronic right shoulder pain     Advance Care Planning Advance Directive is not on file.  ACP discussion was declined by the patient. Patient has an advance directive (not in EMR), copy requested.            Objective   Vitals:    04/01/25 0834   BP: 126/78   BP Location: Right arm   Patient Position: Sitting   Cuff Size: Large Adult   Pulse: 59   Resp: 18   Temp: 97 °F (36.1 °C)   TempSrc: Temporal   SpO2: 97%   Weight: 80.8 kg (178 lb 3.2 oz)   Height: 177.8 cm (70\")   PainSc: 2    PainLoc: Shoulder       Estimated body mass index is 25.57 kg/m² as calculated from the following:    Height as of this encounter: 177.8 cm (70\").    Weight as of this " encounter: 80.8 kg (178 lb 3.2 oz).    BMI is within normal parameters. No other follow-up for BMI required.           Does the patient have evidence of cognitive impairment? No                                                                                                Health  Risk Assessment    Smoking Status:  Social History     Tobacco Use   Smoking Status Former    Current packs/day: 0.00    Average packs/day: 1 pack/day for 5.0 years (5.0 ttl pk-yrs)    Types: Cigarettes    Start date: 1971    Quit date: 1976    Years since quittin.2    Passive exposure: Past   Smokeless Tobacco Never     Alcohol Consumption:  Social History     Substance and Sexual Activity   Alcohol Use No       Fall Risk Screen  STEADI Fall Risk Assessment was completed, and patient is at LOW risk for falls.Assessment completed on:2025    Depression Screening   Little interest or pleasure in doing things? Not at all   Feeling down, depressed, or hopeless? Not at all   PHQ-2 Total Score 0      Health Habits and Functional and Cognitive Screenin/1/2025     8:25 AM   Functional & Cognitive Status   Do you have difficulty preparing food and eating? No   Do you have difficulty bathing yourself, getting dressed or grooming yourself? No   Do you have difficulty using the toilet? No   Do you have difficulty moving around from place to place? No   Do you have trouble with steps or getting out of a bed or a chair? No   Current Diet Well Balanced Diet   Dental Exam Up to date   Eye Exam Not up to date   Exercise (times per week) 2 times per week   Current Exercises Include Walking;Other   Do you need help using the phone?  No   Are you deaf or do you have serious difficulty hearing?  No   Do you need help to go to places out of walking distance? No   Do you need help shopping? No   Do you need help preparing meals?  No   Do you need help with housework?  No   Do you need help with laundry? No   Do you need help taking your  medications? No   Do you need help managing money? No   Do you ever drive or ride in a car without wearing a seat belt? No   Have you felt unusual stress, anger or loneliness in the last month? No   Who do you live with? Alone   If you need help, do you have trouble finding someone available to you? No   Have you been bothered in the last four weeks by sexual problems? No   Do you have difficulty concentrating, remembering or making decisions? No           Age-appropriate Screening Schedule:  Refer to the list below for future screening recommendations based on patient's age, sex and/or medical conditions. Orders for these recommended tests are listed in the plan section. The patient has been provided with a written plan.    Health Maintenance List  Health Maintenance   Topic Date Due    LIPID PANEL  09/19/2024    Pneumococcal Vaccine 50+ (1 of 2 - PCV) 09/28/2025 (Originally 5/12/1974)    ZOSTER VACCINE (1 of 2) 09/28/2025 (Originally 5/12/2005)    COVID-19 Vaccine (4 - 2024-25 season) 10/01/2025 (Originally 9/1/2024)    INFLUENZA VACCINE  07/01/2025    ANNUAL WELLNESS VISIT  04/01/2026    TDAP/TD VACCINES (2 - Td or Tdap) 12/07/2030    COLORECTAL CANCER SCREENING  07/21/2033    HEPATITIS C SCREENING  Completed    AAA SCREEN ONCE  Completed                                                                                                                                                CMS Preventative Services Quick Reference  Risk Factors Identified During Encounter  None Identified    The above risks/problems have been discussed with the patient.  Pertinent information has been shared with the patient in the After Visit Summary.  An After Visit Summary and PPPS were made available to the patient.    Follow Up:   Next Medicare Wellness visit to be scheduled in 1 year.         Additional E&M Note during same encounter follows:  Patient has additional, significant, and separately identifiable condition(s)/problem(s)  that require work above and beyond the Medicare Wellness Visit     Chief Complaint  Medicare Wellness-subsequent    Subjective    HPI  Daniel is also being seen today for additional medical problem/s including hypertension, hyperlipidemia, erectile dysfunction.       The patient is a 69-year-old male who presents for a Medicare wellness visit.    His blood pressure readings at home have been consistently within the range of 120 to 130 systolic and 70 to 80 diastolic, with minor fluctuations. He recalls an instance where his blood pressure was recorded as 156/93 during a dental visit, despite a home reading of 127 or 128/80. He has a history of extensive dental work.     He reports no fatigue, night sweats, or skin abnormalities such as spots, moles, or rashes. He also reports no thyroid issues, dysphagia, cough, wheezing, dyspnea, chest pain, palpitations, diarrhea, constipation, nausea, vomiting, hematochezia, or hematuria.     He has discontinued his iron supplement but continues to take vitamin D4 and magnesium daily. He has a history of lymphoma and has not had a recent consultation with Dr. Espino and does not have any scheduled appointments with her.     He is currently engaged in physical therapy for shoulder arthritis.  He finds diclofenac effective and takes it twice daily.  He received a cortisone injection last Thanksgiving, which did not provide significant relief. He has undergone dry needling once and plans to repeat the procedure this Thursday.     He reports a sensation of pulling and occasional tenderness at the site of his previous stomach surgery and diaphragm repair, particularly when consuming hot beverages like coffee. He does not experience reflux or indigestion but notes that his vitamins tend to stick at the back of his throat on the right side since his second intubation. He does not report any restriction to eating or pain upon swallowing.  His hydration is primarily maintained through water  "intake, with occasional consumption of half-and-half tea. He abstains from alcohol and tobacco use.     SOCIAL HISTORY  He does not drink alcohol or smoke.    FAMILY HISTORY  He reports that high diastolic blood pressure runs in his family.      Review of Systems   Constitutional:  Negative for activity change, appetite change, chills and fatigue.   Eyes: Negative.    Respiratory:  Negative for cough, shortness of breath and wheezing.    Cardiovascular:  Negative for chest pain, palpitations and leg swelling.   Gastrointestinal: Negative.    Endocrine: Negative.    Genitourinary: Negative.    Musculoskeletal:  Positive for arthralgias and myalgias.   Skin: Negative.    Allergic/Immunologic: Negative.    Neurological: Negative.    Hematological: Negative.    Psychiatric/Behavioral:  Negative for decreased concentration, self-injury, sleep disturbance and suicidal ideas. The patient is not nervous/anxious.           Objective   Vital Signs:  /78 (BP Location: Right arm, Patient Position: Sitting, Cuff Size: Large Adult)   Pulse 59   Temp 97 °F (36.1 °C) (Temporal)   Resp 18   Ht 177.8 cm (70\")   Wt 80.8 kg (178 lb 3.2 oz)   SpO2 97%   BMI 25.57 kg/m²   Physical Exam  Vitals and nursing note reviewed.   Constitutional:       General: He is not in acute distress.     Appearance: Normal appearance. He is well-groomed. He is not ill-appearing.   HENT:      Head: Normocephalic and atraumatic.   Neck:      Thyroid: Thyromegaly present. No thyroid tenderness.      Vascular: No carotid bruit.   Cardiovascular:      Rate and Rhythm: Normal rate and regular rhythm.      Pulses: Normal pulses.      Heart sounds: Normal heart sounds. No murmur heard.  Pulmonary:      Effort: Pulmonary effort is normal.      Breath sounds: Normal breath sounds and air entry.   Musculoskeletal:      Cervical back: No tenderness.      Right lower leg: No edema.      Left lower leg: No edema.   Lymphadenopathy:      Cervical: No cervical " adenopathy.   Skin:     General: Skin is warm and dry.      Capillary Refill: Capillary refill takes less than 2 seconds.          Neurological:      Mental Status: He is alert and oriented to person, place, and time. Mental status is at baseline.   Psychiatric:         Attention and Perception: Attention normal.         Mood and Affect: Mood normal.         Speech: Speech normal.         Behavior: Behavior normal. Behavior is cooperative.           Lungs were auscultated.  Abdomen was examined.    Vital Signs  Blood pressure is normal.            Results             Assessment and Plan Additional age appropriate preventative wellness advice topics were discussed during today's preventative wellness exam(some topics already addressed during AWV portion of the note above):    Physical Activity: Advised cardiovascular activity 150 minutes per week as tolerated. (example brisk walk for 30 minutes, 5 days a week).   Nutrition: Discussed nutrition plan with patient. Information shared in after visit summary. Goal is for a well balanced diet to enhance overall health.   Healthy Weight: Discussed current and goal BMI with patient. Steps to attain this goal discussed. Information shared in after visit summary.   Gun Safety Awareness Discussion: Information Shared in after visit summary.   Tobacco Misuse Discussion: Information shared in after visit summary.   Motor Vehicle Safety Discussion:  Wearing Seatbelt While in Motor Vehicle recommendation. Adhering to posted speed limit recommendation.   Injury Prevention Discussion:  Information shared in after visit summary.        Assessment & Plan  Medicare annual wellness visit, subsequent  Discussed injury prevention, diet and exercise, safe sexual practices, and screening for common diseases. Encouraged use of sunscreen and seatbelts. Avoidance of tobacco encouraged. Limitation or avoidance of alcohol encouraged.      Anticipatory guidance given and routine screenings  recommended with recommendations of yearly dental and eye exams., monitoring of blood pressure, lipids, and screening for colon and lung cancer risks.          Hypertension, benign  Hypertension is stable and controlled  Continue current treatment regimen.  Dietary sodium restriction.  Regular aerobic exercise.  Ambulatory blood pressure monitoring.  Blood pressure will be reassessed in 1 year.    Orders:    CBC & Differential    Comprehensive Metabolic Panel    POC Urinalysis Dipstick, Multipro    hydroCHLOROthiazide 25 MG tablet; Take 1 tablet by mouth Daily.    irbesartan (AVAPRO) 150 MG tablet; Take 1 tablet by mouth Every Night.    Mixed hyperlipidemia   Lipid levels will be checked.  Continue diet, lifestyle and medication.       Orders:    Lipid Panel With LDL / HDL Ratio    simvastatin (ZOCOR) 40 MG tablet; Take 1 tablet by mouth Daily.    Screening for prostate cancer    Orders:    PSA Screen    Screening for thyroid disorder    Orders:    TSH Rfx On Abnormal To Free T4    Adjustment disorder, unspecified type      Orders:    buPROPion XL (WELLBUTRIN XL) 150 MG 24 hr tablet; Take 1 tablet by mouth Every Night.    citalopram (CeleXA) 40 MG tablet; Take 1 tablet by mouth Daily. Taking one tablet daily. Breaking it in half to take half in the AM and half in the evening.    Lymphoma, unspecified body region, unspecified lymphoma type  Follow-up with hematology/oncology.       Erectile dysfunction, unspecified erectile dysfunction type    Orders:    sildenafil (VIAGRA) 50 MG tablet; Take 2 tablets by mouth Daily As Needed for Erectile Dysfunction. Take 30 minutes to 4 hours prior to sexual activity.    Chronic right shoulder pain  Dry needling by Guy.  Orders:    diclofenac (VOLTAREN) 50 MG EC tablet; Take 1 tablet by mouth 2 (Two) Times a Day.    Epigastric pain  Discussed workup including EGD.  Patient prefers to wait at this time.              1. Medicare wellness visit.  His blood pressure readings are  within the normal range. He has been advised to maintain a secure environment at home, ensure the functionality of his smoke detectors, apply sunscreen regularly, and adhere to seatbelt usage while driving. A PSA test will be ordered for prostate cancer screening. Additionally, routine blood work will be conducted. He has been recommended to receive the shingles and pneumonia vaccines. A follow-up appointment with Dr. Espino has been suggested.    2. Shoulder pain.  He is currently undergoing physical therapy for his shoulders and arms. He has been advised to continue with physical therapy and avoid overexertion. A prescription for Valium will be provided to manage pain during dry needling sessions.    3. Gastroesophageal Reflux Disease (GERD).  He reports occasional tenderness and burning in the stomach area, especially after consuming hot drinks. He does not experience reflux or indigestion regularly. A referral to a gastroenterologist was recommended but patient prefers to wait.     4. Medication management.  He is currently taking Wellbutrin and Celexa for mood stabilization, hydrochlorothiazide and irbesartan for blood pressure management, and Zocor for cholesterol control. He does not require a refill for Viagra. A refill for diclofenac will be provided, and he has been advised to take it only as needed to minimize potential kidney impact. He has been instructed not to combine diclofenac with Motrin, ibuprofen, Advil, or Aleve.    PROCEDURE  The patient received a cortisone injection in his back last Thanksgiving.         Follow Up   Return in about 1 year (around 4/1/2026) for Medicare Wellness.  Patient was given instructions and counseling regarding his condition or for health maintenance advice. Please see specific information pulled into the AVS if appropriate.  Patient or patient representative verbalized consent for the use of Ambient Listening during the visit with  THU Mccarthy for chart  documentation. 4/1/2025  08:29 EDT

## 2025-04-01 NOTE — ASSESSMENT & PLAN NOTE
Lipid levels will be checked.  Continue diet, lifestyle and medication.       Orders:    Lipid Panel With LDL / HDL Ratio    simvastatin (ZOCOR) 40 MG tablet; Take 1 tablet by mouth Daily.

## 2025-04-02 ENCOUNTER — PATIENT ROUNDING (BHMG ONLY) (OUTPATIENT)
Dept: FAMILY MEDICINE CLINIC | Facility: CLINIC | Age: 70
End: 2025-04-02
Payer: MEDICARE

## 2025-04-02 NOTE — PROGRESS NOTES
April 2, 2025    Hello, may I speak with Daniel Cagle?    My name is JEVON      I am  with ARIEL BATES [address]  [facility]  [address] [place] [address] [address]  [place] [address] [phone].    Before we get started may I verify your date of birth? 1955    I am calling to officially welcome you to our practice and ask about your recent visit. Is this a good time to talk? yes    Tell me about your visit with us. What things went well?  GOOD AS ALWAYS       We're always looking for ways to make our patients' experiences even better. Do you have recommendations on ways we may improve?  no    Overall were you satisfied with your first visit to our practice? yes       I appreciate you taking the time to speak with me today. Is there anything else I can do for you? no      Thank you, and have a great day.

## 2025-04-03 ENCOUNTER — TREATMENT (OUTPATIENT)
Dept: PHYSICAL THERAPY | Facility: CLINIC | Age: 70
End: 2025-04-03
Payer: MEDICARE

## 2025-04-03 DIAGNOSIS — M79.18 MYALGIA, SHOULDER REGION: ICD-10-CM

## 2025-04-03 DIAGNOSIS — G25.89 SCAPULAR DYSKINESIS: ICD-10-CM

## 2025-04-03 DIAGNOSIS — G89.29 CHRONIC RIGHT SHOULDER PAIN: Primary | ICD-10-CM

## 2025-04-03 DIAGNOSIS — M25.511 CHRONIC RIGHT SHOULDER PAIN: Primary | ICD-10-CM

## 2025-04-03 NOTE — PROGRESS NOTES
Physical Therapy Daily Treatment Note  [address] [facility] [address], [address], [place]  [address]    Patient: Daniel Cagle   : 1955  Diagnosis/ICD-10 Code:  Chronic right shoulder pain [M25.511, G89.29]   Problems Addressed this Visit          Musculoskeletal and Injuries    Chronic right shoulder pain - Primary     Other Visit Diagnoses         Myalgia, shoulder region          Scapular dyskinesis              Diagnoses         Codes Comments      Chronic right shoulder pain    -  Primary ICD-10-CM: M25.511, G89.29  ICD-9-CM: 719.41, 338.29       Myalgia, shoulder region     ICD-10-CM: M79.18  ICD-9-CM: 729.1       Scapular dyskinesis     ICD-10-CM: G25.89  ICD-9-CM: 781.3           Referring practitioner: Jerson Tovar PA-C  Date of Initial Visit: Type: THERAPY  Noted: 3/5/2025  Today's Date: 4/3/2025    VISIT#: 7    Subjective       Objective     See Exercise, Manual, and Modality Logs for complete treatment.     PT washed hands then donned gloves and cleaned patient skin with alcohol swab prior to administration. Single use needles were used throughout. Pt gives verbal consent. Pt was left with bell in reach if needed.       UT & levator: Pt in prone      1 0.80p75xw needle placed ~1 thumb breadth sup to clavicle line into UT muscle belly inserted 25 mm, performed fanning, angled needle cephelad      1 0.58w67nx needle placed ~1 thumb breadth sup to clavicle line into proximal levator muscle belly inserted 25 mm, performed fanning, angled needle cephelad      0.10z25pn needle placed just superior to scapular spine along medial border into levator muscle belly inserted 25 mm, superolateral angle, performed      0.33h95xa needled w/periosteal pecking to superior angle of L scapula, levator insertion bilaterally     Shoulder Impingement     PT utilized 0.3 x 40mm needle inserted 30mm into supraspinatus proximal insertion     PT utilized 0.3 x 40mm needle inserted 30 mm into the musculoendinous junction of  supraspinatus     PT utilized 0.3 x 40mm needle inserted 20 mm into shouldr joint capsule between middle & post deltoid     PT utilized 0.3 x 40mm needle inserted 30 mm into long head of biceps proximal musculotendinous junction     PT utilized 0.3 x 40mm needle inserted 20 mm 3 finger breadths proximal to L lat epicondyle     PT utilized 0.3 x 30mm needle inserted 20 mm inserted into teno osseous junction of infraspinatus       Applied electrical stimulation: 250 microseconds, 2 HZ x 20 min to affected area.  PT present throughout. Verbal consent obtained.       Assessment/Plan  Daniel reported R hand N/T during needling. He was instructed to apply MHP to R shoulder and do gentle movement & stretching to alleviate discomfort.   Progress per Plan of Care         Timed:         Manual Therapy:         mins  84067;     Therapeutic Exercise:         mins  52841;     Neuromuscular Hermelindo:        mins  56204;    Therapeutic Activity:          mins  95606;     Gait Training:           mins  73441;     Ultrasound:          mins  89809;    Ionto                                   mins   92387  Self Care                            mins   79733  Tests & Measures              mins   48192  [place]izen [place] stim                    mins   48939    Un-Timed:  Canalith Repos                   mins  91025  Electrical Stimulation:    20     mins  95628 ( );  Dry Needling     20     mins 50924  Traction          mins 68065  Low Eval          Mins  81804  Mod Eval          Mins  39762  High Eval                            Mins  64982  Re-Eval                               mins  04918    Timed Treatment:   0   mins   Total Treatment:     40   mins    Dee David, PT, DPT, cert. DN  Physical Therapist  [place] Lic # [id number]

## 2025-04-08 ENCOUNTER — TREATMENT (OUTPATIENT)
Dept: PHYSICAL THERAPY | Facility: CLINIC | Age: 70
End: 2025-04-08
Payer: MEDICARE

## 2025-04-08 DIAGNOSIS — G89.29 CHRONIC RIGHT SHOULDER PAIN: Primary | ICD-10-CM

## 2025-04-08 DIAGNOSIS — G25.89 SCAPULAR DYSKINESIS: ICD-10-CM

## 2025-04-08 DIAGNOSIS — M25.511 CHRONIC RIGHT SHOULDER PAIN: Primary | ICD-10-CM

## 2025-04-08 DIAGNOSIS — M79.18 MYALGIA, SHOULDER REGION: ICD-10-CM

## 2025-04-08 PROCEDURE — 97112 NEUROMUSCULAR REEDUCATION: CPT

## 2025-04-08 PROCEDURE — 97110 THERAPEUTIC EXERCISES: CPT

## 2025-04-08 PROCEDURE — G0283 ELEC STIM OTHER THAN WOUND: HCPCS

## 2025-04-08 NOTE — PROGRESS NOTES
Physical Therapy Daily Treatment Note      Patient: Daniel Cagle   : 1955  Diagnosis/ICD-10 Code:  Chronic right shoulder pain [M25.511, G89.29]  Referring practitioner: Jerson Tovar PA-C  Date of Initial Visit: Type: THERAPY  Noted: 3/5/2025  Today's Date: 2025  Patient seen for 8 sessions         Daniel Cagle reports: his pain is still present and aggravating the symptoms down his arm are still In full swing he states he has a cycle of ways to relieve the pain at home but all have a finite effect the tylenol 200's give him about and hour and a half and the cream he received from his doctor gives him about 2 hours he states the needling seems to be adding irritation instead of giving relief.    Objective   See Exercise, Manual, and Modality Logs for complete treatment.     Assessment/Plan  Pt. Tolerates exercise well this visit with good preservation of R UE ROM and his scap strength is doing well also. Pt. completes exercises without pain. P.t was given manual intervention ad IFC this date for release of tension.    Goals  Plan Goals: Plan Goals: STG: to be achieved in 3 wks  1. Pt will demonstrate lower trap strength B improved to at least 4+/5 for steering motorcycle.  2. Pt will demonstrate negative R ulnar nerve tension for working overhead.       LTG: to be achieved in 12 wks  1. Pt will improve R  strength to at least 73# for managing motorcycle throttle.  2. Pt will improve QuickDash score from 39% impairment to no greater than 20% impairment to reflect improved activity tolerance and functional mobility.   3. Pt will demonstrate negative R empty can test for using work tools.    Progress strengthening /stabilization /functional activity       Timed:         Manual Therapy:    10     mins  04736;     Therapeutic Exercise:    10     mins  00282;     Neuromuscular Hermelindo:    10    mins  36415;      Un-Timed:  Electrical Stimulation:   15      mins  46422 ( );        Timed  Treatment:   30   mins   Total Treatment:     45   mins    Neda Obrien PTA  Physical Therapist Assistant License #[id number]

## 2025-04-09 LAB
ALBUMIN SERPL-MCNC: 4.6 G/DL (ref 3.9–4.9)
ALP SERPL-CCNC: 120 IU/L (ref 44–121)
ALT SERPL-CCNC: 21 IU/L (ref 0–44)
AST SERPL-CCNC: 20 IU/L (ref 0–40)
BASOPHILS # BLD AUTO: 0 X10E3/UL (ref 0–0.2)
BASOPHILS NFR BLD AUTO: 1 %
BILIRUB SERPL-MCNC: 0.3 MG/DL (ref 0–1.2)
BUN SERPL-MCNC: 16 MG/DL (ref 8–27)
BUN/CREAT SERPL: 22 (ref 10–24)
CALCIUM SERPL-MCNC: 9.8 MG/DL (ref 8.6–10.2)
CHLORIDE SERPL-SCNC: 97 MMOL/L (ref 96–106)
CHOLEST SERPL-MCNC: 226 MG/DL (ref 100–199)
CO2 SERPL-SCNC: 29 MMOL/L (ref 20–29)
CREAT SERPL-MCNC: 0.72 MG/DL (ref 0.76–1.27)
EGFRCR SERPLBLD CKD-EPI 2021: 99 ML/MIN/1.73
EOSINOPHIL # BLD AUTO: 0.2 X10E3/UL (ref 0–0.4)
EOSINOPHIL NFR BLD AUTO: 5 %
ERYTHROCYTE [DISTWIDTH] IN BLOOD BY AUTOMATED COUNT: 12.1 % (ref 11.6–15.4)
GLOBULIN SER CALC-MCNC: 1.8 G/DL (ref 1.5–4.5)
GLUCOSE SERPL-MCNC: 90 MG/DL (ref 70–99)
HCT VFR BLD AUTO: 45.3 % (ref 37.5–51)
HDLC SERPL-MCNC: 75 MG/DL
HGB BLD-MCNC: 15 G/DL (ref 13–17.7)
IMM GRANULOCYTES # BLD AUTO: 0 X10E3/UL (ref 0–0.1)
IMM GRANULOCYTES NFR BLD AUTO: 0 %
LDLC SERPL CALC-MCNC: 135 MG/DL (ref 0–99)
LDLC/HDLC SERPL: 1.8 RATIO (ref 0–3.6)
LYMPHOCYTES # BLD AUTO: 0.6 X10E3/UL (ref 0.7–3.1)
LYMPHOCYTES NFR BLD AUTO: 14 %
MCH RBC QN AUTO: 33.6 PG (ref 26.6–33)
MCHC RBC AUTO-ENTMCNC: 33.1 G/DL (ref 31.5–35.7)
MCV RBC AUTO: 101 FL (ref 79–97)
MONOCYTES # BLD AUTO: 0.6 X10E3/UL (ref 0.1–0.9)
MONOCYTES NFR BLD AUTO: 14 %
NEUTROPHILS # BLD AUTO: 2.9 X10E3/UL (ref 1.4–7)
NEUTROPHILS NFR BLD AUTO: 66 %
PLATELET # BLD AUTO: 351 X10E3/UL (ref 150–450)
POTASSIUM SERPL-SCNC: 5 MMOL/L (ref 3.5–5.2)
PROT SERPL-MCNC: 6.4 G/DL (ref 6–8.5)
PSA SERPL-MCNC: 3.4 NG/ML (ref 0–4)
RBC # BLD AUTO: 4.47 X10E6/UL (ref 4.14–5.8)
SODIUM SERPL-SCNC: 138 MMOL/L (ref 134–144)
TRIGL SERPL-MCNC: 92 MG/DL (ref 0–149)
TSH SERPL DL<=0.005 MIU/L-ACNC: 3 UIU/ML (ref 0.45–4.5)
VLDLC SERPL CALC-MCNC: 16 MG/DL (ref 5–40)
WBC # BLD AUTO: 4.4 X10E3/UL (ref 3.4–10.8)

## 2025-04-10 ENCOUNTER — TREATMENT (OUTPATIENT)
Dept: PHYSICAL THERAPY | Facility: CLINIC | Age: 70
End: 2025-04-10
Payer: MEDICARE

## 2025-04-10 DIAGNOSIS — M25.511 CHRONIC RIGHT SHOULDER PAIN: Primary | ICD-10-CM

## 2025-04-10 DIAGNOSIS — M79.18 MYALGIA, SHOULDER REGION: ICD-10-CM

## 2025-04-10 DIAGNOSIS — G89.29 CHRONIC RIGHT SHOULDER PAIN: Primary | ICD-10-CM

## 2025-04-10 DIAGNOSIS — G25.89 SCAPULAR DYSKINESIS: ICD-10-CM

## 2025-04-10 NOTE — PROGRESS NOTES
Re-Assessment / Re-Certification  [address] [place] [address] [address], [place]  [address]      Patient: Daniel Cagle   : 1955  Diagnosis/ICD-10 Code:  Chronic right shoulder pain [M25.511, G89.29]  Referring practitioner: Jerson Tovar PA-C  Date of Initial Visit: Type: THERAPY  Noted: 3/5/2025  Today's Date: 4/10/2025  Patient seen for 9 sessions      Subjective:   Daniel Cagle reports: his R shoulder was extremely sore for several days after needling last week. He does report relief of sxs overall compared to when he started PT. DESHAUN & Daniel slightly scaled back exercises on Tuesday and he is feeling pretty good today.   Subjective Questionnaire: PT Functional Test: Quick DASH = 27 = 36% impairment  Clinical Progress: improved  Home Program Compliance: Yes  Treatment has included: therapeutic exercise, manual therapy, therapeutic activity, electrical stimulation, dry needling, and moist heat    Subjective   Objective   Lower trap strength: 4/ R & L   Ulnar nerve tension: R negative  R  strength: 68#  R empty can: very mild R sup shldr pain    Assessment/Plan  Daniel exhibits improved lower trap strength, negative R ulnar nerve tension, slightly improved R  strength. He is also exhibiting improved upright posture and reports improved awareness of posture. Daniel cont to have R>L upper quadrant hypertonia and extreme superficial sensitivity to tactile stimuli at R mid T spine, likely correlating to nerve irritation. He will cont to benefit from skilled PT to address thoracic hypomobility & lower trap strength.     Progress toward previous goals: Partially Met    STG: to be achieved in 3 wks  1. Pt will demonstrate lower trap strength B improved to at least 4+/5 for steering motorcycle. - Progressing 4/5 on 4/10  2. Pt will demonstrate negative R ulnar nerve tension for working overhead. - MET     LTG: to be achieved in 12 wks  1. Pt will improve R  strength to at least 73# for managing motorcycle  kellee.- Progressing 68# on 4/10  2. Pt will improve QuickDash score from 39% impairment to no greater than 20% impairment to reflect improved activity tolerance and functional mobility. - Progressing 36% on 4/10  3. Pt will demonstrate negative R empty can test for using work tools.- Improved but NOT MET        Recommendations: Continue as planned  Timeframe: 3 months  Frequency: 2x/wk  Prognosis to achieve goals: good    PT Signature: Dee David PT, DPT, cert DN  Physical Therapist  [place] Lic # [id number]  Electronically signed by Dee David PT, 04/10/25, 7:27 AM EDT    Certification Period: 4/10/25 thru 7/8/25  I certify that the therapy services are furnished while this patient is under my care. The services outlined above are required by this patient and will be reviewed every 90 days.    PHYSICIAN: Jerson Tovar PA-C _____________________________________________________________  NPI: [id number]                                      DATE:    ___________________________________________      Timed:         Manual Therapy:    8     mins  54047;     Therapeutic Exercise:    15     mins  50331;     Neuromuscular Hermelindo:        mins  24435;    Therapeutic Activity:     15     mins  65107;     Gait Training:           mins  69734;     Ultrasound:          mins  35136;    Ionto                                   mins   60143  Self Care                            mins   85762  Tests & Measures             mins   48190    Un-Timed:  Electrical Stimulation:         mins  40671 ( );  Dry Needling          mins 38305/25362  Traction          mins 60607  Can Repos          mins 52674  Low Eval          Mins  54664  Mod Eval          Mins  76830  High Eval                            Mins  74252  Re-Eval                           15    mins  19564      Timed Treatment:   38   mins   Total Treatment:     53   mins

## 2025-04-15 ENCOUNTER — TREATMENT (OUTPATIENT)
Dept: PHYSICAL THERAPY | Facility: CLINIC | Age: 70
End: 2025-04-15
Payer: MEDICARE

## 2025-04-15 DIAGNOSIS — M79.18 MYALGIA, SHOULDER REGION: ICD-10-CM

## 2025-04-15 DIAGNOSIS — M25.511 CHRONIC RIGHT SHOULDER PAIN: Primary | ICD-10-CM

## 2025-04-15 DIAGNOSIS — G25.89 SCAPULAR DYSKINESIS: ICD-10-CM

## 2025-04-15 DIAGNOSIS — G89.29 CHRONIC RIGHT SHOULDER PAIN: Primary | ICD-10-CM

## 2025-04-15 NOTE — PROGRESS NOTES
Physical Therapy Daily Treatment Note  [address] [facility] [address], [address], [place]  [address]    Patient: Daniel Cagle   : 1955  Diagnosis/ICD-10 Code:  Chronic right shoulder pain [M25.511, G89.29]   Problems Addressed this Visit          Musculoskeletal and Injuries    Chronic right shoulder pain - Primary     Other Visit Diagnoses         Myalgia, shoulder region          Scapular dyskinesis              Diagnoses         Codes Comments      Chronic right shoulder pain    -  Primary ICD-10-CM: M25.511, G89.29  ICD-9-CM: 719.41, 338.29       Myalgia, shoulder region     ICD-10-CM: M79.18  ICD-9-CM: 729.1       Scapular dyskinesis     ICD-10-CM: G25.89  ICD-9-CM: 781.3           Referring practitioner: Jerson Tovar PA-C  Date of Initial Visit: Type: THERAPY  Noted: 3/5/2025  Today's Date: 4/15/2025    VISIT#: 10    Subjective   Daniel reports he was up all night w/severe R shoulder pain. He took Tylenol but it only helped about an hour and half. He admits frustration with severity of pain, though is having more good days than he had been. And yesterday he was doing a lot of pushing and turning using tools for work.     Objective     See Exercise, Manual, and Modality Logs for complete treatment.     Assessment/Plan  Pain is still severe, though less frequently. Flare ups seem to be explained w/work activities.   Progress per Plan of Care         Timed:         Manual Therapy:    8     mins  66442;     Therapeutic Exercise:    15     mins  60771;     Neuromuscular Hermelindo:        mins  63866;    Therapeutic Activity:     15     mins  96633;     Gait Training:           mins  25657;     Ultrasound:          mins  56773;    Ionto                                   mins   06300  Self Care                            mins   09443  Tests & Measures              mins   27214  [place] stim                    mins   09014    Un-Timed:  Canalith Repos                   mins  31883  Electrical Stimulation:    20     mins   23310 ( );  Dry Needling          mins 60604/59920  Traction          mins 04221  Low Eval          Mins  87089  Mod Eval          Mins  12638  High Eval                            Mins  71135  Re-Eval                               mins  20615    Timed Treatment:   38   mins   Total Treatment:     58   mins    Dee David, PT, DPT, cert. DN  Physical Therapist  [place] Lic # [id number]   1

## 2025-04-17 ENCOUNTER — TREATMENT (OUTPATIENT)
Dept: PHYSICAL THERAPY | Facility: CLINIC | Age: 70
End: 2025-04-17
Payer: MEDICARE

## 2025-04-17 DIAGNOSIS — M25.511 CHRONIC RIGHT SHOULDER PAIN: Primary | ICD-10-CM

## 2025-04-17 DIAGNOSIS — G89.29 CHRONIC RIGHT SHOULDER PAIN: Primary | ICD-10-CM

## 2025-04-17 DIAGNOSIS — M79.18 MYALGIA, SHOULDER REGION: ICD-10-CM

## 2025-04-17 DIAGNOSIS — G25.89 SCAPULAR DYSKINESIS: ICD-10-CM

## 2025-04-17 PROCEDURE — G0283 ELEC STIM OTHER THAN WOUND: HCPCS | Performed by: PHYSICAL THERAPIST

## 2025-04-17 PROCEDURE — 97110 THERAPEUTIC EXERCISES: CPT | Performed by: PHYSICAL THERAPIST

## 2025-04-17 PROCEDURE — 97112 NEUROMUSCULAR REEDUCATION: CPT | Performed by: PHYSICAL THERAPIST

## 2025-04-17 NOTE — PROGRESS NOTES
Physical Therapy Daily Treatment Note      Patient: Daniel Cagle   : 1955  Diagnosis/ICD-10 Code:  Chronic right shoulder pain [M25.511, G89.29]  Referring practitioner: Jerson Tovar PA-C  Date of Initial Visit: Type: THERAPY  Noted: 3/5/2025  Today's Date: 2025  Patient seen for 11 sessions         Daniel Cagle reports: he continues to have symptoms into R UE today less severe than last visit he states he may only give dry needling one more chance as its not having as much impact as he had expected.    Objective   See Exercise, Manual, and Modality Logs for complete treatment.     Assessment/Plan  Pt. Tolerates treatment well this visit and continues to benefit form light STM and retention of mobility through PROM. Pt. Has good scapular enagement today with exercises showing good understanding of intention of exercise.    Progress toward previous goals: Partially Met     STG: to be achieved in 3 wks  1. Pt will demonstrate lower trap strength B improved to at least 4+/5 for steering motorcycle. - Progressing 4/5 on 4/10  2. Pt will demonstrate negative R ulnar nerve tension for working overhead. - MET     LTG: to be achieved in 12 wks  1. Pt will improve R  strength to at least 73# for managing motorcycle throttle.- Progressing 68# on 4/10  2. Pt will improve QuickDash score from 39% impairment to no greater than 20% impairment to reflect improved activity tolerance and functional mobility. - Progressing 36% on 4/10  3. Pt will demonstrate negative R empty can test for using work tools.- Improved but NOT MET    Progress strengthening /stabilization /functional activity       Timed:         Manual Therapy:    10     mins  08945;     Therapeutic Exercise:    10     mins  70413;     Neuromuscular Hermelindo:    10    mins  20255;      Un-Timed:  Electrical Stimulation:    15     mins  56498 ( );      Timed Treatment:   30   mins   Total Treatment:     45   mins    Neda Obrien  DESHAUN  Physical Therapist Assistant License #[id number]

## 2025-04-22 ENCOUNTER — TREATMENT (OUTPATIENT)
Dept: PHYSICAL THERAPY | Facility: CLINIC | Age: 70
End: 2025-04-22
Payer: MEDICARE

## 2025-04-22 DIAGNOSIS — G25.89 SCAPULAR DYSKINESIS: ICD-10-CM

## 2025-04-22 DIAGNOSIS — M25.511 CHRONIC RIGHT SHOULDER PAIN: Primary | ICD-10-CM

## 2025-04-22 DIAGNOSIS — G89.29 CHRONIC RIGHT SHOULDER PAIN: Primary | ICD-10-CM

## 2025-04-22 DIAGNOSIS — M79.18 MYALGIA, SHOULDER REGION: ICD-10-CM

## 2025-04-22 PROCEDURE — 97112 NEUROMUSCULAR REEDUCATION: CPT | Performed by: PHYSICAL THERAPIST

## 2025-04-22 PROCEDURE — G0283 ELEC STIM OTHER THAN WOUND: HCPCS | Performed by: PHYSICAL THERAPIST

## 2025-04-22 PROCEDURE — 97110 THERAPEUTIC EXERCISES: CPT | Performed by: PHYSICAL THERAPIST

## 2025-04-22 NOTE — PROGRESS NOTES
Physical Therapy Daily Treatment Note      Patient: Daniel Cagle   : 1955  Diagnosis/ICD-10 Code:  Chronic right shoulder pain [M25.511, G89.29]  Referring practitioner: Jerson Tovar PA-C  Date of Initial Visit: Type: THERAPY  Noted: 3/5/2025  Today's Date: 2025  Patient seen for 12 sessions         Daniel Cagle reports: he took some pain medication last night to help with the pain from a light burned he got while working. Pt. States the shoulder pain was suprisingly in the AC joint today and very irritated when going into abduction. Pt. Also reports he will be going on a long motorcycle ride across country over 2 weeks coming up.    Objective   See Exercise, Manual, and Modality Logs for complete treatment.     Assessment/Plan  Pt. Has continued varied response to therapy and continued varied degrees of numbness or symptoms. Pt. was educated on K-tape and may benefit from trying to it when going on his prolonged trip to provide some stabilizing cues. Pt. May be limited in taping options due to skin allergies and irritations to adhesive.    STG: to be achieved in 3 wks  1. Pt will demonstrate lower trap strength B improved to at least 4+/5 for steering motorcycle. - Progressing 4/5 on 4/10  2. Pt will demonstrate negative R ulnar nerve tension for working overhead. - MET     LTG: to be achieved in 12 wks  1. Pt will improve R  strength to at least 73# for managing motorcycle throttle.- Progressing 68# on 4/10  2. Pt will improve QuickDash score from 39% impairment to no greater than 20% impairment to reflect improved activity tolerance and functional mobility. - Progressing 36% on 4/10  3. Pt will demonstrate negative R empty can test for using work tools.- Improved but NOT MET    Progress strengthening /stabilization /functional activity       Timed:         Manual Therapy:    10     mins  89805;     Therapeutic Exercise:    10     mins  85717;     Neuromuscular Hermelindo:    10    mins   85634;      Un-Timed:  Electrical Stimulation:   15      mins  48397 ( );      Timed Treatment:   30   mins   Total Treatment:     45   mins    Neda Obrien PTA  Physical Therapist Assistant License #[id number]

## 2025-04-24 ENCOUNTER — TREATMENT (OUTPATIENT)
Dept: PHYSICAL THERAPY | Facility: CLINIC | Age: 70
End: 2025-04-24
Payer: MEDICARE

## 2025-04-24 DIAGNOSIS — G89.29 CHRONIC RIGHT SHOULDER PAIN: Primary | ICD-10-CM

## 2025-04-24 DIAGNOSIS — G25.89 SCAPULAR DYSKINESIS: ICD-10-CM

## 2025-04-24 DIAGNOSIS — M25.511 CHRONIC RIGHT SHOULDER PAIN: Primary | ICD-10-CM

## 2025-04-24 DIAGNOSIS — M79.18 MYALGIA, SHOULDER REGION: ICD-10-CM

## 2025-04-24 NOTE — PROGRESS NOTES
Physical Therapy Daily Treatment Note  [address] [facility] [address], [place]  [address]    Patient: Daniel Cagle   : 1955  Diagnosis/ICD-10 Code:  Chronic right shoulder pain [M25.511, G89.29]   Problems Addressed this Visit          Musculoskeletal and Injuries    Chronic right shoulder pain - Primary     Other Visit Diagnoses         Myalgia, shoulder region          Scapular dyskinesis              Diagnoses         Codes Comments      Chronic right shoulder pain    -  Primary ICD-10-CM: M25.511, G89.29  ICD-9-CM: 719.41, 338.29       Myalgia, shoulder region     ICD-10-CM: M79.18  ICD-9-CM: 729.1       Scapular dyskinesis     ICD-10-CM: G25.89  ICD-9-CM: 781.3           Referring practitioner: Jerson Tovar PA-C  Date of Initial Visit: Type: THERAPY  Noted: 3/5/2025  Today's Date: 2025    VISIT#: 13    Subjective   Daniel reports he really hasn't noticed a significant improvement in RUE symptoms since starting therapy. He still has tingling in R arm down to hand. He'd like to do EDN again todday    Objective     See Exercise, Manual, and Modality Logs for complete treatment.     PT washed hands then donned gloves and cleaned patient skin with alcohol swab prior to administration. Single use needles were used throughout. Pt gives verbal consent. Pt was left with bell in reach if needed.       UT & levator: Pt in prone R only    1 0.13t30kj needle placed ~1 thumb breadth sup to clavicle line into UT muscle belly inserted 25 mm, performed fanning, angled needle cephelad     1 0.19j29eq needle placed ~1 thumb breadth sup to clavicle line into proximal levator muscle belly inserted 25 mm, performed fanning, angled needle cephelad     0.67h67vx needle placed just superior to scapular spine along medial border into levator muscle belly inserted 25 mm, superolateral angle, performed     0.12f31pj needled w/periosteal pecking to superior angle of L scapula, levator insertion bilaterally     Cervical  pain: bilat    0.46a51gc needle inserted 20mm in B obliquus capitus inferior muscle belly left in situ x 20 min     0.88s25to needle inserted 20mm in B obliquus capitus superior muscle belly left in situ x20 min     0.83b09ku needle inserted 20 mm to R cervical multifidi at C3, 5 left in situ x 20 min     0.30w86um needle inserted 20mm to L cervical multifidi at C2, 4, 7 left in situ x 20     0.25x 40mm needle inserted 30mm to L thoracic multifidi at T2-5 d/t inc'd muscle tone and tenderness to palpation left in situ x 20 min     Applied electrical stimulation: 250 microseconds, 2 HZ x 20 min to affected area.  PT present throughout. Verbal consent obtained.     Assessment/Plan  Pt tolerated needling very well overall today. No bleeding noted upon removal of needles.   Progress per Plan of Care         Timed:         Manual Therapy:         mins  56946;     Therapeutic Exercise:    8     mins  97723;     Neuromuscular Hermelindo:        mins  04023;    Therapeutic Activity:          mins  48865;     Gait Training:           mins  50458;     Ultrasound:          mins  62990;    Ionto                                   mins   73098  Self Care                            mins   46856  Tests & Measures              mins   61393  [place]ish stim                    mins   09641    Un-Timed:  Canalith Repos                   mins  18287  Electrical Stimulation:    20     mins  68765 ( );  Dry Needling     20     mins 94860  Traction          mins 07648  Low Eval          Mins  20694  Mod Eval          Mins  21353  High Eval                            Mins  21716  Re-Eval                               mins  87746    Timed Treatment:   8   mins   Total Treatment:     48   mins    Dee David, PT, DPT, cert. DN  Physical Therapist  [place] Lic # [id number]

## 2025-04-28 ENCOUNTER — TREATMENT (OUTPATIENT)
Dept: PHYSICAL THERAPY | Facility: CLINIC | Age: 70
End: 2025-04-28
Payer: MEDICARE

## 2025-04-28 DIAGNOSIS — G89.29 CHRONIC RIGHT SHOULDER PAIN: Primary | ICD-10-CM

## 2025-04-28 DIAGNOSIS — M25.511 CHRONIC RIGHT SHOULDER PAIN: Primary | ICD-10-CM

## 2025-04-28 DIAGNOSIS — M79.18 MYALGIA, SHOULDER REGION: ICD-10-CM

## 2025-04-28 DIAGNOSIS — G25.89 SCAPULAR DYSKINESIS: ICD-10-CM

## 2025-04-28 PROCEDURE — 97110 THERAPEUTIC EXERCISES: CPT | Performed by: PHYSICAL THERAPIST

## 2025-04-28 PROCEDURE — G0283 ELEC STIM OTHER THAN WOUND: HCPCS | Performed by: PHYSICAL THERAPIST

## 2025-04-28 PROCEDURE — 97530 THERAPEUTIC ACTIVITIES: CPT | Performed by: PHYSICAL THERAPIST

## 2025-04-28 NOTE — PROGRESS NOTES
Physical Therapy Daily Treatment Note  [address] [facility] [address], [address], [place]  [address]    Patient: Daniel Cagle   : 1955  Diagnosis/ICD-10 Code:  Chronic right shoulder pain [M25.511, G89.29]   Problems Addressed this Visit          Musculoskeletal and Injuries    Chronic right shoulder pain - Primary     Other Visit Diagnoses         Myalgia, shoulder region          Scapular dyskinesis              Diagnoses         Codes Comments      Chronic right shoulder pain    -  Primary ICD-10-CM: M25.511, G89.29  ICD-9-CM: 719.41, 338.29       Myalgia, shoulder region     ICD-10-CM: M79.18  ICD-9-CM: 729.1       Scapular dyskinesis     ICD-10-CM: G25.89  ICD-9-CM: 781.3           Referring practitioner: Jerson Tovar PA-C  Date of Initial Visit: Type: THERAPY  Noted: 3/5/2025  Today's Date: 2025    VISIT#: 14    Subjective   Daniel reports he noticed less tension along top of R shoulder after EDN last week. He'd like to maybe do it again before his trip but not today.     Objective     See Exercise, Manual, and Modality Logs for complete treatment.     Assessment/Plan  Intro'd standing pec stretch in doorway. May EDN next Thursday depending on primary PT schedule.   Progress per Plan of Care         Timed:         Manual Therapy:         mins  64044;     Therapeutic Exercise:    15     mins  74480;     Neuromuscular Hermelindo:        mins  73178;    Therapeutic Activity:     10     mins  95595;     Gait Training:           mins  13638;     Ultrasound:          mins  12314;    Ionto                                   mins   97142  Self Care                            mins   74884  Tests & Measures              mins   54801  [place] stim                    mins   74666    Un-Timed:  Canalith Repos                   mins  16048  Electrical Stimulation:    20     mins  65522 ( );  Dry Needling          mins 69655/77760  Traction          mins 53878  Low Eval          Mins  35918  Mod Eval          Mins   61738  High Eval                            Mins  13960  Re-Eval                               mins  55150    Timed Treatment:   25   mins   Total Treatment:     45   mins    Dee David, PT, DPT, cert. DN  Physical Therapist  [place] Lic # [id number]

## 2025-05-01 ENCOUNTER — TREATMENT (OUTPATIENT)
Dept: PHYSICAL THERAPY | Facility: CLINIC | Age: 70
End: 2025-05-01
Payer: MEDICARE

## 2025-05-01 DIAGNOSIS — G89.29 CHRONIC RIGHT SHOULDER PAIN: Primary | ICD-10-CM

## 2025-05-01 DIAGNOSIS — G25.89 SCAPULAR DYSKINESIS: ICD-10-CM

## 2025-05-01 DIAGNOSIS — M25.511 CHRONIC RIGHT SHOULDER PAIN: Primary | ICD-10-CM

## 2025-05-01 DIAGNOSIS — M79.18 MYALGIA, SHOULDER REGION: ICD-10-CM

## 2025-05-01 PROCEDURE — 97110 THERAPEUTIC EXERCISES: CPT | Performed by: PHYSICAL THERAPIST

## 2025-05-01 PROCEDURE — 97140 MANUAL THERAPY 1/> REGIONS: CPT | Performed by: PHYSICAL THERAPIST

## 2025-05-01 NOTE — PROGRESS NOTES
Physical Therapy Daily Treatment Note  [address], [address], [place]  [address]    Patient: Daniel Cagle   : 1955  Diagnosis/ICD-10 Code:  Chronic right shoulder pain [M25.511, G89.29]   Problems Addressed this Visit          Musculoskeletal and Injuries    Chronic right shoulder pain - Primary     Other Visit Diagnoses         Myalgia, shoulder region          Scapular dyskinesis              Diagnoses         Codes Comments      Chronic right shoulder pain    -  Primary ICD-10-CM: M25.511, G89.29  ICD-9-CM: 719.41, 338.29       Myalgia, shoulder region     ICD-10-CM: M79.18  ICD-9-CM: 729.1       Scapular dyskinesis     ICD-10-CM: G25.89  ICD-9-CM: 781.3           Referring practitioner: Jerson Tovar PA-C  Date of Initial Visit: Type: THERAPY  Noted: 3/5/2025  Today's Date: 2025    VISIT#: 16    Subjective   Daniel reports he had relief of R shoulder blade area tension since last time we did EDN. He is trying to avoid excessive heavy lifting & reaching. [place] [employer] [place].     Objective     See Exercise, Manual, and Modality Logs for complete treatment.     PT washed hands then donned gloves and cleaned patient skin with alcohol swab prior to administration. Single use needles were used throughout. Pt gives verbal consent. Pt was left with bell in reach if needed.       UT & levator: Pt in prone R only    1 0.21d42ct needle placed ~1 thumb breadth sup to clavicle line into UT muscle belly inserted 25 mm, performed fanning, angled needle cephelad     1 0.27n64tn needle placed ~1 thumb breadth sup to clavicle line into proximal levator muscle belly inserted 25 mm, performed fanning, angled needle cephelad     0.47p50qp needle placed just superior to scapular spine along medial border into levator muscle belly inserted 25 mm, superolateral angle, performed     0.30p15lx needled w/periosteal pecking to superior angle of L scapula, levator insertion  bilaterally     Cervical pain: bilat    0.16t65na needle inserted 20mm in B obliquus capitus inferior muscle belly left in situ x 20 min     0.45y36uk needle inserted 20mm in B obliquus capitus superior muscle belly left in situ x20 min     0.74u68hz needle inserted 20 mm to R cervical multifidi at C3, 5 left in situ x 20 min     0.58u63xr needle inserted 20mm to L cervical multifidi at C2, 4, 7 left in situ x 20     0.25x 40mm needle inserted 30mm to L thoracic multifidi at T2-5 d/t inc'd muscle tone and tenderness to palpation left in situ x 20 min         Applied electrical stimulation: 250 microseconds, 2 HZ x 20 min to affected area.  PT present throughout. Verbal consent obtained.     Assessment/Plan  Daniel notes reduced tension in upper back and shoulder muscles for the past week. He also noted 3 nights this week without waking d/t shoulder pain.   Progress per Plan of Care         Timed:         Manual Therapy:         mins  14163;     Therapeutic Exercise:         mins  93247;     Neuromuscular Hermelindo:        mins  02162;    Therapeutic Activity:          mins  88435;     Gait Training:           mins  23932;     Ultrasound:          mins  58038;    Ionto                                   mins   43136  Self Care                            mins   13881  Tests & Measures              mins   37276  American stim                    mins   98015    Un-Timed:  Canalith Repos                   mins  75248  Electrical Stimulation:    20     mins  68130 ( );  Dry Needling     20     mins 45318  Traction          mins 45852  Low Eval          Mins  18667  Mod Eval          Mins  86116  High Eval                            Mins  33576  Re-Eval                               mins  95339    Timed Treatment:   0   mins   Total Treatment:     40   mins    Dee David, PT, DPT, cert. DN  Physical Therapist  [place] Lic # [id number]

## 2025-05-01 NOTE — PROGRESS NOTES
Physical Therapy Daily Treatment Note      Patient: Daniel Cagle   : 1955  Diagnosis/ICD-10 Code:  Chronic right shoulder pain [M25.511, G89.29]  Referring practitioner: Jerson Tovar PA-C  Date of Initial Visit: Type: THERAPY  Noted: 3/5/2025  Today's Date: 2025  Patient seen for 15 sessions         Daniel Cagle reports: he Is still having some pain at this time and he does see some improvement but not as much since as he had hoped before his motorcycle trip.    Objective   See Exercise, Manual, and Modality Logs for complete treatment.     Assessment/Plan  Pt. Responds very well to stretch and theragun massage this date following exercises. Pt. is showing improved scap engagement overall and doesn't require as many cues to do so.    STG: to be achieved in 3 wks  1. Pt will demonstrate lower trap strength B improved to at least 4+/5 for steering motorcycle. - Progressing 4/5 on 4/10  2. Pt will demonstrate negative R ulnar nerve tension for working overhead. - MET     LTG: to be achieved in 12 wks  1. Pt will improve R  strength to at least 73# for managing motorcycle throttle.- Progressing 68# on 4/10  2. Pt will improve QuickDash score from 39% impairment to no greater than 20% impairment to reflect improved activity tolerance and functional mobility. - Progressing 36% on 4/10  3. Pt will demonstrate negative R empty can test for using work tools.- Improved but NOT MET    Progress strengthening /stabilization /functional activity           Timed:         Manual Therapy:    15     mins  57055;     Therapeutic Exercise:    15     mins  34742;       Timed Treatment:   30   mins   Total Treatment:     30   mins    Neda Obrien PTA  Physical Therapist Assistant License #[id number]

## 2025-05-01 NOTE — PROGRESS NOTES
Office Note     Name: Daniel Cagle    : 1955     MRN: [id number]     Chief Complaint  Shoulder Pain and Hypertension    Subjective     History of Present Illness:  Daniel Cagle is a 68 y.o. male who presents today for follow up on shoulder  pain and hypertension. Patient reports his shoulder pain has improved over the past few months and feels better as he has been gaining strength.         History of Present Illness  Patient reports he does not routinely check his blood pressure at home.  Hypertension  This is a chronic problem. The current episode started more than 1 year ago. The problem has been gradually improving since onset. The problem is controlled. Pertinent negatives include no chest pain, palpitations or shortness of breath.     Allergies   Allergen Reactions    Cyclobenzaprine Unknown - High Severity     Jerking movements.     Prednisone Other (See Comments)     Made hyper and burning          Current Outpatient Medications:     buPROPion XL (WELLBUTRIN XL) 150 MG 24 hr tablet, Take 1 tablet by mouth Daily., Disp: 90 tablet, Rfl: 1    citalopram (CeleXA) 40 MG tablet, Take 0.5 tablets by mouth 2 (Two) Times a Day. Taking one tablet daily. Breaking it in half to take half in the AM and half in the evening., Disp: 90 tablet, Rfl: 1    hydroCHLOROthiazide (HYDRODIURIL) 25 MG tablet, Take 1 tablet by mouth Daily., Disp: 90 tablet, Rfl: 3    irbesartan (AVAPRO) 150 MG tablet, Take 1 tablet by mouth Every Night., Disp: 90 tablet, Rfl: 3    Loratadine 10 MG capsule, Take  by mouth., Disp: , Rfl:     simvastatin (ZOCOR) 40 MG tablet, Take 1 tablet by mouth Daily., Disp: 90 tablet, Rfl: 2    diclofenac (VOLTAREN) 50 MG EC tablet, Take 1 tablet by mouth 2 (Two) Times a Day., Disp: 60 tablet, Rfl: 12    sildenafil (VIAGRA) 50 MG tablet, Take 1 tablet by mouth Daily As Needed for Erectile Dysfunction. Take 30 minutes to 4 hours prior to sexual activity., Disp: 6 tablet, Rfl: 12    Past Medical  No History:   Diagnosis Date    Adult situational stress disorder     Allergic rhinitis     Anemia     Ankle sprain childhood    Arthritis of back 1984    Lower Spine Degen    Congenital hiatus hernia     Diverticulosis     Dysphagia     ED (erectile dysfunction)     Hyperlipidemia     Hypertension     Low back strain     pulled lifting    Lumbosacral disc disease     ruptured disc L5-S1    Olecranon bursitis     Rotator cuff syndrome     pulled throwing softball    Tennis elbow 2002 ????    Golfers Elbow from hammering       Review of Systems   Constitutional:  Negative for chills, fatigue and fever.   Respiratory:  Negative for cough, shortness of breath and wheezing.    Cardiovascular:  Negative for chest pain, palpitations and leg swelling.   Gastrointestinal:         Occasional right upper quadrant abdominal discomfort.    Genitourinary:  Positive for erectile dysfunction.   Musculoskeletal:  Positive for arthralgias and myalgias.     Social History     Socioeconomic History    Marital status:    Tobacco Use    Smoking status: Former     Packs/day: 1.00     Years: 5.00     Pack years: 5.00     Types: Cigarettes     Start date: 1971     Quit date: 1976     Years since quittin.7    Smokeless tobacco: Never   Vaping Use    Vaping Use: Never used   Substance and Sexual Activity    Alcohol use: No    Drug use: No    Sexual activity: Not Currently     Partners: Female     Birth control/protection: Condom, Natural family planning/Rhythm       Family History   Problem Relation Age of Onset    Stroke Father     Dislocations Mother         Broke Hip / Replaced    Cancer Sister         Pancriatic Cancer    Cancer Brother         Lung Cancer           3/20/2023     8:37 AM   PHQ-2/PHQ-9 Depression Screening   Little Interest or Pleasure in Doing Things 0-->not at all   Feeling Down, Depressed or Hopeless 0-->not at all   PHQ-9: Brief Depression Severity Measure Score 0       Fall Risk  "Screen:  RACHELLE Fall Risk Assessment was completed, and patient is at LOW risk for falls.Assessment completed on:3/20/2023      Objective     /74 (BP Location: Right arm, Patient Position: Sitting, Cuff Size: Adult)   Pulse 70   Ht 177.8 cm (70\")   Wt 86.6 kg (191 lb)   SpO2 98%   BMI 27.41 kg/m²     BP Readings from Last 2 Encounters:   09/19/23 130/74   05/02/23 143/82       Wt Readings from Last 2 Encounters:   09/19/23 86.6 kg (191 lb)   06/06/23 88.5 kg (195 lb)       BMI is >= 25 and <30. (Overweight) The following options were offered after discussion;: exercise counseling/recommendations and nutrition counseling/recommendations         Physical Exam  Vitals and nursing note reviewed.   Constitutional:       General: He is not in acute distress.     Appearance: Normal appearance. He is well-groomed and normal weight.   Cardiovascular:      Rate and Rhythm: Normal rate and regular rhythm.      Pulses: Normal pulses.      Heart sounds: Normal heart sounds.   Pulmonary:      Effort: Pulmonary effort is normal.      Breath sounds: Normal breath sounds.   Abdominal:      Palpations: Abdomen is not rigid.   Musculoskeletal:         General: Tenderness (right neck/shoulder strain) present.   Skin:     General: Skin is warm and dry.      Capillary Refill: Capillary refill takes less than 2 seconds.   Neurological:      Mental Status: He is oriented to person, place, and time. Mental status is at baseline.   Psychiatric:         Mood and Affect: Mood normal.         Behavior: Behavior normal. Behavior is cooperative.         Thought Content: Thought content normal.     Derm Physical Exam  Result Review :     The following data was reviewed by: THU Mccarthy on 09/19/2023:  CMP          10/11/2022    09:23 3/20/2023    09:49   CMP   Glucose 101  100    BUN 15  9    Creatinine 0.77  0.78    Sodium 134  137    Potassium 4.9  4.4    Chloride 92  97    Calcium 9.3  9.2    Total Protein 6.7  6.7  "   Albumin 4.7  4.5    Globulin 2.0  2.2    Total Bilirubin 0.2  <0.2    Alkaline Phosphatase 134  125    AST (SGOT) 20  33    ALT (SGPT) 19  31    BUN/Creatinine Ratio 19  12      CBC w/diff          10/11/2022    09:23 3/20/2023    09:49   CBC w/Diff   WBC 4.0  2.9    RBC 5.03  4.99    Hemoglobin 14.2  13.6    Hematocrit 44.6  41.5    MCV 89  83    MCH 28.2  27.3    MCHC 31.8  32.8    RDW 14.3  14.1    Platelets 435  404    Neutrophil Rel % 72  63    Lymphocyte Rel % 7  16    Monocyte Rel % 15  14    Eosinophil Rel % 4  6    Basophil Rel % 2  1      Lipid Panel          10/11/2022    09:23 3/20/2023    09:49   Lipid Panel   Total Cholesterol 200  165    Triglycerides 92  90    HDL Cholesterol 73  46    VLDL Cholesterol 16  17    LDL Cholesterol  111  102      UA          9/27/2022    11:38   Urinalysis   Ketones, UA Negative    Leukocytes, UA Negative           Data reviewed : physical therapy review      Assessment and Plan     Procedures  Plan    Diagnoses and all orders for this visit:    1. Encounter for hepatitis C screening test for low risk patient (Primary)  -     Hepatitis C Antibody    2. Hypertension, benign  -     POC Urinalysis Dipstick, Multipro  -     CBC & Differential    3. Mixed hyperlipidemia  -     Lipid Panel  -     simvastatin (ZOCOR) 40 MG tablet; Take 1 tablet by mouth Daily.  Dispense: 90 tablet; Refill: 2    4. Cervical radiculopathy  Comments:  Encouraged pillow change, neck massage/pt eval if needed. topical voltaren, po diclofenac prn    5. Chronic right shoulder pain  Comments:  Voltaren gel sample applied  Orders:  -     Comprehensive Metabolic Panel  -     diclofenac (VOLTAREN) 50 MG EC tablet; Take 1 tablet by mouth 2 (Two) Times a Day.  Dispense: 60 tablet; Refill: 12    6. Elevated alkaline phosphatase level  -     PTH, Intact & Calcium  -     Peripheral Blood Smear  -     Magnesium  -     Phosphorus    7. Screening PSA (prostate specific antigen)  -     PSA Screen    8. Erectile  dysfunction, unspecified erectile dysfunction type  -     Testosterone (Free & Total), LC / MS  -     sildenafil (VIAGRA) 50 MG tablet; Take 1 tablet by mouth Daily As Needed for Erectile Dysfunction. Take 30 minutes to 4 hours prior to sexual activity.  Dispense: 6 tablet; Refill: 12    9. Adjustment disorder, unspecified type  -     buPROPion XL (WELLBUTRIN XL) 150 MG 24 hr tablet; Take 1 tablet by mouth Daily.  Dispense: 90 tablet; Refill: 1  -     citalopram (CeleXA) 40 MG tablet; Take 0.5 tablets by mouth 2 (Two) Times a Day. Taking one tablet daily. Breaking it in half to take half in the AM and half in the evening.  Dispense: 90 tablet; Refill: 1        Problem List Items Addressed This Visit          Active Problems    Adjustment disorder    Relevant Medications    buPROPion XL (WELLBUTRIN XL) 150 MG 24 hr tablet    citalopram (CeleXA) 40 MG tablet    Erectile dysfunction    Relevant Medications    sildenafil (VIAGRA) 50 MG tablet    Other Relevant Orders    Testosterone (Free & Total), LC / MS    Hyperlipidemia    Relevant Medications    simvastatin (ZOCOR) 40 MG tablet    Other Relevant Orders    Lipid Panel    Hypertension, benign    Relevant Orders    POC Urinalysis Dipstick, Multipro    CBC & Differential    Elevated alkaline phosphatase level    Relevant Orders    PTH, Intact & Calcium    Peripheral Blood Smear    Magnesium    Phosphorus     Other Visit Diagnoses       Encounter for hepatitis C screening test for low risk patient    -  Primary    Relevant Orders    Hepatitis C Antibody    Cervical radiculopathy        Encouraged pillow change, neck massage/pt eval if needed. topical voltaren, po diclofenac prn    Chronic right shoulder pain        Voltaren gel sample applied    Relevant Medications    diclofenac (VOLTAREN) 50 MG EC tablet    Other Relevant Orders    Comprehensive Metabolic Panel    Screening PSA (prostate specific antigen)        Relevant Orders    PSA Screen             Follow Up    Wrapup Tab  Return for Medicare Wellness.   Patient Instructions   My pillow.     Continue current plan of care as discussed.   Take medication as ordered (if applicable).    Practice good sleep hygiene.  Eat a well balanced diet with fresh fruit and vegetables.    Stay hydrated, drink water daily.      Limit sodium: salt, seasoned salt, garlic salt, onion salt, celery salt, etc.   Limit sweetened beverages, sodas, juices.    Bake, boil, broil or grill your food, avoid eating fried foods.   Regular exercise is important.  Incorporate weight or resistance into your routine.     Patient was given instructions and counseling regarding his condition or for health maintenance advice. Please see specific information pulled into the AVS if appropriate.  Hand hygiene was performed during entrance to exam room and following assessment of patient. This document is intended for medical expert use only.     EMR Dragon/Transcription disclaimer:   Much of this encounter note is an electronic transcription/translation of spoken language to printed text. The electronic translation of spoken language may permit erroneous, or at times, nonsensical words or phrases to be inadvertently transcribed.      THU Mccarthy, FNP-C  [address]  [facility]  [address] [place] [address] [address]  [place] [address] [phone]

## (undated) DEVICE — ENDOPATH XCEL WITH OPTIVIEW TECHNOLOGY BLADELESS TROCARS WITH STABILITY SLEEVES: Brand: ENDOPATH XCEL OPTIVIEW

## (undated) DEVICE — INSUFFLATION TUBING SET, ENDOFLATOR 50: Brand: N.A.

## (undated) DEVICE — ANTIBACTERIAL UNDYED BRAIDED (POLYGLACTIN 910), SYNTHETIC ABSORBABLE SUTURE: Brand: COATED VICRYL

## (undated) DEVICE — PASS SUT PRO BARIATRIC XL W/TROC SWABS

## (undated) DEVICE — BITEBLOCK ENDO W/STRAP 60F A/ LF DISP

## (undated) DEVICE — CVR HNDL LT SURG ACCSSRY BLU STRL

## (undated) DEVICE — PK ENDO GI 50

## (undated) DEVICE — PDS II VLT 0 107CM AG ST3: Brand: ENDOLOOP

## (undated) DEVICE — ENDOPATH XCEL WITH OPTIVIEW TECHNOLOGY UNIVERSAL TROCAR STABILITY SLEEVES: Brand: ENDOPATH XCEL OPTIVIEW

## (undated) DEVICE — NDL HYPO PRECISIONGLIDE/REG 18G 11/2 PNK

## (undated) DEVICE — ENDOPATH 5MM CURVED SCISSORS WITH MONOPOLAR CAUTERY: Brand: ENDOPATH

## (undated) DEVICE — INTENDED FOR TISSUE SEPARATION, AND OTHER PROCEDURES THAT REQUIRE A SHARP SURGICAL BLADE TO PUNCTURE OR CUT.: Brand: BARD-PARKER ® CARBON RIB-BACK BLADES

## (undated) DEVICE — UNDRGLV SURG BIOGEL PIMICROINDICATOR SYNTH SZ7.5PF STRL PR/2

## (undated) DEVICE — SUT ETHIB EXCL 0/0 36IN ETX524H

## (undated) DEVICE — ADHS SKIN PREMIERPRO EXOFIN TOPICAL HI/VISC .5ML

## (undated) DEVICE — GENERAL LAPAROSCOPY CDS: Brand: MEDLINE INDUSTRIES, INC.

## (undated) DEVICE — SOLUTION,WATER,IRRIGATION,1000ML,STERILE: Brand: MEDLINE

## (undated) DEVICE — SUT VIC 0 UR6 27IN VCP603H

## (undated) DEVICE — SLV SCD CALF HEMOFORCE DVT THERP REPROC MD

## (undated) DEVICE — 40580 - THE PINK PAD - ADVANCED TRENDELENBURG POSITIONING KIT: Brand: 40580 - THE PINK PAD - ADVANCED TRENDELENBURG POSITIONING KIT

## (undated) DEVICE — 2, DISPOSABLE SUCTION/IRRIGATOR WITH DISPOSABLE TIP: Brand: STRYKEFLOW

## (undated) DEVICE — KT SURG TURNOVER 050

## (undated) DEVICE — HARMONIC 1100 SHEARS, 20CM SHAFT LENGTH: Brand: HARMONIC

## (undated) DEVICE — ENDOPATH XCEL BLADELESS TROCARS WITH STABILITY SLEEVES: Brand: ENDOPATH XCEL

## (undated) DEVICE — 1/2 IN. X 18 IN. LENGTH: Brand: SILICONE TUBING, PENROSE DRAIN

## (undated) DEVICE — HARMONIC ACE 5MM DIAMETER SHEARS 36CM SHAFT LENGTH + ADAPTIVE TISSUE TECHNOLOGY FOR USE WITH GENERATOR G11: Brand: HARMONIC ACE

## (undated) DEVICE — SYR LUERLOK 30CC

## (undated) DEVICE — DRN PENRS 1/2X36IN LF

## (undated) DEVICE — GLV SURG BIOGEL LTX PF 7 1/2